# Patient Record
Sex: FEMALE | Race: BLACK OR AFRICAN AMERICAN | NOT HISPANIC OR LATINO | Employment: FULL TIME | URBAN - METROPOLITAN AREA
[De-identification: names, ages, dates, MRNs, and addresses within clinical notes are randomized per-mention and may not be internally consistent; named-entity substitution may affect disease eponyms.]

---

## 2020-03-04 ENCOUNTER — HOSPITAL ENCOUNTER (EMERGENCY)
Facility: HOSPITAL | Age: 25
Discharge: HOME | End: 2020-03-04
Attending: EMERGENCY MEDICINE
Payer: COMMERCIAL

## 2020-03-04 VITALS
TEMPERATURE: 99 F | DIASTOLIC BLOOD PRESSURE: 85 MMHG | RESPIRATION RATE: 18 BRPM | OXYGEN SATURATION: 98 % | WEIGHT: 183 LBS | SYSTOLIC BLOOD PRESSURE: 128 MMHG | HEIGHT: 65 IN | HEART RATE: 76 BPM | BODY MASS INDEX: 30.49 KG/M2

## 2020-03-04 DIAGNOSIS — M79.89 SWELLING OF ARM: Primary | ICD-10-CM

## 2020-03-04 DIAGNOSIS — M79.601 ARM PAIN, ANTERIOR, RIGHT: ICD-10-CM

## 2020-03-04 LAB
B-HCG UR QL: NEGATIVE
CTP QC/QA: YES

## 2020-03-04 PROCEDURE — 81025 URINE PREGNANCY TEST: CPT | Performed by: EMERGENCY MEDICINE

## 2020-03-04 PROCEDURE — 99284 EMERGENCY DEPT VISIT MOD MDM: CPT | Mod: 25

## 2020-03-04 RX ORDER — SULFAMETHOXAZOLE AND TRIMETHOPRIM 800; 160 MG/1; MG/1
1 TABLET ORAL 2 TIMES DAILY
Qty: 6 TABLET | Refills: 0 | Status: SHIPPED | OUTPATIENT
Start: 2020-03-04 | End: 2020-03-07

## 2020-03-04 RX ORDER — KETOROLAC TROMETHAMINE 10 MG/1
10 TABLET, FILM COATED ORAL EVERY 6 HOURS PRN
Qty: 10 TABLET | Refills: 0 | Status: SHIPPED | OUTPATIENT
Start: 2020-03-04 | End: 2020-03-07

## 2020-03-05 NOTE — ED NOTES
Patient identifiers for Trinity Peres checked and correct.  LOC: The patient is awake, alert and aware of environment with an appropriate affect, the patient is oriented x 3 and speaking appropriately.  APPEARANCE: Patient resting comfortably and in no acute distress, patient is clean and well groomed, patient's clothing are properly fastened.  SKIN: The skin is warm and dry, patient has normal skin turgor and moist mucus membranes. Slight swelling to right forearm.  MUSKULOSKELETAL: Patient moving all extremities well. Slight swelling to right forearm.  + radial pulse.  RESPIRATORY: Airway is open and patent, respirations are spontaneous, patient has a normal effort and rate.

## 2020-03-05 NOTE — ED PROVIDER NOTES
Encounter Date: 3/4/2020    SCRIBE #1 NOTE: I, Jorge Luis Jacobs, am scribing for, and in the presence of,  Dr. Hughes. I have scribed the entire note.       History     Chief Complaint   Patient presents with    Arm Pain     Complaining of right arm pain since last Monday. States she gave plasma and lower arm has been sore since then.       This is a 24 y.o. female who has no past medical history on file presents with chief complaint of right arm pain after giving plasma 9 days ago. She reports having some discomfort while giving plasma last week, and noted some swelling and pain to the proximal forearm that day. She reports the pain has spread distally down her forearm, and she now has pain with extension of her right elbow. Patient denies any fall, trauma,  worsening swelling, change in sensation. Furthermore, she denies any CP/SOB/N/V/fever/chills/abd pain/numbness/tingling/FB sensation.      The history is provided by the patient.     Review of patient's allergies indicates:  No Known Allergies  No past medical history on file.  No past surgical history on file.  No family history on file.  Social History     Tobacco Use    Smoking status: Not on file   Substance Use Topics    Alcohol use: Not on file    Drug use: Not on file     Review of Systems   Constitutional: Negative for chills and fever.   HENT: Negative for ear pain and sore throat.    Eyes: Negative for redness.   Respiratory: Negative for shortness of breath.    Cardiovascular: Negative for chest pain.   Gastrointestinal: Negative for abdominal pain, diarrhea and vomiting.   Genitourinary: Negative for dysuria.   Musculoskeletal: Negative for back pain.        Left arm pain and swelling   Skin: Negative for rash.   Neurological: Negative for headaches.     Physical Exam     Initial Vitals [03/04/20 2045]   BP Pulse Resp Temp SpO2   131/70 92 18 98.6 °F (37 °C) 97 %      MAP       --         Physical Exam    Nursing note and vitals  reviewed.  Constitutional: She appears well-developed and well-nourished. She is not diaphoretic. No distress.   Non-toxic appearing   No acute distress    HENT:   Head: Normocephalic and atraumatic.   Eyes: Conjunctivae and EOM are normal.   Neck: Normal range of motion. Neck supple.   Cardiovascular: Normal rate, regular rhythm, normal heart sounds and intact distal pulses.   Pulmonary/Chest: Breath sounds normal. No respiratory distress.   Abdominal: Soft. There is no tenderness.   Musculoskeletal: Normal range of motion. She exhibits tenderness. She exhibits no edema.        Right elbow: She exhibits normal range of motion, no swelling, no effusion and no deformity.   Soft tissue swelling to mid anterior forearm; no palpable cords, no neurovascular deficits  Pain with active extension of right elbow; no bony tenderness  Mild erythema; no red streaks; no abscess; no masses palpated  Radial pulses 2+  Sensation good, normal ROM   Neurological: She is alert and oriented to person, place, and time. She has normal strength.   Skin: Skin is warm and dry. Capillary refill takes less than 2 seconds.       ED Course   Procedures  Labs Reviewed   POCT URINE PREGNANCY          Imaging Results          US Upper Extremity Veins Right (Final result)  Result time 03/04/20 22:49:46    Final result by Adrienne Mayers MD (03/04/20 22:49:46)                 Impression:      No thrombus in central veins of the right upper extremity.      Electronically signed by: Adrienne Mayers  Date:    03/04/2020  Time:    22:49             Narrative:    EXAMINATION:  US UPPER EXTREMITY VEINS RIGHT    CLINICAL HISTORY:  arm swelling;    TECHNIQUE:  Duplex and color flow Doppler evaluation and dynamic compression was performed of the right upper extremity veins.    COMPARISON:  None    FINDINGS:  Central veins: The internal jugular, subclavian, and axillary veins are patent and free of thrombus.    Arm veins: The brachial, basilic, and  cephalic veins are patent and compressible.    Contralateral subclavian/internal jugular veins: The left subclavian and internal jugular veins are patent and free of thrombus.    Other findings: None.                                 Medical Decision Making:   Clinical Tests:   Lab Tests: Ordered and Reviewed  Radiological Study: Ordered and Reviewed  ED Management:  - UPT negative  - US RUE negative for thrombus in central veins of the right upper extremity per final radiology read  - Will treat as OP with warm compresses, NSAIDs, and short course of PO antibiotics for possible cellulitic change to RUE  - Pt denies any allergies on my questioning  - Pt VSS; NAD; afebrile  - Pt stable for discharge  - No further intervention is indicated at this time after having taken into account the patient's history, physical exam findings, and empirical and objective data obtained during the patient's emergency department workup.   - The patient is at low risk for an emergent medical condition at this time, and I am of the belief that that it is safe to discharge the patient from the emergency department.   - The patient is instructed to follow up as outpatient as indicated on the discharge paperwork.    - I have discussed the specifics of the workup with the patient and the patient has verbalized understanding of the details of the workup, the diagnosis, the treatment plan, and the need for outpatient follow-up.    - Although the patient has no emergent etiology today this does not preclude the development of an emergent condition so, in addition, I have advised the patient that they can return to the ED and/or activate EMS at any time with worsening of their symptoms, change of their symptoms, or with any other medical complaint.    - The patient remained comfortable and stable during their visit in the ED.    - Discharge and follow-up instructions discussed with the patient who expressed understanding and willingness to  comply with my recommendations.  - Results of all emergency department tests  discussed thoroughly with patient; all patient questions answered; pt in agreement with plan  - Pt instructed to follow up with PCP in 2-3 days for recheck of today's complaints  - Pt given strict emergency department return precautions for any new or worsening of symptoms  - Pt discharged from the emergency department in stable condition, in no acute distress                                   Clinical Impression:       ICD-10-CM ICD-9-CM   1. Swelling of arm M79.89 729.81   2. Arm pain, anterior, right M79.601 729.5             ED Disposition Condition    Discharge Stable        ED Prescriptions     Medication Sig Dispense Start Date End Date Auth. Provider    sulfamethoxazole-trimethoprim 800-160mg (BACTRIM DS) 800-160 mg Tab Take 1 tablet by mouth 2 (two) times daily. for 3 days 6 tablet 3/4/2020 3/7/2020 Ruben Hughes MD    ketorolac (TORADOL) 10 mg tablet Take 1 tablet (10 mg total) by mouth every 6 (six) hours as needed for Pain. 10 tablet 3/4/2020 3/7/2020 Ruben Hughes MD        Follow-up Information     Follow up With Specialties Details Why Contact Info    [facility] Emergency [facility]  If symptoms worsen [address] [facility] [address] [phone]    Primary Doctor No   Follow up with your primary care physician in the nexrt 2-3 days for recheck of today's complaints.                         I, Ruben Hughes,  personally performed the services described in this documentation. All medical record entries made by the scribe were at my direction and in my presence.  I have reviewed the chart and agree that the record reflects my personal performance and is accurate and complete. Ruben Hughes M.D. 11:02 PM03/04/2020     Ruben Hughes MD  03/04/20 8820       Ruben Hughes MD  03/04/20 5998

## 2021-09-30 ENCOUNTER — IMMUNIZATION (OUTPATIENT)
Dept: PRIMARY CARE CLINIC | Facility: CLINIC | Age: 26
End: 2021-09-30
Payer: MEDICAID

## 2021-09-30 DIAGNOSIS — Z23 NEED FOR VACCINATION: Primary | ICD-10-CM

## 2021-09-30 PROCEDURE — 0011A COVID-19, MRNA, LNP-S, PF, 100 MCG/0.5 ML DOSE VACCINE: CPT | Mod: CV19,PBBFAC | Performed by: INTERNAL MEDICINE

## 2021-10-28 ENCOUNTER — IMMUNIZATION (OUTPATIENT)
Dept: PRIMARY CARE CLINIC | Facility: CLINIC | Age: 26
End: 2021-10-28
Payer: MEDICAID

## 2021-10-28 DIAGNOSIS — Z23 NEED FOR VACCINATION: Primary | ICD-10-CM

## 2021-10-28 PROCEDURE — 0012A COVID-19, MRNA, LNP-S, PF, 100 MCG/0.5 ML DOSE VACCINE: CPT | Mod: CV19,PBBFAC | Performed by: INTERNAL MEDICINE

## 2021-12-21 ENCOUNTER — OFFICE VISIT (OUTPATIENT)
Dept: OBSTETRICS AND GYNECOLOGY | Facility: CLINIC | Age: 26
End: 2021-12-21
Payer: MEDICAID

## 2021-12-21 ENCOUNTER — LAB VISIT (OUTPATIENT)
Dept: LAB | Facility: HOSPITAL | Age: 26
End: 2021-12-21
Attending: OBSTETRICS & GYNECOLOGY
Payer: MEDICAID

## 2021-12-21 VITALS
BODY MASS INDEX: 28.76 KG/M2 | SYSTOLIC BLOOD PRESSURE: 114 MMHG | DIASTOLIC BLOOD PRESSURE: 84 MMHG | WEIGHT: 172.81 LBS

## 2021-12-21 DIAGNOSIS — N91.5 OLIGOMENORRHEA, UNSPECIFIED TYPE: ICD-10-CM

## 2021-12-21 DIAGNOSIS — Z01.419 WELL WOMAN EXAM WITH ROUTINE GYNECOLOGICAL EXAM: Primary | ICD-10-CM

## 2021-12-21 DIAGNOSIS — Z12.4 SCREENING FOR CERVICAL CANCER: ICD-10-CM

## 2021-12-21 LAB
PROLACTIN SERPL IA-MCNC: 12 NG/ML (ref 5.2–26.5)
TSH SERPL DL<=0.005 MIU/L-ACNC: 2.03 UIU/ML (ref 0.4–4)

## 2021-12-21 PROCEDURE — 84146 ASSAY OF PROLACTIN: CPT | Performed by: OBSTETRICS & GYNECOLOGY

## 2021-12-21 PROCEDURE — 99213 OFFICE O/P EST LOW 20 MIN: CPT | Mod: PBBFAC,PO | Performed by: OBSTETRICS & GYNECOLOGY

## 2021-12-21 PROCEDURE — 99385 PREV VISIT NEW AGE 18-39: CPT | Mod: S$PBB,,, | Performed by: OBSTETRICS & GYNECOLOGY

## 2021-12-21 PROCEDURE — 99999 PR PBB SHADOW E&M-EST. PATIENT-LVL III: CPT | Mod: PBBFAC,,, | Performed by: OBSTETRICS & GYNECOLOGY

## 2021-12-21 PROCEDURE — 36415 COLL VENOUS BLD VENIPUNCTURE: CPT | Performed by: OBSTETRICS & GYNECOLOGY

## 2021-12-21 PROCEDURE — 99999 PR PBB SHADOW E&M-EST. PATIENT-LVL III: ICD-10-PCS | Mod: PBBFAC,,, | Performed by: OBSTETRICS & GYNECOLOGY

## 2021-12-21 PROCEDURE — 84443 ASSAY THYROID STIM HORMONE: CPT | Performed by: OBSTETRICS & GYNECOLOGY

## 2021-12-21 PROCEDURE — 99385 PR PREVENTIVE VISIT,NEW,18-39: ICD-10-PCS | Mod: S$PBB,,, | Performed by: OBSTETRICS & GYNECOLOGY

## 2021-12-21 PROCEDURE — 88175 CYTOPATH C/V AUTO FLUID REDO: CPT | Performed by: OBSTETRICS & GYNECOLOGY

## 2021-12-29 ENCOUNTER — TELEPHONE (OUTPATIENT)
Dept: OBSTETRICS AND GYNECOLOGY | Facility: CLINIC | Age: 26
End: 2021-12-29
Payer: MEDICAID

## 2021-12-30 LAB
FINAL PATHOLOGIC DIAGNOSIS: NORMAL
Lab: NORMAL

## 2021-12-30 RX ORDER — METRONIDAZOLE 500 MG/1
500 TABLET ORAL EVERY 12 HOURS
Qty: 14 TABLET | Refills: 0 | Status: SHIPPED | OUTPATIENT
Start: 2021-12-30 | End: 2022-01-06

## 2022-05-21 ENCOUNTER — HOSPITAL ENCOUNTER (EMERGENCY)
Facility: HOSPITAL | Age: 27
Discharge: HOME | End: 2022-05-22
Attending: EMERGENCY MEDICINE
Payer: MEDICAID

## 2022-05-21 DIAGNOSIS — S89.90XA KNEE INJURY: Primary | ICD-10-CM

## 2022-05-21 PROCEDURE — 99284 EMERGENCY DEPT VISIT MOD MDM: CPT | Mod: ,,, | Performed by: EMERGENCY MEDICINE

## 2022-05-21 PROCEDURE — 99283 EMERGENCY DEPT VISIT LOW MDM: CPT

## 2022-05-21 PROCEDURE — 99284 PR EMERGENCY DEPT VISIT,LEVEL IV: ICD-10-PCS | Mod: ,,, | Performed by: EMERGENCY MEDICINE

## 2022-05-21 PROCEDURE — 25000003 PHARM REV CODE 250: Performed by: PHYSICIAN ASSISTANT

## 2022-05-21 RX ORDER — IBUPROFEN 600 MG/1
600 TABLET ORAL
Status: COMPLETED | OUTPATIENT
Start: 2022-05-21 | End: 2022-05-21

## 2022-05-21 RX ADMIN — IBUPROFEN 600 MG: 600 TABLET, FILM COATED ORAL at 11:05

## 2022-05-22 VITALS
HEART RATE: 85 BPM | SYSTOLIC BLOOD PRESSURE: 121 MMHG | RESPIRATION RATE: 16 BRPM | OXYGEN SATURATION: 98 % | DIASTOLIC BLOOD PRESSURE: 74 MMHG | TEMPERATURE: 99 F

## 2022-05-22 RX ORDER — IBUPROFEN 600 MG/1
600 TABLET ORAL EVERY 6 HOURS PRN
Qty: 20 TABLET | Refills: 0 | Status: ON HOLD | OUTPATIENT
Start: 2022-05-22 | End: 2023-06-12

## 2022-05-22 NOTE — ED PROVIDER NOTES
Encounter Date: 5/21/2022       History     Chief Complaint   Patient presents with    Knee Injury     Slipped & injured knee. Pt ambulatory. Denies hitting head/blood thinners     This is a 26 y.o. year old female with no known significant PMH who presents to the ED with a chief complaint of knee injury. Patient reports she was at wedding tonight when she slipped on something and caught herself from falling. She heard and felt a loud pop. She's been unable to bear weight to the leg due to knee pain. Patient rates the pain 10/10. Attempted treatment includes none yet. She denies fever, chills, nausea, vomiting, focal weakness or numbness.           Review of patient's allergies indicates:  No Known Allergies  History reviewed. No pertinent past medical history.  History reviewed. No pertinent surgical history.  History reviewed. No pertinent family history.  Social History     Tobacco Use    Smoking status: Never Smoker    Smokeless tobacco: Never Used   Substance Use Topics    Alcohol use: Never    Drug use: Never     Review of Systems   Musculoskeletal: Positive for arthralgias and joint swelling.   Skin: Negative for wound.   Allergic/Immunologic: Negative for immunocompromised state.   Neurological: Negative for weakness and numbness.   Hematological: Does not bruise/bleed easily.       Physical Exam     Initial Vitals [05/21/22 2220]   BP Pulse Resp Temp SpO2   134/84 (!) 116 16 98.6 °F (37 °C) 99 %      MAP       --         Physical Exam    Constitutional: She appears well-developed and well-nourished.   HENT:   Head: Atraumatic.   Eyes: Conjunctivae and EOM are normal. Pupils are equal, round, and reactive to light.   Musculoskeletal:      Left knee: No bony tenderness. Normal range of motion. No tenderness. Normal alignment.      Instability Tests: Anterior drawer test negative. Posterior drawer test negative. Medial Asael test negative and lateral Asael test negative.     Neurological: She is  alert and oriented to person, place, and time.   Skin: Skin is warm and dry. No rash noted.         ED Course   Procedures  Labs Reviewed - No data to display       Imaging Results          X-Ray Knee 3 View Left (Final result)  Result time 05/22/22 00:24:27    Final result by Bao Babin MD (05/22/22 00:24:27)                 Impression:      There is no evidence for acute fracture or dislocation.    Soft tissue findings for which correlation for ligamentous injury is needed.  There is no radiographically detectable radiopaque foreign body.    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      Electronically signed by: Bao Babin  Date:    05/22/2022  Time:    00:24             Narrative:    EXAMINATION:  XR KNEE 3 VIEW LEFT    CLINICAL HISTORY:  Unspecified injury of unspecified lower leg, initial encounter    TECHNIQUE:  AP, lateral, and Merchant views of the left knee were performed.    COMPARISON:  None    FINDINGS:  Along the distal shaft of the femur there are 2 separate areas of well-defined eccentric lesions, these lesions do not appear aggressive, there is no evidence for adjacent periosteal reaction, and the appearance is thought likely representative of osteophyte or partially ossified nonossifying fibromas.  There is no prior study available for comparison, follow-up to document stability is recommended.    The osseous structures otherwise appear intact, there is no evidence for acute fracture or dislocation, there is appearance of may relate to a small suprapatellar joint effusion.  In addition there is slight irregularity of the soft tissues suggested medially and laterally, correlation for ligamentous injury is needed.  There is no radiographically detectable radiopaque foreign body.       "                           Medications   ibuprofen tablet 600 mg (600 mg Oral Given 5/21/22 2451)     Medical Decision Making:   Clinical Tests:   Radiological Study: Ordered and Reviewed       APC / Resident Notes:   26 y.o. year old female presenting with left knee injury.    DDx includes but is not limited to knee sprain, fracture, dislocation.    Xray left knee with " no evidence for acute fracture or dislocation, there is appearance of may relate to a small suprapatellar joint effusion.  In addition there is slight irregularity of the soft tissues suggested medially and laterally, correlation for ligamentous injury is needed."    Plan  Knee immobilizer, crutches  Toe touch weight bearing  Ortho follow up, referral placed  NSAIDs    Discussed findings and plan with patient who verbalized understanding and agrees with the plan and course of treatment. Return to ED precautions discussed. Patient is stable for discharge.                  Clinical Impression:   Final diagnoses:  [S89.90XA] Knee injury (Primary)          ED Disposition Condition    Discharge Stable        ED Prescriptions     Medication Sig Dispense Start Date End Date Auth. Provider    ibuprofen (ADVIL,MOTRIN) 600 MG tablet Take 1 tablet (600 mg total) by mouth every 6 (six) hours as needed for Pain. 20 tablet 5/22/2022  Ashtyn Dye PA-C        Follow-up Information     Follow up With Specialties Details Why Contact [facility] Surgical Oncology, Orthopedic Surgery, Genetics, Physical Medicine and Rehabilitation, Occupational Therapy, Radiology   [address] [address]  [phone]             Ashtyn Dye PA-C  05/22/22 0047    "

## 2022-05-22 NOTE — ED TRIAGE NOTES
Trinity Peres, a 26 y.o. female presents to the ED w/ complaint of knee pain. Slip and fall     Triage note:  Chief Complaint   Patient presents with    Knee Injury     Slipped & injured knee. Pt ambulatory. Denies hitting head/blood thinners     Review of patient's allergies indicates:  No Known Allergies  No past medical history on file.

## 2023-03-05 ENCOUNTER — HOSPITAL ENCOUNTER (EMERGENCY)
Facility: HOSPITAL | Age: 28
Discharge: HOME | End: 2023-03-06
Attending: EMERGENCY MEDICINE
Payer: MEDICAID

## 2023-03-05 VITALS
BODY MASS INDEX: 29.5 KG/M2 | WEIGHT: 180 LBS | OXYGEN SATURATION: 98 % | RESPIRATION RATE: 15 BRPM | DIASTOLIC BLOOD PRESSURE: 76 MMHG | TEMPERATURE: 98 F | SYSTOLIC BLOOD PRESSURE: 114 MMHG | HEART RATE: 87 BPM

## 2023-03-05 DIAGNOSIS — M23.92 INTERNAL DERANGEMENT OF LEFT KNEE: Primary | ICD-10-CM

## 2023-03-05 DIAGNOSIS — S93.402A SPRAIN OF LEFT ANKLE, UNSPECIFIED LIGAMENT, INITIAL ENCOUNTER: ICD-10-CM

## 2023-03-05 DIAGNOSIS — M79.606 LEG PAIN: ICD-10-CM

## 2023-03-05 LAB
B-HCG UR QL: NEGATIVE
CTP QC/QA: YES

## 2023-03-05 PROCEDURE — 99284 EMERGENCY DEPT VISIT MOD MDM: CPT

## 2023-03-05 PROCEDURE — 96372 THER/PROPH/DIAG INJ SC/IM: CPT | Mod: 59 | Performed by: PHYSICIAN ASSISTANT

## 2023-03-05 PROCEDURE — 99284 EMERGENCY DEPT VISIT MOD MDM: CPT | Mod: ,,, | Performed by: PHYSICIAN ASSISTANT

## 2023-03-05 PROCEDURE — 29515 APPLICATION SHORT LEG SPLINT: CPT | Mod: LT

## 2023-03-05 PROCEDURE — 81025 URINE PREGNANCY TEST: CPT | Performed by: PHYSICIAN ASSISTANT

## 2023-03-05 PROCEDURE — 63600175 PHARM REV CODE 636 W HCPCS: Performed by: PHYSICIAN ASSISTANT

## 2023-03-05 PROCEDURE — 99284 PR EMERGENCY DEPT VISIT,LEVEL IV: ICD-10-PCS | Mod: ,,, | Performed by: PHYSICIAN ASSISTANT

## 2023-03-05 RX ORDER — IBUPROFEN 600 MG/1
600 TABLET ORAL EVERY 6 HOURS PRN
Qty: 20 TABLET | Refills: 0 | Status: SHIPPED | OUTPATIENT
Start: 2023-03-05 | End: 2023-03-15

## 2023-03-05 RX ORDER — KETOROLAC TROMETHAMINE 30 MG/ML
15 INJECTION, SOLUTION INTRAMUSCULAR; INTRAVENOUS
Status: COMPLETED | OUTPATIENT
Start: 2023-03-05 | End: 2023-03-05

## 2023-03-05 RX ADMIN — KETOROLAC TROMETHAMINE 15 MG: 30 INJECTION, SOLUTION INTRAMUSCULAR; INTRAVENOUS at 10:03

## 2023-03-06 NOTE — ED TRIAGE NOTES
Trinity Larisa, a 27 y.o. female presents to the ED w/ complaint of  L leg pain after falling off a truck around 0945. Pt denies hitting head or LOC.   Triage note:  Chief Complaint   Patient presents with    Leg Pain     Left leg pain s/p falling off truck while at drill earlier today. Denies head trauma. No obvious trauma or deformity present. Pt ambulatory in triage     Review of patient's allergies indicates:  No Known Allergies  No past medical history on file.

## 2023-03-06 NOTE — ED PROVIDER NOTES
Encounter Date: 3/5/2023       History     Chief Complaint   Patient presents with    Leg Pain     Left leg pain s/p falling off truck while at drill earlier today. Denies head trauma. No obvious trauma or deformity present. Pt ambulatory in triage     27-year-old female presents emergency department for left leg pain.  States she was getting out of a  truck and as she stepped down onto the gas tank heard a pop and fell to the ground.  Denies any head trauma, LOC, nausea vomiting blurred vision or dizziness.  States she is been ambulatory with a limp but has pain to her left knee, shin and ankle.      Review of patient's allergies indicates:  No Known Allergies  History reviewed. No pertinent past medical history.  History reviewed. No pertinent surgical history.  History reviewed. No pertinent family history.  Social History     Tobacco Use    Smoking status: Never    Smokeless tobacco: Never   Substance Use Topics    Alcohol use: Never    Drug use: Never     Review of Systems    Physical Exam     Initial Vitals [03/05/23 1928]   BP Pulse Resp Temp SpO2   139/86 79 16 98.1 °F (36.7 °C) 100 %      MAP       --         Physical Exam    Nursing note and vitals reviewed.  Constitutional: She appears well-developed and well-nourished.   HENT:   Head: Normocephalic and atraumatic.   Eyes: Conjunctivae are normal. Pupils are equal, round, and reactive to light.   Neck: Neck supple.   No midline cervical tenderness or step-offs   Normal range of motion.  Cardiovascular:  Normal rate, regular rhythm, normal heart sounds and intact distal pulses.           Pulmonary/Chest: Breath sounds normal.   Abdominal: Abdomen is soft. Bowel sounds are normal. There is no abdominal tenderness.   Musculoskeletal:         General: Tenderness present.      Cervical back: Normal range of motion and neck supple.      Comments: Generalized tenderness to the left knee as well as the proximal anterior shin.  No laxity on Lachman's exam.   Able to flex and extend however flexion slightly limited secondary to pain.  Lower extremities neurovascularly intact with no open wounds or abrasions.  Mild tenderness to the distal Achilles however negative Stockton test.     Neurological: She is alert and oriented to person, place, and time. She has normal strength. No cranial nerve deficit. GCS score is 15. GCS eye subscore is 4. GCS verbal subscore is 5. GCS motor subscore is 6.   Skin: Skin is warm and dry. Capillary refill takes less than 2 seconds.   Psychiatric: She has a normal mood and affect.       ED Course   Procedures  Labs Reviewed   POCT URINE PREGNANCY          Imaging Results              X-Ray Ankle Complete Left (Final result)  Result time 03/05/23 23:11:19      Final result by Ryan Estrada MD (03/05/23 23:11:19)                   Impression:      Ankle soft tissue edema.  No acute bony abnormality.      Electronically signed by: Ryan Estrada MD  Date:    03/05/2023  Time:    23:11               Narrative:    EXAMINATION:  XR ANKLE COMPLETE 3 VIEW LEFT    CLINICAL HISTORY:  Pain in leg, unspecified.    TECHNIQUE:  AP, lateral and oblique views of the left ankle were performed.    COMPARISON:  None.    FINDINGS:  No acute fracture or dislocation.  Mild soft tissue edema, slightly more pronounced over the medial aspect of the ankle.  No unexpected radiopaque foreign body.                                       X-Ray Tibia Fibula 2 View Left (Final result)  Result time 03/05/23 23:09:59      Final result by Ryan Estrada MD (03/05/23 23:09:59)                   Impression:      No acute displaced fracture.      Electronically signed by: Ryan Estrada MD  Date:    03/05/2023  Time:    23:09               Narrative:    EXAMINATION:  XR TIBIA FIBULA 2 VIEW LEFT    CLINICAL HISTORY:  Pain in left leg    TECHNIQUE:  AP and lateral views of the left tibia and fibula were performed.    COMPARISON:  None.    FINDINGS:  No acute fracture or  dislocation.  Soft tissues are unremarkable.                                       X-Ray Knee 3 View Left (Final result)  Result time 03/05/23 23:08:38      Final result by Ryan Estrada MD (03/05/23 23:08:38)                   Impression:      No acute bony abnormality.      Electronically signed by: Ryan Estrada MD  Date:    03/05/2023  Time:    23:08               Narrative:    EXAMINATION:  XR KNEE 3 VIEW LEFT    CLINICAL HISTORY:  Pain in unspecified knee    TECHNIQUE:  AP, lateral, and Merchant views of the left knee were performed.    COMPARISON:  05/21/2022.    FINDINGS:  No acute fracture or dislocation.  Stable benign-appearing sclerotic foci in the distal femoral shaft.  Soft tissues are unremarkable.  No unexpected radiopaque foreign body.                                       Medications   ketorolac injection 15 mg (15 mg Intramuscular Given 3/5/23 2216)     Medical Decision Making:   History:   Old Medical Records: I decided to obtain old medical records.  Initial Assessment:   27-year-old female presents ED left leg pain after jumping out of a truck.  States she heard a pop she was getting down.  Differential Diagnosis:   Internal derangement of the left knee.  Achilles tendon rupture, ankle sprain, knee sprain, tib-fib fracture  Clinical Tests:   Lab Tests: Ordered and Reviewed  Radiological Study: Ordered and Reviewed  ED Management:  X-rays of the knee tib-fib and ankle show no acute fractures or dislocation.  There is mild soft tissue swelling in the ankle.  Doubt tendon rupture given her ability to dorsiflex and plantar flex.  Lower extremities are neurovascularly intact.  Potential internal derangement will place in a knee immobilizer with crutches.  Will discharge with NSAIDs, instructions for rest, ice elevation and follow-up with orthopedics as needed.  Discussed return ED precautions for any new worsening symptoms.                        Clinical Impression:   Final  diagnoses:  [M79.606] Leg pain  [M23.92] Internal derangement of left knee (Primary)  [S93.402A] Sprain of left ankle, unspecified ligament, initial encounter        ED Disposition Condition    Discharge Stable          ED Prescriptions       Medication Sig Dispense Start Date End Date Auth. Provider    ibuprofen (ADVIL,MOTRIN) 600 MG tablet Take 1 tablet (600 mg total) by mouth every 6 (six) hours as needed for Pain. 20 tablet 3/5/2023 3/15/2023 Harriet Chance PA-C          Follow-up Information       Follow up With Specialties Details Why Contact Info Additional Information    [facility] Schedule an appointment as soon as possible for a visit   [address]  [facility] [address] [phone] [facility] - Main Building, 5th Floor Please park in [facility] and take Atrium elevator             Harriet Chance PA-C  03/06/23 0146

## 2023-03-07 ENCOUNTER — TELEPHONE (OUTPATIENT)
Dept: ORTHOPEDICS | Facility: CLINIC | Age: 28
End: 2023-03-07
Payer: MEDICAID

## 2023-03-07 NOTE — TELEPHONE ENCOUNTER
----- Message from Js Sheppard sent at 3/7/2023 12:39 PM CST -----  Regarding: ED REFERRAL - MEDICAID - [place] -   Contact: self  Pt was seen at [facility] ED on Sun 03/05/2023 for the following diagnosis due to a fall at drill:    M79.606 (ICD-10-CM) - Leg pain  M23.92 (ICD-10-CM) - Internal derangement of left knee    Referral is in the system Pt ask for a call to schedule      Contact info  [phone] (home)      We spoke     Appt booked for 3/29/ at [place] [facility]

## 2023-03-29 ENCOUNTER — OFFICE VISIT (OUTPATIENT)
Dept: ORTHOPEDICS | Facility: CLINIC | Age: 28
End: 2023-03-29
Payer: MEDICAID

## 2023-03-29 VITALS
WEIGHT: 193 LBS | HEIGHT: 66 IN | DIASTOLIC BLOOD PRESSURE: 71 MMHG | BODY MASS INDEX: 31.02 KG/M2 | HEART RATE: 72 BPM | SYSTOLIC BLOOD PRESSURE: 105 MMHG

## 2023-03-29 DIAGNOSIS — M25.562 ACUTE PAIN OF LEFT KNEE: Primary | ICD-10-CM

## 2023-03-29 PROCEDURE — 99213 OFFICE O/P EST LOW 20 MIN: CPT | Mod: PBBFAC,PN

## 2023-03-29 PROCEDURE — 3074F PR MOST RECENT SYSTOLIC BLOOD PRESSURE < 130 MM HG: ICD-10-PCS | Mod: CPTII,,,

## 2023-03-29 PROCEDURE — 99999 PR PBB SHADOW E&M-EST. PATIENT-LVL III: CPT | Mod: PBBFAC,,,

## 2023-03-29 PROCEDURE — 3008F PR BODY MASS INDEX (BMI) DOCUMENTED: ICD-10-PCS | Mod: CPTII,,,

## 2023-03-29 PROCEDURE — 99204 PR OFFICE/OUTPT VISIT, NEW, LEVL IV, 45-59 MIN: ICD-10-PCS | Mod: S$PBB,,,

## 2023-03-29 PROCEDURE — 3074F SYST BP LT 130 MM HG: CPT | Mod: CPTII,,,

## 2023-03-29 PROCEDURE — 3008F BODY MASS INDEX DOCD: CPT | Mod: CPTII,,,

## 2023-03-29 PROCEDURE — 1159F PR MEDICATION LIST DOCUMENTED IN MEDICAL RECORD: ICD-10-PCS | Mod: CPTII,,,

## 2023-03-29 PROCEDURE — 99204 OFFICE O/P NEW MOD 45 MIN: CPT | Mod: S$PBB,,,

## 2023-03-29 PROCEDURE — 99999 PR PBB SHADOW E&M-EST. PATIENT-LVL III: ICD-10-PCS | Mod: PBBFAC,,,

## 2023-03-29 PROCEDURE — 1159F MED LIST DOCD IN RCRD: CPT | Mod: CPTII,,,

## 2023-03-29 PROCEDURE — 3078F PR MOST RECENT DIASTOLIC BLOOD PRESSURE < 80 MM HG: ICD-10-PCS | Mod: CPTII,,,

## 2023-03-29 PROCEDURE — 3078F DIAST BP <80 MM HG: CPT | Mod: CPTII,,,

## 2023-03-29 RX ORDER — OLOPATADINE HYDROCHLORIDE 1 MG/ML
1 SOLUTION/ DROPS OPHTHALMIC 2 TIMES DAILY
COMMUNITY
Start: 2023-01-11

## 2023-03-29 RX ORDER — NEOMYCIN SULFATE, POLYMYXIN B SULFATE AND DEXAMETHASONE 3.5; 10000; 1 MG/ML; [USP'U]/ML; MG/ML
1 SUSPENSION/ DROPS OPHTHALMIC 4 TIMES DAILY
COMMUNITY
Start: 2023-01-11

## 2023-03-29 NOTE — PROGRESS NOTES
"Patient ID: Trinity Peres is a 27 y.o. female    Pain and Injury of the Left Knee, Pain and Injury of the Left Ankle, and Pain and Injury of the Left Lower Leg      History of Present Illness:    Trinity Peres presents to clinic for left knee and ankle pain. Patient reports injury, states on 3/5/2023 she was getting out of a truck, felt her knee buckle and ankle "pop". She went to ER, acute fracture or dislocation ruled out.  Patient reports that she initially was unable to flex her knee, she is now able to fully extend and flex her knee with mild pain.  She does report an overall improvement in her symptoms but her knee pain is still bothersome and she does note instability. The pain started 3 weeks ago.  Pain is located over (points to) posterior left knee. She reports that the pain is a 4 /10 sharp pain toda. The pain is affecting ADLs and limiting desired level of activity. Denies numbness, tingling, radiation and inability to bear weight.    Trial of ibuprofen with some improvement.  She also feels like her Achilles is pulling.  Notes pain on the lateral aspect of her left ankle as well.    Mechanical symptoms: +  Instability: +    Occupation: unemployed     Ambulating: unassisted  Diabetic: no  Smoking: no  Hx of DVT/PE: no     PAST MEDICAL HISTORY: No past medical history on file.  PAST SURGICAL HISTORY: No past surgical history on file.  FAMILY HISTORY: No family history on file.  SOCIAL HISTORY:   Social History     Occupational History    Not on file   Tobacco Use    Smoking status: Never    Smokeless tobacco: Never   Substance and Sexual Activity    Alcohol use: Never    Drug use: Never    Sexual activity: Yes     Partners: Female        MEDICATIONS:   Current Outpatient Medications:     ibuprofen (ADVIL,MOTRIN) 600 MG tablet, Take 1 tablet (600 mg total) by mouth every 6 (six) hours as needed for Pain., Disp: 20 tablet, Rfl: 0    neomycin-polymyxin-dexamethasone (MAXITROL) 3.5mg/mL-10,000 " unit/mL-0.1 % DrpS, Place 1 drop into the left eye 4 (four) times daily., Disp: , Rfl:     olopatadine (PATANOL) 0.1 % ophthalmic solution, Place 1 drop into both eyes 2 (two) times daily., Disp: , Rfl:     fluticasone propionate (FLOVENT HFA) 44 mcg/actuation inhaler, Inhale 1 puff into the lungs 2 (two) times daily. Controller, Disp: 10.6 g, Rfl: 0  ALLERGIES: Review of patient's allergies indicates:  No Known Allergies      Physical Exam     Vitals:    03/29/23 1332   BP: 105/71   Pulse: 72     Alert and oriented to person, place and time. No acute distress. Well-groomed, not ill appearing. Pupils round and reactive, normal respiratory effort, no audible wheezing.     OBSERVATION / INSPECTION   Gait:   Nonantalgic   Alignment:  Neutral   Scars:   None   Muscle atrophy: None  Effusion:  None   Warmth:  None   Discoloration:   None     TENDERNESS                 Patella     Negative    Peripatellar medial    Negative   Peripatellar lateral   +   Patellar tendon   Negative   Quad tendon     Negative    Prepatellar Bursa   Negative     Tibial tubercle    Negative    Pes anserine/HS   Negative      ITB     Negative      LCL     Negative  MFC     Negative  LFC     Negative  MCL      +  Medial Joint Line    +   Lateral Joint Line   Negative  Quadriceps    Negative  Hamstring     Negative            Range of  Motion:        Left knee:   0-120° *  Right knee:    0-140°      Patellofemoral examination:     Patella position    Centered  Crepitus (PF):    Absent   Lateral tilt:    Normal   J-sign:     None   Patellofemoral grind:   No pain       MENISCAL SIGNS:     Pain on terminal extension:  +  Pain on terminal flexion:  Negative  Asaels maneuver:  +    LIGAMENT EXAMINATION:  ACL / Lachman:  normal   PCL-Post.  drawer: normal 0 to 2mm  MCL- Valgus:  normal 0 to 2mm  LCL- Varus:    normal 0 to 2mm    Dial Test: difference c/w other side  At 30° flexion: normal (< 5°)   At 90° flexion: normal (< 5°)       STRENGTH: (*  = with pain)   Quadricep   5/5  Hamstrin/5    EXTREMITY NEURO-VASCULAR EXAMINATION:   Sensation:  Grossly intact to light touch all dermatomal regions.   Motor Function:  Fully intact motor function at hip, knee, foot and ankle    DTRs;  quadriceps and  achilles 2+.  No clonus and downgoing Babinski.    Vascular status:  DP and PT pulses 2+, brisk capillary refill, symmetric.     Imaging:     Left knee, tib fib, and ankle X-rays ordered/reviewed by me showing no evidence of fracture or dislocation. There is no obvious malalignment. No evidence of masses, lesions or foreign bodies.     Assessment & Plan    Acute pain of left knee  -     MRI Knee Without Contrast Left; Future; Expected date: 2023         I made the decision to obtain old records of the patient including previous notes and imaging. New imaging was ordered today of the extremity or extremities evaluated. I independently reviewed and interpreted the radiographs and/or MRIs/CT scan today as well as prior imaging.    We discussed at length different treatment options including conservative vs surgical management. These include anti-inflammatories, acetaminophen, rest, ice, heat, formal physical therapy including strengthening and stretching exercises, home exercise programs, injections, dry needling, and finally surgical intervention.      We will proceed today with MRI to evaluate meniscal pathology.  She is endorsing mechanical symptoms as well as positive Asael's on exam.  He was instructed to continue ibuprofen, rice treatment.  She will follow up virtually for MRI results and possible surgical consultation if indicated.    MRI left knee to evaluate meniscal pathology  Ice compress to the affected area 2-3x a day for 15-20 minutes as needed for pain management  Cont ROM and activity as tolerated    Follow up: virtually MRI results  X-rays next visit: none    All questions were answered and patient is agreeable to the above plan.

## 2023-04-08 ENCOUNTER — HOSPITAL ENCOUNTER (OUTPATIENT)
Dept: RADIOLOGY | Facility: OTHER | Age: 28
Discharge: HOME | End: 2023-04-08
Payer: MEDICAID

## 2023-04-08 DIAGNOSIS — M25.562 ACUTE PAIN OF LEFT KNEE: ICD-10-CM

## 2023-04-08 PROCEDURE — 73721 MRI JNT OF LWR EXTRE W/O DYE: CPT | Mod: TC,LT

## 2023-04-08 PROCEDURE — 73721 MRI KNEE WITHOUT CONTRAST LEFT: ICD-10-PCS | Mod: 26,LT,, | Performed by: RADIOLOGY

## 2023-04-08 PROCEDURE — 73721 MRI JNT OF LWR EXTRE W/O DYE: CPT | Mod: 26,LT,, | Performed by: RADIOLOGY

## 2023-04-11 ENCOUNTER — TELEPHONE (OUTPATIENT)
Dept: ORTHOPEDICS | Facility: CLINIC | Age: 28
End: 2023-04-11
Payer: MEDICAID

## 2023-04-11 NOTE — TELEPHONE ENCOUNTER
----- Message from Ernie Prado sent at 4/11/2023  1:15 PM CDT -----  Regarding: appt / virtual on 5/4/23; sooner appt  Contact: PT @ [phone]  Pt is calling to speak to someone in the office; asking for a sooner appt than what they are scheduled for. Pt is already on the wait list; no available appts in Epic that are coming up soon. Pt is asking to speak to someone in the office. Please call to advise. Thanks.    Reason for sooner appt.: 5/4/23 is too late pt feels to have results of MRI / MRI results Lt. Knee.    When is the first available appointment? 5/4/23    Communication Preference: PT @ [phone]    Additional Information: Please call. Thanks.

## 2023-04-11 NOTE — TELEPHONE ENCOUNTER
Spoke with pt. Pt states she would like to be seen sooner for MRI results. Appt r/s per DAYANARA Dillard due to having a complete ACL tear. All questions answered. Pt verbalized understanding.

## 2023-04-12 ENCOUNTER — OFFICE VISIT (OUTPATIENT)
Dept: PRIMARY CARE CLINIC | Facility: CLINIC | Age: 28
End: 2023-04-12
Payer: MEDICAID

## 2023-04-12 VITALS
HEART RATE: 83 BPM | WEIGHT: 193.81 LBS | DIASTOLIC BLOOD PRESSURE: 79 MMHG | HEIGHT: 66 IN | OXYGEN SATURATION: 98 % | SYSTOLIC BLOOD PRESSURE: 126 MMHG | BODY MASS INDEX: 31.15 KG/M2

## 2023-04-12 DIAGNOSIS — F41.9 ANXIETY AND DEPRESSION: Primary | ICD-10-CM

## 2023-04-12 DIAGNOSIS — F32.A ANXIETY AND DEPRESSION: Primary | ICD-10-CM

## 2023-04-12 PROCEDURE — 99203 PR OFFICE/OUTPT VISIT, NEW, LEVL III, 30-44 MIN: ICD-10-PCS | Mod: S$PBB,,, | Performed by: STUDENT IN AN ORGANIZED HEALTH CARE EDUCATION/TRAINING PROGRAM

## 2023-04-12 PROCEDURE — 3078F PR MOST RECENT DIASTOLIC BLOOD PRESSURE < 80 MM HG: ICD-10-PCS | Mod: CPTII,,, | Performed by: STUDENT IN AN ORGANIZED HEALTH CARE EDUCATION/TRAINING PROGRAM

## 2023-04-12 PROCEDURE — 99999 PR PBB SHADOW E&M-EST. PATIENT-LVL III: CPT | Mod: PBBFAC,,, | Performed by: STUDENT IN AN ORGANIZED HEALTH CARE EDUCATION/TRAINING PROGRAM

## 2023-04-12 PROCEDURE — 3074F SYST BP LT 130 MM HG: CPT | Mod: CPTII,,, | Performed by: STUDENT IN AN ORGANIZED HEALTH CARE EDUCATION/TRAINING PROGRAM

## 2023-04-12 PROCEDURE — 3078F DIAST BP <80 MM HG: CPT | Mod: CPTII,,, | Performed by: STUDENT IN AN ORGANIZED HEALTH CARE EDUCATION/TRAINING PROGRAM

## 2023-04-12 PROCEDURE — 99213 OFFICE O/P EST LOW 20 MIN: CPT | Mod: PBBFAC,PN | Performed by: STUDENT IN AN ORGANIZED HEALTH CARE EDUCATION/TRAINING PROGRAM

## 2023-04-12 PROCEDURE — 99999 PR PBB SHADOW E&M-EST. PATIENT-LVL III: ICD-10-PCS | Mod: PBBFAC,,, | Performed by: STUDENT IN AN ORGANIZED HEALTH CARE EDUCATION/TRAINING PROGRAM

## 2023-04-12 PROCEDURE — 3008F PR BODY MASS INDEX (BMI) DOCUMENTED: ICD-10-PCS | Mod: CPTII,,, | Performed by: STUDENT IN AN ORGANIZED HEALTH CARE EDUCATION/TRAINING PROGRAM

## 2023-04-12 PROCEDURE — 3008F BODY MASS INDEX DOCD: CPT | Mod: CPTII,,, | Performed by: STUDENT IN AN ORGANIZED HEALTH CARE EDUCATION/TRAINING PROGRAM

## 2023-04-12 PROCEDURE — 99203 OFFICE O/P NEW LOW 30 MIN: CPT | Mod: S$PBB,,, | Performed by: STUDENT IN AN ORGANIZED HEALTH CARE EDUCATION/TRAINING PROGRAM

## 2023-04-12 PROCEDURE — 3074F PR MOST RECENT SYSTOLIC BLOOD PRESSURE < 130 MM HG: ICD-10-PCS | Mod: CPTII,,, | Performed by: STUDENT IN AN ORGANIZED HEALTH CARE EDUCATION/TRAINING PROGRAM

## 2023-04-12 RX ORDER — FLUOXETINE 10 MG/1
10 CAPSULE ORAL DAILY
Qty: 30 CAPSULE | Refills: 1 | Status: SHIPPED | OUTPATIENT
Start: 2023-04-12 | End: 2023-05-10 | Stop reason: DRUGHIGH

## 2023-04-12 NOTE — PROGRESS NOTES
04/12/2023    Trinity Peres  [id number]    Chief Complaint   Patient presents with    Anxiety       HPI    This pt is new to me and presents to [email or web address] care. She is in the  and was advised to start anxiety medications.     Can go days without eating. Endorses sleep disturbance nightly; sometimes waking up every hour. Endorses crying spells. Endorses agitation. Would prefer to be alone and not be bothered with people. Does still enjoy reading and going shopping, spending time with family. Denies hx of therapy or previous medication. Exercise: was going to the gym but right now knee is hurting. Diet: is more of a grazer. Denies SI or HI. Has just started therapy.       Negative 10 point ROS outside of HPI    Social History     Socioeconomic History    Marital status: Single   Tobacco Use    Smoking status: Never    Smokeless tobacco: Never   Substance and Sexual Activity    Alcohol use: Never    Drug use: Never    Sexual activity: Yes     Partners: Female       Current Outpatient Medications:     fluticasone propionate (FLOVENT HFA) 44 mcg/actuation inhaler, Inhale 1 puff into the lungs 2 (two) times daily. Controller, Disp: 10.6 g, Rfl: 0    ibuprofen (ADVIL,MOTRIN) 600 MG tablet, Take 1 tablet (600 mg total) by mouth every 6 (six) hours as needed for Pain., Disp: 20 tablet, Rfl: 0    neomycin-polymyxin-dexamethasone (MAXITROL) 3.5mg/mL-10,000 unit/mL-0.1 % DrpS, Place 1 drop into the left eye 4 (four) times daily., Disp: , Rfl:     olopatadine (PATANOL) 0.1 % ophthalmic solution, Place 1 drop into both eyes 2 (two) times daily., Disp: , Rfl:       Physical Exam  Vitals:    04/12/23 1412   BP: 126/79   Pulse: 83       Gen: well appearing, NAD  Resp: non labored breathing, no crackles, no wheezes, CTAB  CV: RRR no murmur, gallops, rubs, no LE edema  Abd: soft nontender BS present no organomegaly    1. Anxiety and depression  Uncontrolled  start  - FLUoxetine 10 MG capsule; Take 1 capsule (10 mg total) by mouth  once daily.  Dispense: 30 capsule; Refill: 1    RTC in 4 weeks virtual visit     Ana Maria Peter MD  Family Medicine

## 2023-04-18 ENCOUNTER — TELEPHONE (OUTPATIENT)
Dept: ORTHOPEDICS | Facility: CLINIC | Age: 28
End: 2023-04-18

## 2023-04-18 ENCOUNTER — OFFICE VISIT (OUTPATIENT)
Dept: ORTHOPEDICS | Facility: CLINIC | Age: 28
End: 2023-04-18
Payer: MEDICAID

## 2023-04-18 VITALS
WEIGHT: 193.81 LBS | DIASTOLIC BLOOD PRESSURE: 78 MMHG | HEIGHT: 66 IN | SYSTOLIC BLOOD PRESSURE: 118 MMHG | HEART RATE: 78 BPM | BODY MASS INDEX: 31.15 KG/M2

## 2023-04-18 DIAGNOSIS — S83.242D ACUTE MEDIAL MENISCUS TEAR, LEFT, SUBSEQUENT ENCOUNTER: ICD-10-CM

## 2023-04-18 DIAGNOSIS — S83.512D RUPTURE OF ANTERIOR CRUCIATE LIGAMENT OF LEFT KNEE, SUBSEQUENT ENCOUNTER: Primary | ICD-10-CM

## 2023-04-18 PROCEDURE — 99214 OFFICE O/P EST MOD 30 MIN: CPT | Mod: PBBFAC,PN

## 2023-04-18 PROCEDURE — 3078F PR MOST RECENT DIASTOLIC BLOOD PRESSURE < 80 MM HG: ICD-10-PCS | Mod: CPTII,,,

## 2023-04-18 PROCEDURE — 1159F MED LIST DOCD IN RCRD: CPT | Mod: CPTII,,,

## 2023-04-18 PROCEDURE — 99999 PR PBB SHADOW E&M-EST. PATIENT-LVL IV: ICD-10-PCS | Mod: PBBFAC,,,

## 2023-04-18 PROCEDURE — 99214 OFFICE O/P EST MOD 30 MIN: CPT | Mod: S$PBB,,,

## 2023-04-18 PROCEDURE — 3078F DIAST BP <80 MM HG: CPT | Mod: CPTII,,,

## 2023-04-18 PROCEDURE — 3008F BODY MASS INDEX DOCD: CPT | Mod: CPTII,,,

## 2023-04-18 PROCEDURE — 3008F PR BODY MASS INDEX (BMI) DOCUMENTED: ICD-10-PCS | Mod: CPTII,,,

## 2023-04-18 PROCEDURE — 3074F SYST BP LT 130 MM HG: CPT | Mod: CPTII,,,

## 2023-04-18 PROCEDURE — 1159F PR MEDICATION LIST DOCUMENTED IN MEDICAL RECORD: ICD-10-PCS | Mod: CPTII,,,

## 2023-04-18 PROCEDURE — 3074F PR MOST RECENT SYSTOLIC BLOOD PRESSURE < 130 MM HG: ICD-10-PCS | Mod: CPTII,,,

## 2023-04-18 PROCEDURE — 99999 PR PBB SHADOW E&M-EST. PATIENT-LVL IV: CPT | Mod: PBBFAC,,,

## 2023-04-18 PROCEDURE — 99214 PR OFFICE/OUTPT VISIT, EST, LEVL IV, 30-39 MIN: ICD-10-PCS | Mod: S$PBB,,,

## 2023-04-18 RX ORDER — CEFAZOLIN SODIUM 2 G/50ML
2 SOLUTION INTRAVENOUS
Status: CANCELLED | OUTPATIENT
Start: 2023-04-18

## 2023-04-18 NOTE — TELEPHONE ENCOUNTER
Patient advised she will be out of town from May 18th to May 27th will need to reschedule surgery date. Can you please advise on surgery date after May 27th.

## 2023-04-18 NOTE — LETTER
April 18, 2023      [facility]  Orthopedics  [address], [facility] [address]  [place] [address]  Phone: [phone]  Fax: [phone]       Patient: Trinity Peres  YOB: 1995  Date of Visit: 04/18/2023    To Whom It May Concern:    Trinity Peres was at  on 04/18/2023. She will be able to do desk duty for the time being. She is requiring surgery, scheduled for 5/22/2023 and will need rehab after. She is to refrain from activity. If you have any questions or concerns, or if I can be of further assistance, please do not hesitate to contact my office.    Sincerely,    Fabiola Pool PA-C

## 2023-04-18 NOTE — LETTER
April 19, 2023      Trinity Peres  [address]  [place] [address]             [facility]  Orthopedics  [address], [facility] [[email or web address] [address]  [facility] [address]  Phone: [phone]  Fax: [phone] Patient: Trinity Peres  YOB: 1995  Date of Visit: 04/18/2023     To Whom It May Concern:     Trinity Peres was at [place] on 04/18/2023. She will be able to do desk duty for the time being. She is requiring surgery, scheduled for 6/12/2023 and will need rehab after. She is to refrain from activity. If you have any questions or concerns, or if I can be of further assistance, please do not hesitate to contact my office.     Sincerely,         Fabiola Pool PA-C

## 2023-04-18 NOTE — TELEPHONE ENCOUNTER
----- Message from Erum [address] sent at 4/18/2023  2:46 PM CDT -----  Regarding: Verify Surgery  Contact: Pt @ [phone]  Pt is calling to verify surgery date, she was told it was on May 15th, portal stated 5/22. Asking for a call back

## 2023-04-18 NOTE — PROGRESS NOTES
"Patient ID: Trinity Peres is a 27 y.o. female    Results of the Left Knee      History of Present Illness:    Trinity Peres presents to clinic for left knee and ankle pain. Patient reports injury, states on 3/5/2023 she was getting out of a truck, felt her knee buckle and ankle "pop". She went to ER, acute fracture or dislocation ruled out.  Patient reports that she initially was unable to flex her knee, she is now able to fully extend and flex her knee with mild pain.  She does report an overall improvement in her symptoms but her knee pain is still bothersome and she does note instability. The pain started 3 weeks ago.  Pain is located over (points to) posterior left knee. She reports that the pain is a 4 /10 sharp pain toda. The pain is affecting ADLs and limiting desired level of activity. Denies numbness, tingling, radiation and inability to bear weight.    Trial of ibuprofen with some improvement.  She also feels like her Achilles is pulling.  Notes pain on the lateral aspect of her left ankle as well.    Mechanical symptoms: +  Instability: +    Occupation: unemployed     Ambulating: unassisted  Diabetic: no  Smoking: no  Hx of DVT/PE: no     ____________________________________________________________________    Interval history 4/18/2023 : Patient returns today for follow up of left knee MRI results.  She continues to endorse pain on the lateral aspect of her left knee. Endorses instability and mechanical symptoms as well.       PAST MEDICAL HISTORY: No past medical history on file.  PAST SURGICAL HISTORY: No past surgical history on file.  FAMILY HISTORY: No family history on file.  SOCIAL HISTORY:   Social History     Occupational History    Not on file   Tobacco Use    Smoking status: Never    Smokeless tobacco: Never   Substance and Sexual Activity    Alcohol use: Never    Drug use: Never    Sexual activity: Yes     Partners: Female        MEDICATIONS:   Current Outpatient Medications:     " FLUoxetine 10 MG capsule, Take 1 capsule (10 mg total) by mouth once daily., Disp: 30 capsule, Rfl: 1    ibuprofen (ADVIL,MOTRIN) 600 MG tablet, Take 1 tablet (600 mg total) by mouth every 6 (six) hours as needed for Pain., Disp: 20 tablet, Rfl: 0    neomycin-polymyxin-dexamethasone (MAXITROL) 3.5mg/mL-10,000 unit/mL-0.1 % DrpS, Place 1 drop into the left eye 4 (four) times daily., Disp: , Rfl:     olopatadine (PATANOL) 0.1 % ophthalmic solution, Place 1 drop into both eyes 2 (two) times daily., Disp: , Rfl:     fluticasone propionate (FLOVENT HFA) 44 mcg/actuation inhaler, Inhale 1 puff into the lungs 2 (two) times daily. Controller, Disp: 10.6 g, Rfl: 0  ALLERGIES: Review of patient's allergies indicates:  No Known Allergies      Physical Exam     Vitals:    04/18/23 1036   BP: 118/78   Pulse: 78       Alert and oriented to person, place and time. No acute distress. Well-groomed, not ill appearing. Pupils round and reactive, normal respiratory effort, no audible wheezing.     OBSERVATION / INSPECTION   Gait:   Nonantalgic   Alignment:  Neutral   Scars:   None   Muscle atrophy: None  Effusion:  None   Warmth:  None   Discoloration:   None     TENDERNESS                 Patella     Negative    Peripatellar medial    Negative   Peripatellar lateral   +   Patellar tendon   Negative   Quad tendon     Negative    Prepatellar Bursa   Negative     Tibial tubercle    Negative    Pes anserine/HS   Negative      ITB     Negative      LCL     Negative  MFC     Negative  LFC     Negative  MCL      +  Medial Joint Line    +   Lateral Joint Line   Negative  Quadriceps    Negative  Hamstring     Negative            Range of  Motion:        Left knee:   0-120° *  Right knee:    0-140°      Patellofemoral examination:     Patella position    Centered  Crepitus (PF):    Absent   Lateral tilt:    Normal   J-sign:     None   Patellofemoral grind:   No pain       MENISCAL SIGNS:     Pain on terminal extension:  +  Pain on terminal  flexion:  Negative  Asaels maneuver:  +    LIGAMENT EXAMINATION:  ACL / Lachman:  normal   PCL-Post.  drawer: normal 0 to 2mm  MCL- Valgus:  normal 0 to 2mm  LCL- Varus:    normal 0 to 2mm    Dial Test: difference c/w other side  At 30° flexion: normal (< 5°)   At 90° flexion: normal (< 5°)       STRENGTH: (* = with pain)   Quadricep   5/5  Hamstrin/5    EXTREMITY NEURO-VASCULAR EXAMINATION:   Sensation:  Grossly intact to light touch all dermatomal regions.   Motor Function:  Fully intact motor function at hip, knee, foot and ankle    DTRs;  quadriceps and  achilles 2+.  No clonus and downgoing Babinski.    Vascular status:  DP and PT pulses 2+, brisk capillary refill, symmetric.     Imaging:     Left knee, tib fib, and ankle X-rays ordered/reviewed by me showing no evidence of fracture or dislocation. There is no obvious malalignment. No evidence of masses, lesions or foreign bodies.     MRI left knee: complete ACL tear, posterior medial meniscus tear, and contusion of lateral femoral condyle and posterior lateral tibial plateau.      Assessment & Plan    Rupture of anterior cruciate ligament of left knee, subsequent encounter    Acute medial meniscus tear, left, subsequent encounter         I made the decision to obtain old records of the patient including previous notes and imaging. New imaging was ordered today of the extremity or extremities evaluated. I independently reviewed and interpreted the radiographs and/or MRIs/CT scan today as well as prior imaging.    We discussed at length different treatment options including conservative vs surgical management. These include anti-inflammatories, acetaminophen, rest, ice, heat, formal physical therapy including strengthening and stretching exercises, home exercise programs, injections, dry needling, and finally surgical intervention.      1. Imaging results reviewed today and discussed with Yrn Rodriguez MD. We reviewed with Trinity Peres  today, the pathology and natural history of her diagnosis. We have discussed a variety of treatment options including medications, physical therapy and other alternative treatments. I also explained the indications, risks and benefits of surgery. After discussion, she decided to proceed with surgery.      Left knee arthroscopy with ACL reconstruction using allograft  Left knee medial meniscus repair versus debridement  All other indicated procedures    Case request placed, plan for surgery 5/22/2023  RTC for pre-op and meet [facility]      DME/Bracing:  T scope  Post-op Physical Therapy to begin: immediately post-op     Medical Clearance: No  Hx of DVT,PE, anesthetic complications: No     Additional notes/concerns:  patella tendon Allograft     The details of the surgical procedure were explained, including the location of probable incisions and a description of likely hardware and/or grafts to be used. The patient understands the likely convalescence after surgery. Also, we have thoroughly discussed the risks, benefits and alternatives to surgery, including, but not limited to, the risk of infection, joint stiffness, blood clot (including DVT and/or pulmonary embolus), neurologic and vascular injury.  It was explained that, if tissue has been repaired or reconstructed, there is a chance of failure, which may require further management.    2. Ice compress to the affected area 2-3x a day for 15-20 minutes as needed for pain management.  3. Case request placed, plan for surgery on 5/22/2023  4. Patient does not need pre-op clearance from PCP  5. Patient will return for pre-op appointment     Follow up: pre op  X-rays next visit: none    All of the patient's questions were answered and the patient will contact us if they have any questions or concerns in the interim.

## 2023-04-18 NOTE — TELEPHONE ENCOUNTER
----- Message from Mayra Raymundo sent at 4/18/2023 10:58 AM CDT -----  Regarding: appt  Contact: [phone]  Pt requesting a appointment for the following  for her upcoming surgery the date that was talked about is no longer going to work she will be out of town ..Please call and adv @ [phone]

## 2023-04-19 NOTE — TELEPHONE ENCOUNTER
Patient will have surgery on 6/12/2023. Would like Renee to write another letter for her [employer] for new surgery date.

## 2023-05-10 ENCOUNTER — OFFICE VISIT (OUTPATIENT)
Dept: PRIMARY CARE CLINIC | Facility: CLINIC | Age: 28
End: 2023-05-10
Payer: MEDICAID

## 2023-05-10 VITALS
WEIGHT: 197.06 LBS | SYSTOLIC BLOOD PRESSURE: 121 MMHG | DIASTOLIC BLOOD PRESSURE: 70 MMHG | HEART RATE: 79 BPM | BODY MASS INDEX: 32.3 KG/M2

## 2023-05-10 DIAGNOSIS — F41.9 ANXIETY AND DEPRESSION: Primary | ICD-10-CM

## 2023-05-10 DIAGNOSIS — F32.A ANXIETY AND DEPRESSION: Primary | ICD-10-CM

## 2023-05-10 PROCEDURE — 3008F BODY MASS INDEX DOCD: CPT | Mod: CPTII,,, | Performed by: STUDENT IN AN ORGANIZED HEALTH CARE EDUCATION/TRAINING PROGRAM

## 2023-05-10 PROCEDURE — 1159F MED LIST DOCD IN RCRD: CPT | Mod: CPTII,,, | Performed by: STUDENT IN AN ORGANIZED HEALTH CARE EDUCATION/TRAINING PROGRAM

## 2023-05-10 PROCEDURE — 3074F SYST BP LT 130 MM HG: CPT | Mod: CPTII,,, | Performed by: STUDENT IN AN ORGANIZED HEALTH CARE EDUCATION/TRAINING PROGRAM

## 2023-05-10 PROCEDURE — 3078F DIAST BP <80 MM HG: CPT | Mod: CPTII,,, | Performed by: STUDENT IN AN ORGANIZED HEALTH CARE EDUCATION/TRAINING PROGRAM

## 2023-05-10 PROCEDURE — 3078F PR MOST RECENT DIASTOLIC BLOOD PRESSURE < 80 MM HG: ICD-10-PCS | Mod: CPTII,,, | Performed by: STUDENT IN AN ORGANIZED HEALTH CARE EDUCATION/TRAINING PROGRAM

## 2023-05-10 PROCEDURE — 99999 PR PBB SHADOW E&M-EST. PATIENT-LVL III: ICD-10-PCS | Mod: PBBFAC,,, | Performed by: STUDENT IN AN ORGANIZED HEALTH CARE EDUCATION/TRAINING PROGRAM

## 2023-05-10 PROCEDURE — 1159F PR MEDICATION LIST DOCUMENTED IN MEDICAL RECORD: ICD-10-PCS | Mod: CPTII,,, | Performed by: STUDENT IN AN ORGANIZED HEALTH CARE EDUCATION/TRAINING PROGRAM

## 2023-05-10 PROCEDURE — 99213 OFFICE O/P EST LOW 20 MIN: CPT | Mod: S$PBB,,, | Performed by: STUDENT IN AN ORGANIZED HEALTH CARE EDUCATION/TRAINING PROGRAM

## 2023-05-10 PROCEDURE — 99999 PR PBB SHADOW E&M-EST. PATIENT-LVL III: CPT | Mod: PBBFAC,,, | Performed by: STUDENT IN AN ORGANIZED HEALTH CARE EDUCATION/TRAINING PROGRAM

## 2023-05-10 PROCEDURE — 3008F PR BODY MASS INDEX (BMI) DOCUMENTED: ICD-10-PCS | Mod: CPTII,,, | Performed by: STUDENT IN AN ORGANIZED HEALTH CARE EDUCATION/TRAINING PROGRAM

## 2023-05-10 PROCEDURE — 99213 OFFICE O/P EST LOW 20 MIN: CPT | Mod: PBBFAC,PN | Performed by: STUDENT IN AN ORGANIZED HEALTH CARE EDUCATION/TRAINING PROGRAM

## 2023-05-10 PROCEDURE — 3074F PR MOST RECENT SYSTOLIC BLOOD PRESSURE < 130 MM HG: ICD-10-PCS | Mod: CPTII,,, | Performed by: STUDENT IN AN ORGANIZED HEALTH CARE EDUCATION/TRAINING PROGRAM

## 2023-05-10 PROCEDURE — 99213 PR OFFICE/OUTPT VISIT, EST, LEVL III, 20-29 MIN: ICD-10-PCS | Mod: S$PBB,,, | Performed by: STUDENT IN AN ORGANIZED HEALTH CARE EDUCATION/TRAINING PROGRAM

## 2023-05-10 RX ORDER — FLUOXETINE HYDROCHLORIDE 20 MG/1
20 CAPSULE ORAL DAILY
Qty: 30 CAPSULE | Refills: 1 | Status: SHIPPED | OUTPATIENT
Start: 2023-05-10 | End: 2023-06-07 | Stop reason: ALTCHOICE

## 2023-05-10 NOTE — PROGRESS NOTES
05/10/2023    Trinity Peres  [id number]    Chief Complaint   Patient presents with    Med Change Follow Up       HPI    This pt is known to me and presents for medication follow up. 4 weeks ago started fluoxetine 10 mg. Has not noticed any change at all. Still with crying spells and sleep disturbance. Has been taking it every morning. Of note, patient will be having surgery on June 12 to ACL and meniscus repair. No hx of surgery. Will be helped post op by family. 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 No questions today.       Negative 10 point ROS outside of HPI    Social History     Socioeconomic History    Marital status: Single   Tobacco Use    Smoking status: Never    Smokeless tobacco: Never   Substance and Sexual Activity    Alcohol use: Never    Drug use: Never    Sexual activity: Yes     Partners: Female           Current Outpatient Medications:     FLUoxetine 10 MG capsule, Take 1 capsule (10 mg total) by mouth once daily., Disp: 30 capsule, Rfl: 1    ibuprofen (ADVIL,MOTRIN) 600 MG tablet, Take 1 tablet (600 mg total) by mouth every 6 (six) hours as needed for Pain., Disp: 20 tablet, Rfl: 0    neomycin-polymyxin-dexamethasone (MAXITROL) 3.5mg/mL-10,000 unit/mL-0.1 % DrpS, Place 1 drop into the left eye 4 (four) times daily., Disp: , Rfl:     olopatadine (PATANOL) 0.1 % ophthalmic solution, Place 1 drop into both eyes 2 (two) times daily., Disp: , Rfl:     fluticasone propionate (FLOVENT HFA) 44 mcg/actuation inhaler, Inhale 1 puff into the lungs 2 (two) times daily. Controller, Disp: 10.6 g, Rfl: 0      Physical Exam  Vitals:    05/10/23 0947   BP: 121/70   Pulse: 79     Gen: well appearing, NAD  Resp: non labored breathing, no crackles, no wheezes, CTAB  CV: RRR no murmur, gallops, rubs, no LE edema    1. Anxiety and depression  - increase FLUoxetine 20 MG capsule; Take 1 capsule (20 mg total) by mouth once daily.  Dispense: 30 capsule; Refill:  1  -follow up in 3-4 week  Will try alt agent if no improvement    RTC in 3-4 weeks    Ana Maria Peter MD  Family Medicine

## 2023-05-15 ENCOUNTER — OFFICE VISIT (OUTPATIENT)
Dept: ORTHOPEDICS | Facility: CLINIC | Age: 28
End: 2023-05-15
Payer: MEDICAID

## 2023-05-15 VITALS
HEART RATE: 67 BPM | BODY MASS INDEX: 31.79 KG/M2 | WEIGHT: 194 LBS | SYSTOLIC BLOOD PRESSURE: 124 MMHG | DIASTOLIC BLOOD PRESSURE: 84 MMHG

## 2023-05-15 DIAGNOSIS — S83.512D RUPTURE OF ANTERIOR CRUCIATE LIGAMENT OF LEFT KNEE, SUBSEQUENT ENCOUNTER: Primary | ICD-10-CM

## 2023-05-15 DIAGNOSIS — S83.242D ACUTE MEDIAL MENISCUS TEAR, LEFT, SUBSEQUENT ENCOUNTER: ICD-10-CM

## 2023-05-15 DIAGNOSIS — Z01.818 PRE-OP EXAM: ICD-10-CM

## 2023-05-15 PROCEDURE — 3008F BODY MASS INDEX DOCD: CPT | Mod: CPTII,,,

## 2023-05-15 PROCEDURE — 1159F PR MEDICATION LIST DOCUMENTED IN MEDICAL RECORD: ICD-10-PCS | Mod: CPTII,,,

## 2023-05-15 PROCEDURE — 3079F PR MOST RECENT DIASTOLIC BLOOD PRESSURE 80-89 MM HG: ICD-10-PCS | Mod: CPTII,,,

## 2023-05-15 PROCEDURE — 99214 OFFICE O/P EST MOD 30 MIN: CPT | Mod: S$PBB,,,

## 2023-05-15 PROCEDURE — 3079F DIAST BP 80-89 MM HG: CPT | Mod: CPTII,,,

## 2023-05-15 PROCEDURE — 99214 PR OFFICE/OUTPT VISIT, EST, LEVL IV, 30-39 MIN: ICD-10-PCS | Mod: S$PBB,,,

## 2023-05-15 PROCEDURE — 99213 OFFICE O/P EST LOW 20 MIN: CPT | Mod: PBBFAC,PN

## 2023-05-15 PROCEDURE — 3074F SYST BP LT 130 MM HG: CPT | Mod: CPTII,,,

## 2023-05-15 PROCEDURE — 99999 PR PBB SHADOW E&M-EST. PATIENT-LVL III: CPT | Mod: PBBFAC,,,

## 2023-05-15 PROCEDURE — 3074F PR MOST RECENT SYSTOLIC BLOOD PRESSURE < 130 MM HG: ICD-10-PCS | Mod: CPTII,,,

## 2023-05-15 PROCEDURE — 99999 PR PBB SHADOW E&M-EST. PATIENT-LVL III: ICD-10-PCS | Mod: PBBFAC,,,

## 2023-05-15 PROCEDURE — 1159F MED LIST DOCD IN RCRD: CPT | Mod: CPTII,,,

## 2023-05-15 PROCEDURE — 3008F PR BODY MASS INDEX (BMI) DOCUMENTED: ICD-10-PCS | Mod: CPTII,,,

## 2023-05-15 NOTE — PROGRESS NOTES
"Patient ID: Trinity Peres is a 27 y.o. female    CC: pre op left knee ACL reconstruction      History of Present Illness:    Trinity Peres presents to clinic for left knee and ankle pain. Patient reports injury, states on 3/5/2023 she was getting out of a truck, felt her knee buckle and ankle "pop". She went to ER, acute fracture or dislocation ruled out.  Patient reports that she initially was unable to flex her knee, she is now able to fully extend and flex her knee with mild pain.  She does report an overall improvement in her symptoms but her knee pain is still bothersome and she does note instability. The pain started 3 weeks ago.  Pain is located over (points to) posterior left knee. She reports that the pain is a 4 /10 sharp pain toda. The pain is affecting ADLs and limiting desired level of activity. Denies numbness, tingling, radiation and inability to bear weight.    Trial of ibuprofen with some improvement.  She also feels like her Achilles is pulling.  Notes pain on the lateral aspect of her left ankle as well.    Mechanical symptoms: +  Instability: +    Occupation: unemployed     Ambulating: unassisted  Diabetic: no  Smoking: no  Hx of DVT/PE: no     ____________________________________________________________________    Interval history 4/18/2023 : Patient returns today for follow up of left knee MRI results.  She continues to endorse pain on the lateral aspect of her left knee. Endorses instability and mechanical symptoms as well.     ____________________________________________________________________    Interval history 5/15/2023: Patient returns today for pre op. She is scheduled for left knee ACL reconstruction on 6/12/2023.        PAST MEDICAL HISTORY: No past medical history on file.  PAST SURGICAL HISTORY: No past surgical history on file.  FAMILY HISTORY: No family history on file.  SOCIAL HISTORY:   Social History     Occupational History    Not on file   Tobacco Use    Smoking status: " Never    Smokeless tobacco: Never   Substance and Sexual Activity    Alcohol use: Never    Drug use: Never    Sexual activity: Yes     Partners: Female        MEDICATIONS:   Current Outpatient Medications:     FLUoxetine 20 MG capsule, Take 1 capsule (20 mg total) by mouth once daily., Disp: 30 capsule, Rfl: 1    fluticasone propionate (FLOVENT HFA) 44 mcg/actuation inhaler, Inhale 1 puff into the lungs 2 (two) times daily. Controller, Disp: 10.6 g, Rfl: 0    ibuprofen (ADVIL,MOTRIN) 600 MG tablet, Take 1 tablet (600 mg total) by mouth every 6 (six) hours as needed for Pain., Disp: 20 tablet, Rfl: 0    neomycin-polymyxin-dexamethasone (MAXITROL) 3.5mg/mL-10,000 unit/mL-0.1 % DrpS, Place 1 drop into the left eye 4 (four) times daily., Disp: , Rfl:     olopatadine (PATANOL) 0.1 % ophthalmic solution, Place 1 drop into both eyes 2 (two) times daily., Disp: , Rfl:   ALLERGIES: Review of patient's allergies indicates:  No Known Allergies      Physical Exam     There were no vitals filed for this visit.      Alert and oriented to person, place and time. No acute distress. Well-groomed, not ill appearing. Pupils round and reactive, normal respiratory effort, no audible wheezing.     OBSERVATION / INSPECTION   Gait:   Nonantalgic   Alignment:  Neutral   Scars:   None   Muscle atrophy: None  Effusion:  None   Warmth:  None   Discoloration:   None     TENDERNESS                 Patella     Negative    Peripatellar medial    Negative   Peripatellar lateral   +   Patellar tendon   Negative   Quad tendon     Negative    Prepatellar Bursa   Negative     Tibial tubercle    Negative    Pes anserine/HS   Negative      ITB     Negative      LCL     Negative  MFC     Negative  LFC     Negative  MCL      +  Medial Joint Line    +   Lateral Joint Line   Negative  Quadriceps    Negative  Hamstring     Negative            Range of  Motion:        Left knee:   0-120° *  Right knee:    0-140°      Patellofemoral examination:     Patella  position    Centered  Crepitus (PF):    Absent   Lateral tilt:    Normal   J-sign:     None   Patellofemoral grind:   No pain       MENISCAL SIGNS:     Pain on terminal extension:  +  Pain on terminal flexion:  Negative  Asaels maneuver:  +    LIGAMENT EXAMINATION:  ACL / Lachman:  normal   PCL-Post.  drawer: normal 0 to 2mm  MCL- Valgus:  normal 0 to 2mm  LCL- Varus:    normal 0 to 2mm    Dial Test: difference c/w other side  At 30° flexion: normal (< 5°)   At 90° flexion: normal (< 5°)       STRENGTH: (* = with pain)   Quadricep   5/5  Hamstrin/5    EXTREMITY NEURO-VASCULAR EXAMINATION:   Sensation:  Grossly intact to light touch all dermatomal regions.   Motor Function:  Fully intact motor function at hip, knee, foot and ankle    DTRs;  quadriceps and  achilles 2+.  No clonus and downgoing Babinski.    Vascular status:  DP and PT pulses 2+, brisk capillary refill, symmetric.     Imaging:     Left knee, tib fib, and ankle X-rays ordered/reviewed by me showing no evidence of fracture or dislocation. There is no obvious malalignment. No evidence of masses, lesions or foreign bodies.     MRI left knee: complete ACL tear, posterior medial meniscus tear, and contusion of lateral femoral condyle and posterior lateral tibial plateau.      Assessment & Plan    Rupture of anterior cruciate ligament of left knee, subsequent encounter    Acute medial meniscus tear, left, subsequent encounter           I made the decision to obtain old records of the patient including previous notes and imaging. New imaging was ordered today of the extremity or extremities evaluated. I independently reviewed and interpreted the radiographs and/or MRIs/CT scan today as well as prior imaging.    We discussed at length different treatment options including conservative vs surgical management. These include anti-inflammatories, acetaminophen, rest, ice, heat, formal physical therapy including strengthening and stretching exercises,  home exercise programs, injections, dry needling, and finally surgical intervention.      1. Imaging results reviewed today and discussed with Yrn Rodriguez MD. We reviewed with Trinity Peres today, the pathology and natural history of her diagnosis. We have discussed a variety of treatment options including medications, physical therapy and other alternative treatments. I also explained the indications, risks and benefits of surgery. After discussion, she decided to proceed with surgery.      Left knee arthroscopy with ACL reconstruction using allograft  Left knee medial meniscus repair versus debridement  All other indicated procedures      DME/Bracing:  T scope  Post-op Physical Therapy to begin: immediately post-op     Medical Clearance: No  Hx of DVT,PE, anesthetic complications: No     Additional notes/concerns:  patella tendon Allograft     The details of the surgical procedure were explained, including the location of probable incisions and a description of likely hardware and/or grafts to be used. The patient understands the likely convalescence after surgery. Also, we have thoroughly discussed the risks, benefits and alternatives to surgery, including, but not limited to, the risk of infection, joint stiffness, blood clot (including DVT and/or pulmonary embolus), neurologic and vascular injury.  It was explained that, if tissue has been repaired or reconstructed, there is a chance of failure, which may require further management.    Dr. Rodriguez was present in clinic during this pre-op evaluation. The patient was offered the opportunity to ask Dr. Rodriguez any further questions regarding the procedure which may not have been addressed during their previous informed consent discussion. As there were no other questions to be asked, she was given my business card along with Dr. Pavon's business card if she has any questions or concerns prior to surgery or in the postop period.     2. Ice compress to the  affected area 2-3x a day for 15-20 minutes as needed for pain management.  3. Pre op labs ordered    Follow up: 2 weeks post op  X-rays next visit: left knee 1 view    All of the patient's questions were answered and the patient will contact us if they have any questions or concerns in the interim.

## 2023-06-02 ENCOUNTER — TELEPHONE (OUTPATIENT)
Dept: PRIMARY CARE CLINIC | Facility: CLINIC | Age: 28
End: 2023-06-02
Payer: MEDICAID

## 2023-06-02 NOTE — TELEPHONE ENCOUNTER
Spoke w/Dr Peter re: changing appt to virtual, Dr. Peter states it is ok. Appt changed to virtual pt notified.

## 2023-06-02 NOTE — TELEPHONE ENCOUNTER
----- Message from Remedios Molina sent at 6/2/2023 12:15 PM CDT -----  Contact: [phone]  Patient is calling for Medical Advice regarding: Patient would like to know if her appointment tjhat is scheduled for 9:40 on June 7, 2023 can be changed to a virtual visit? Please call to advise.

## 2023-06-07 ENCOUNTER — OFFICE VISIT (OUTPATIENT)
Dept: PRIMARY CARE CLINIC | Facility: CLINIC | Age: 28
End: 2023-06-07
Payer: MEDICAID

## 2023-06-07 DIAGNOSIS — F41.9 ANXIETY AND DEPRESSION: Primary | ICD-10-CM

## 2023-06-07 DIAGNOSIS — F32.A ANXIETY AND DEPRESSION: Primary | ICD-10-CM

## 2023-06-07 PROCEDURE — 99213 OFFICE O/P EST LOW 20 MIN: CPT | Mod: 95,,, | Performed by: STUDENT IN AN ORGANIZED HEALTH CARE EDUCATION/TRAINING PROGRAM

## 2023-06-07 PROCEDURE — 99213 PR OFFICE/OUTPT VISIT, EST, LEVL III, 20-29 MIN: ICD-10-PCS | Mod: 95,,, | Performed by: STUDENT IN AN ORGANIZED HEALTH CARE EDUCATION/TRAINING PROGRAM

## 2023-06-07 RX ORDER — SERTRALINE HYDROCHLORIDE 50 MG/1
50 TABLET, FILM COATED ORAL DAILY
Qty: 30 TABLET | Refills: 1 | Status: SHIPPED | OUTPATIENT
Start: 2023-06-07 | End: 2023-09-08 | Stop reason: SDUPTHER

## 2023-06-07 NOTE — PROGRESS NOTES
06/07/2023    Trinity Peres  [id number]    CC: med check    HPI  This pt is well to me and presents virtually for medication check. Active dx: anxiety/depression.     Did not notice a difference since the increase with fluoxetine 20 mg. Still with crying spells and trouble sleeping. Would like to try alt agent.   Of note will be having knee surgery in a few days.      Negative 10 point ROS outside of HPI    Social History     Socioeconomic History    Marital status: Single   Tobacco Use    Smoking status: Never    Smokeless tobacco: Never   Substance and Sexual Activity    Alcohol use: Never    Drug use: Never    Sexual activity: Yes     Partners: Female           Current Outpatient Medications:     FLUoxetine 20 MG capsule, Take 1 capsule (20 mg total) by mouth once daily., Disp: 30 capsule, Rfl: 1    fluticasone propionate (FLOVENT HFA) 44 mcg/actuation inhaler, Inhale 1 puff into the lungs 2 (two) times daily. Controller (Patient not taking: Reported on 6/1/2023), Disp: 10.6 g, Rfl: 0    ibuprofen (ADVIL,MOTRIN) 600 MG tablet, Take 1 tablet (600 mg total) by mouth every 6 (six) hours as needed for Pain., Disp: 20 tablet, Rfl: 0    neomycin-polymyxin-dexamethasone (MAXITROL) 3.5mg/mL-10,000 unit/mL-0.1 % DrpS, Place 1 drop into the left eye 4 (four) times daily., Disp: , Rfl:     olopatadine (PATANOL) 0.1 % ophthalmic solution, Place 1 drop into both eyes 2 (two) times daily., Disp: , Rfl:       Physical Exam  Vitals unable to obtain    Gen: well appearing  Resp: speaks in full sentences. Non labored breathing  Cv: non-diaphoretic    1. Anxiety and depression  No change; will do a direct switch  - START sertraline (ZOLOFT) 50 MG tablet; Take 1 tablet (50 mg total) by mouth once daily.  Dispense: 30 tablet; Refill: 1  -STOP fluoxetine 20 mg     RTC in 6 weeks for medication check    Ana Maria Peter MD  Family Medicine

## 2023-06-12 DIAGNOSIS — G89.18 POST-OP PAIN: Primary | ICD-10-CM

## 2023-06-12 RX ORDER — ASPIRIN 81 MG/1
81 TABLET ORAL 2 TIMES DAILY
Qty: 28 TABLET | Refills: 0 | Status: SHIPPED | OUTPATIENT
Start: 2023-06-12 | End: 2023-07-09 | Stop reason: CLARIF

## 2023-06-12 RX ORDER — OXYCODONE AND ACETAMINOPHEN 5; 325 MG/1; MG/1
1 TABLET ORAL EVERY 4 HOURS PRN
Qty: 28 EACH | Refills: 0 | Status: SHIPPED | OUTPATIENT
Start: 2023-06-12 | End: 2023-06-23 | Stop reason: SDUPTHER

## 2023-06-12 RX ORDER — ONDANSETRON 4 MG/1
4 TABLET, ORALLY DISINTEGRATING ORAL 2 TIMES DAILY
Qty: 20 TABLET | Refills: 0 | Status: SHIPPED | OUTPATIENT
Start: 2023-06-12

## 2023-06-12 RX ORDER — IBUPROFEN 600 MG/1
600 TABLET ORAL 3 TIMES DAILY
Qty: 60 TABLET | Refills: 0 | Status: SHIPPED | OUTPATIENT
Start: 2023-06-12 | End: 2024-01-10

## 2023-06-13 ENCOUNTER — CLINICAL SUPPORT (OUTPATIENT)
Dept: REHABILITATION | Facility: HOSPITAL | Age: 28
End: 2023-06-13
Payer: MEDICAID

## 2023-06-13 DIAGNOSIS — S83.512D RUPTURE OF ANTERIOR CRUCIATE LIGAMENT OF LEFT KNEE, SUBSEQUENT ENCOUNTER: ICD-10-CM

## 2023-06-13 DIAGNOSIS — R29.898 DECREASED STRENGTH OF LOWER EXTREMITY: ICD-10-CM

## 2023-06-13 DIAGNOSIS — M25.662 DECREASED RANGE OF MOTION OF LEFT KNEE: ICD-10-CM

## 2023-06-13 DIAGNOSIS — S83.242D ACUTE MEDIAL MENISCUS TEAR, LEFT, SUBSEQUENT ENCOUNTER: ICD-10-CM

## 2023-06-13 DIAGNOSIS — R26.9 GAIT ABNORMALITY: ICD-10-CM

## 2023-06-13 PROCEDURE — 97161 PT EVAL LOW COMPLEX 20 MIN: CPT

## 2023-06-13 PROCEDURE — 97110 THERAPEUTIC EXERCISES: CPT

## 2023-06-13 NOTE — PLAN OF CARE
[facility]  Physical Therapy Initial Evaluation    Name: Trinity Peres  [facility] Number: [id number]    Therapy Diagnosis:   Encounter Diagnoses   Name Primary?    Rupture of anterior cruciate ligament of left knee, subsequent encounter     Acute medial meniscus tear, left, subsequent encounter     Gait abnormality     Decreased range of motion of left knee     Decreased strength of lower extremity      Physician: Fabiola Pool, *    Physician Orders: PT Eval and Treat  Medical Diagnosis from Referral:   Diagnosis   S83.512D (ICD-10-CM) - Rupture of anterior cruciate ligament of left knee, subsequent encounter   S83.242D (ICD-10-CM) - Acute medial meniscus tear, left, subsequent encounter     Evaluation Date: 6/13/2023  Authorization Period Expiration: 12/31/2023  Plan of Care Expiration: 12/31/2023  Progress Note Due: 7/11/2023  Visit # / Visits authorized: 1/ 1   FOTO #1 given at [facility]  FOTO Follow UP:     Time In: 1000 am  Time Out: 1045 am  Total Appointment Time (timed & untimed codes): 45 minutes    DOS: 6/12/23  S/p: L ACLr  PROCEDURE:    Arthroscopic assisted ACL reconstruction using bone patellar tendon bone autograft  Bone grafting left tibia and patella  Post-Op Precautions: NWB 2 weeks, Restrict ROM 0-90 deg; WBAT at the 2 week francisca    Precautions: Standard, Weightbearing, and post op ACLr    Subjective     Date of onset:   History of current condition - Trinity reports: she fell off the  truck 3/5/2023 and heard a pop. She has c/o instability and pain since. She has been on light duty at work. MRI revealed ACL rupture, and she underwent L ACLr BPB graft on 6/12/23 performed by Dr. Rodriguez. She reports that her brother is living with her post op to help her. He is able to stay as long as she needs him.      Imaging   MRI 4/8/23  Impression:  1. Constellation of findings suggesting recent pivot-shift mechanism of injury including:  *Complete ACL tear with suspected  superimposed scar tissue.  *Vertical peripheral tear body segment/posterior horn medial meniscus with surrounding synovitis.  *Reciprocating nondepressed subchondral fractures of the lateral femoral condyle and posterior lateral tibial plateau.  Osseous contusion of the posterior medial tibial plateau.  2.  Healed NOF of the distal femoral shaft.    X-ray 5/15/23  Impression:  No acute osseous abnormalities.    Prior Therapy: none  Exercise Routine/Sport Participation: H?REL PT test  Social History: single, lives alone but has help   Occupation: H?[employer]   Prior Level of Function: independent ADLs  Current Level of Function: NWB, dependent ADLs    Pain:  Current 5/10  Location: left knee  Description: Aching, Dull, and Tight  Aggravating Factors: Standing and Walking  Easing Factors: pain medication, ice, lying down, rest, and elevation    Pts goals: return to full duty work as an [employer]       Medical History:   No past medical history on file.    Surgical History:   Trinity Peres  has a past surgical history that includes Reconstruction of anterior cruciate ligament using graft (Left, 6/12/2023).    Medications:   Trinity has a current medication list which includes the following prescription(s): aspirin, flovent hfa, ibuprofen, neomycin-polymyxin-dexamethasone, olopatadine, ondansetron, oxycodone-acetaminophen, and sertraline.    Allergies:   Review of patient's allergies indicates:  No Known Allergies     Objective     1 day s/p    Gait: Not assessed secondary to post op NWB restriction  Bilateral crutches, swing through pattern    Knee Passive Range of Motion:   Right  Left    Flexion 141 Approx 30   Extension Hyper 8 Approx hyper 8       Quad Set: limited assessment secondary to post-op bandaging; slight quad activity visible      Joint Mobility: Not assessed secondary to post-op bandaging      Palpation: Not assessed secondary to post-op bandaging      Sensation: Not assessed secondary to  post-op bandaging      Edema:  Not assessed secondary to post-op bandaging    Special Tests: Not performed today due to post-op status   Strength: Not performed today due to post-op status.        CMS Impairment/Limitation/Restriction for FOTO Knee Survey    Therapist reviewed FOTO scores for Trinity Peres on 6/13/2023.   FOTO documents entered into [employer] - see Media section.    Limitation Score: 52%  Category: Mobility       Treatment     Treatment Time In: 1020 am  Treatment Time Out: 1045 am  Total Treatment time separate from Evaluation: 25 minutes     Trinity received the treatments listed below:      Therapeutic exercises to develop strength, endurance, ROM, flexibility, posture, and core stabilization for 25 minutes including:  HEP education  Ankle Pumps  Quat Sets  Heel Slides    Manual therapy techniques: Joint mobilizations and Soft tissue Mobilization were applied to the: left knee for 00 minutes, including:      Home Exercises and Patient Education Provided     Education provided:   - Surgical precautions: DVT, Infection, WB status, ROM limitations  - Prognosis, activity modification, goals for therapy, role of therapy for care, exercises/HEP    Written Home Exercises Provided: yes.  Exercises were reviewed and Trinity was able to demonstrate them prior to the end of the session.   Pt received a written copy of exercises to perform at home. Trinity demonstrated good  understanding of the education provided.     See EMR under patient instructions for exercises given.     Assessment     Trinity is a 27 y.o. female referred to outpatient Physical Therapy POD #1 s/p L ACLr BPB graft 6/12/23. She was educated and demonstrated good understanding of post-op precautions, timeframe for recovery, prognosis, objective findings and progress/goals, Tx rationale/options, HEP review/discussion on modification and or progression/regression, expectations for rehab, normal and expected soreness, activity  modification/avoidance/pacing, use of ICE/elevation of extremity, anatomy/physiology of pathology, benefits of exercise and physical therapy, use/fit of T scope, wound care, s/s infection and DVT. She will benefit continued skilled PT following this surgery to restore her functional independence.      Pt will benefit from skilled outpatient Physical Therapy to address the deficits stated above and in the chart below, provide pt/family education, and to maximize pt's level of independence. Pt prognosis is Good.     Plan of care discussed with patient: Yes  Pt's spiritual, cultural and educational needs considered and patient is agreeable to the plan of care and goals as stated below:       Anticipated Barriers for therapy: Standard      Medical Necessity is demonstrated by the following  History  Co-morbidities and personal factors that may impact the plan of care Co-morbidities:   none    Personal Factors:   no deficits     low   Examination  Body Structures and Functions, activity limitations and participation restrictions that may impact the plan of care Body Regions:   lower extremities    Body Systems:    gross symmetry  ROM  strength  gross coordinated movement  balance  gait  transfers  transitions  motor control  motor learning    Participation Restrictions:   Light duty at work, decreased community ambulation    Activity limitations:   Learning and applying knowledge  no deficits    General Tasks and Commands  no deficits    Communication  no deficits    Mobility  lifting and carrying objects  walking  driving (bike, car, motorcycle)    Self care  washing oneself (bathing, drying, washing hands)  caring for body parts (brushing teeth, shaving, grooming)  toileting  dressing    Domestic Life  shopping  cooking  doing house work (cleaning house, washing dishes, laundry)    Interactions/Relationships  no deficits    Life Areas  employment    Community and Social Life  community life  recreation and leisure        high   Clinical Presentation stable and uncomplicated low   Decision Making/ Complexity Score: low     Goals:  Short Term Goals: 8-10 weeks  1. Pt will be compliant with HEP 50% of prescribed amount.  2. The pt to demo improvement in L knee ROM to equal R knee ROM pain free   3.  The pt to demo good quad set with proper hyperextension moment of the L knee   4. The pt to demo ambualting with elast restricitve AD witout major compensatory pattern L knee for at least 20 feet      Long Term Goals: 6 months    Pt will be compliant with % of prescribed amount.   Patient will improve their FOTO limitation score to at least 17% as evidence of clinically significant improvements in their function.  The pt to demo pain free and uncompensated walking mechanics x10 min on an indoor treadmill  The pt to demo tolerance to a squat at or bellow parallel with uncompensated mechanics x10   The pt to demo strength of L LE within 10% of R LE as demo'd on the biodex machine   The pt will report full participation in ADLs and IADLs without restrictions related to L knee.       Plan   Plan of care Certification: 6/13/2023 to 12/31/2023.    Outpatient Physical Therapy 2 times weekly for 29 weeks to include the following interventions: Electrical Stimulation NMES, Gait Training, Manual Therapy, Neuromuscular Re-ed, Patient Education, Self Care, Therapeutic Activities, and Therapeutic Exercise.     Co-treated with Sepideh Yang, SPT    I certify that I was present in the room directing the student in service delivery and guiding them using my skilled judgment. As the co-signing therapist I have reviewed the students documentation and am responsible for the treatment, assessment, and plan.     Jessica Kevin, PT, DPT

## 2023-06-15 ENCOUNTER — CLINICAL SUPPORT (OUTPATIENT)
Dept: REHABILITATION | Facility: HOSPITAL | Age: 28
End: 2023-06-15
Payer: MEDICAID

## 2023-06-15 DIAGNOSIS — R26.9 GAIT ABNORMALITY: Primary | ICD-10-CM

## 2023-06-15 DIAGNOSIS — R29.898 DECREASED STRENGTH OF LOWER EXTREMITY: ICD-10-CM

## 2023-06-15 DIAGNOSIS — M25.662 DECREASED RANGE OF MOTION OF LEFT KNEE: ICD-10-CM

## 2023-06-15 PROCEDURE — 97110 THERAPEUTIC EXERCISES: CPT

## 2023-06-15 NOTE — PROGRESS NOTES
[facility]   Physical Therapy Treatment Note     Name: Trinity Peres  [facility] Number: [id number]    Therapy Diagnosis:   Encounter Diagnoses   Name Primary?    Gait abnormality Yes    Decreased range of motion of left knee     Decreased strength of lower extremity      Physician: Fabiola Pool, *    Visit Date: 6/15/2023    Physician: Fabiola Pool, *     Physician Orders: PT Eval and Treat  Medical Diagnosis from Referral:   Diagnosis   S83.512D (ICD-10-CM) - Rupture of anterior cruciate ligament of left knee, subsequent encounter   S83.242D (ICD-10-CM) - Acute medial meniscus tear, left, subsequent encounter      Evaluation Date: 6/13/2023  Authorization Period Expiration: 09/12/2023  Plan of Care Expiration: 12/31/2023  Progress Note Due: 7/11/2023  Visit # / Visits authorized: 1/8 (+ eval)  FOTO #1 given at eval     Time In: 0900 am  Time Out: 1000 am  Total Appointment Time (timed & untimed codes): 60 minutes - medicaid     DOS: 6/12/23  S/p: L ACLr  PROCEDURE:    Arthroscopic assisted ACL reconstruction using bone patellar tendon bone autograft  Bone grafting left tibia and patella  Post-Op Precautions: NWB 2 weeks, Restrict ROM 0-90 deg; WBAT at the 2 week francisca     Precautions: Standard, Weightbearing, and post op ACLr      SUBJECTIVE     Pt reports: she is staying on top of her pain with medications. Notes she was able to complete HEP.  She was compliant with home exercise program.  Response to previous treatment: improved quad activation  Functional change: no functional change    Pain: 3/10  Location: left knee      OBJECTIVE     3 days s/p     Objective Measures updated at progress report unless specified.     Knee Passive Range of Motion:    Right  Left    Flexion 141 69   Extension Hyper 8 Hyper 8       Treatment       Trinity received the treatments listed below:      THERAPEUTIC EXERCISES to develop strength, endurance, ROM, flexibility, posture, and core  stabilization for 00 minutes including:    MANUAL THERAPY TECHNIQUES including Joint mobilizations and Soft tissue Mobilization were applied to left knee for 15 minutes.  Bandage change  Fat pad mobilization to improve TKE  Patellar mobilization to improve flex/ext      NEUROMUSCULAR RE-EDUCATION ACTIVITIES to improve Balance, Coordination, Kinesthetic, Sense, Proprioception, and Posture for 45 minutes.  The following were included:   NMES:  - Quad Set  - Quad Set AA into hyperextension  - Heel Slides  - Heel Prop      THERAPEUTIC ACTIVITIES to improve dynamic and functional performance for 00 minutes including.    GAIT TRAINING to improve functional mobility and safety for 00 minutes.       Patient Education and Home Exercises     Home Exercises Provided and Patient Education Provided     Education provided:   - prognosis  - diagnosis  - continued HEP  - s/p precautions: DVT, infection, bandaging    Written Home Exercises Provided: yes. Exercises were reviewed and Trinity was able to demonstrate them prior to the end of the session.  Trinity demonstrated good  understanding of the education provided. See EMR under Patient Instructions for exercises provided during therapy sessions    ASSESSMENT     Trinity presented today with bilateral crutches with swing through gait pattern. She replaced her bandages yesterday with multiple small waterproof bandages, because her aquacell was full of blood. SMA Vic came to clinic to help change her bandaging with a new aquacell. He educated her on bandage management over the course of the next week. She tolerated NMES well and improved ability to activate quad compared to previous session. She continues to demonstrate deficits in quad activation in varying angles, and remains NWB per MD protocol. She tolerated PROM knee flexion to 69 degrees.    Trinity Is progressing well towards her goals.   Pt prognosis is Good.     Pt will continue to benefit from skilled  outpatient physical therapy to address the deficits listed in the problem list box on initial evaluation, provide pt/family education and to maximize pt's level of independence in the home and community environment.     Pt's spiritual, cultural and educational needs considered and pt agreeable to plan of care and goals.     Anticipated barriers to physical therapy: none    Goals:  Short Term Goals: 8-10 weeks  1. Pt will be compliant with HEP 50% of prescribed amount.  2. The pt to demo improvement in L knee ROM to equal R knee ROM pain free   3.  The pt to demo good quad set with proper hyperextension moment of the L knee   4. The pt to demo ambualting with elast restricitve AD witout major compensatory pattern L knee for at least 20 feet      Long Term Goals: 6 months    Pt will be compliant with % of prescribed amount.   Patient will improve their FOTO limitation score to at least 17% as evidence of clinically significant improvements in their function.  The pt to demo pain free and uncompensated walking mechanics x10 min on an indoor treadmill  The pt to demo tolerance to a squat at or bellow parallel with uncompensated mechanics x10   The pt to demo strength of L LE within 10% of R LE as demo'd on the biodex machine   The pt will report full participation in ADLs and IADLs without restrictions related to L knee.    PLAN     Progress per POC    Jessica Kevin, PT

## 2023-06-20 ENCOUNTER — CLINICAL SUPPORT (OUTPATIENT)
Dept: REHABILITATION | Facility: HOSPITAL | Age: 28
End: 2023-06-20
Payer: MEDICAID

## 2023-06-20 DIAGNOSIS — R29.898 DECREASED STRENGTH OF LOWER EXTREMITY: ICD-10-CM

## 2023-06-20 DIAGNOSIS — R26.9 GAIT ABNORMALITY: Primary | ICD-10-CM

## 2023-06-20 DIAGNOSIS — M25.662 DECREASED RANGE OF MOTION OF LEFT KNEE: ICD-10-CM

## 2023-06-20 PROCEDURE — 97110 THERAPEUTIC EXERCISES: CPT

## 2023-06-20 NOTE — PROGRESS NOTES
[facility]   Physical Therapy Treatment Note     Name: Trinity Peres  [facility] Number: [id number]    Therapy Diagnosis:   No diagnosis found.    Physician: Fabiola Pool, *    Visit Date: 6/20/2023    Physician: Fabiola oPol, *     Physician Orders: PT Eval and Treat  Medical Diagnosis from Referral:   Diagnosis   S83.512D (ICD-10-CM) - Rupture of anterior cruciate ligament of left knee, subsequent encounter   S83.242D (ICD-10-CM) - Acute medial meniscus tear, left, subsequent encounter      Evaluation Date: 6/13/2023  Authorization Period Expiration: 09/12/2023  Plan of Care Expiration: 12/31/2023  Progress Note Due: 7/11/2023  Visit # / Visits authorized: 2/8 (+ eval)  FOTO #1 given at eval     Time In: 1007 am - pt late  Time Out: 1100 am  Total Appointment Time (timed & untimed codes): 53 minutes - medicaid     DOS: 6/12/23  S/p: L ACLr  PROCEDURE:    Arthroscopic assisted ACL reconstruction using bone patellar tendon bone autograft  Bone grafting left tibia and patella  Post-Op Precautions: NWB 2 weeks, Restrict ROM 0-90 deg; WBAT at the 2 week francisca     Precautions: Standard, Weightbearing, and post op ACLr      SUBJECTIVE     Pt reports: her knee has been feeling good and she was able to complete all exercises since last visit. She reports she stopped taking her pain medication because she does have pain and the medication made her constipated. Notes her incisions have stopped bleeding.  Response to previous treatment: improved quad activation  Functional change: no functional change    Pain: 3/10  Location: left knee      OBJECTIVE     1 week 2 days s/p     Objective Measures updated at progress report unless specified.     Knee Passive Range of Motion:    Right  Left    Flexion 141 69   Extension Hyper 8 Hyper 8       Treatment       Trinity received the treatments listed below:      THERAPEUTIC EXERCISES to develop strength, endurance, ROM, flexibility, posture,  and core stabilization for 00 minutes including:    MANUAL THERAPY TECHNIQUES including Joint mobilizations and Soft tissue Mobilization were applied to left knee for 13 minutes.  Fat pad mobilization to improve TKE  Patellar mobilization to improve flex/ext  Seat EOT passive flexion       NEUROMUSCULAR RE-EDUCATION ACTIVITIES to improve Balance, Coordination, Kinesthetic, Sense, Proprioception, and Posture for 40 minutes.  The following were included:   NMES:  - Quad Set  - Quad Set AA into hyperextension  - SAQ with 4 fold towel + AA strap 5 min  - Weight shift 25% BW w/ scale 10x      THERAPEUTIC ACTIVITIES to improve dynamic and functional performance for 00 minutes including.    GAIT TRAINING to improve functional mobility and safety for 00 minutes.       Patient Education and Home Exercises     Home Exercises Provided and Patient Education Provided     Education provided:   - prognosis  - diagnosis  - continued HEP  - s/p precautions: DVT, infection, bandaging    Written Home Exercises Provided: yes. Exercises were reviewed and Trinity was able to demonstrate them prior to the end of the session.  Trinity demonstrated good  understanding of the education provided. See EMR under Patient Instructions for exercises provided during therapy sessions    ASSESSMENT     Trinity continues to diligently complete her HEP. She presented with a stiff fat pad but was able to improve within session with manual therapy. She continues to co-contract her hamstring when trying activate her quad. Demonstrated flexion to 90 degrees. Educated to continue with HEP and adhere to restrictions until FU with MD.    Trinity Is progressing well towards her goals.   Pt prognosis is Good.     Pt will continue to benefit from skilled outpatient physical therapy to address the deficits listed in the problem list box on initial evaluation, provide pt/family education and to maximize pt's level of independence in the home and community  environment.     Pt's spiritual, cultural and educational needs considered and pt agreeable to plan of care and goals.     Anticipated barriers to physical therapy: none    Goals:  Short Term Goals: 8-10 weeks  1. Pt will be compliant with HEP 50% of prescribed amount.  2. The pt to demo improvement in L knee ROM to equal R knee ROM pain free   3.  The pt to demo good quad set with proper hyperextension moment of the L knee   4. The pt to demo ambualting with elast restricitve AD witout major compensatory pattern L knee for at least 20 feet      Long Term Goals: 6 months    Pt will be compliant with % of prescribed amount.   Patient will improve their FOTO limitation score to at least 17% as evidence of clinically significant improvements in their function.  The pt to demo pain free and uncompensated walking mechanics x10 min on an indoor treadmill  The pt to demo tolerance to a squat at or bellow parallel with uncompensated mechanics x10   The pt to demo strength of L LE within 10% of R LE as demo'd on the biodex machine   The pt will report full participation in ADLs and IADLs without restrictions related to L knee.    PLAN     Progress per POC    Co-treated with Sepideh Yang, SPT    I certify that I was present in the room directing the student in service delivery and guiding them using my skilled judgment. As the co-signing therapist I have reviewed the students documentation and am responsible for the treatment, assessment, and plan.     Jessica Kevin, PT,DPT

## 2023-06-22 ENCOUNTER — CLINICAL SUPPORT (OUTPATIENT)
Dept: REHABILITATION | Facility: HOSPITAL | Age: 28
End: 2023-06-22
Payer: MEDICAID

## 2023-06-22 DIAGNOSIS — M25.662 DECREASED RANGE OF MOTION OF LEFT KNEE: ICD-10-CM

## 2023-06-22 DIAGNOSIS — R29.898 DECREASED STRENGTH OF LOWER EXTREMITY: ICD-10-CM

## 2023-06-22 DIAGNOSIS — R26.9 GAIT ABNORMALITY: Primary | ICD-10-CM

## 2023-06-22 PROCEDURE — 97110 THERAPEUTIC EXERCISES: CPT

## 2023-06-22 NOTE — PROGRESS NOTES
[facility]   Physical Therapy Treatment Note     Name: Trinity Peres  [facility] Number: [id number]    Therapy Diagnosis:   Encounter Diagnoses   Name Primary?    Gait abnormality Yes    Decreased range of motion of left knee     Decreased strength of lower extremity        Physician: Fabiola Pool, *    Visit Date: 6/22/2023    Physician: Fabiola Pool, *     Physician Orders: PT Eval and Treat  Medical Diagnosis from Referral:   Diagnosis   S83.512D (ICD-10-CM) - Rupture of anterior cruciate ligament of left knee, subsequent encounter   S83.242D (ICD-10-CM) - Acute medial meniscus tear, left, subsequent encounter      Evaluation Date: 6/13/2023  Authorization Period Expiration: 09/12/2023  Plan of Care Expiration: 12/31/2023  Progress Note Due: 7/11/2023  Visit # / Visits authorized: 3/8 (+ eval)  FOTO #1 given at eval     Time In: 0318 pm - pt late  Time Out: 0400 pm  Total Billable Time: 42 minutes - medicaid     DOS: 6/12/23  S/p: L ACLr  PROCEDURE:    Arthroscopic assisted ACL reconstruction using bone patellar tendon bone autograft  Bone grafting left tibia and patella  Post-Op Precautions: NWB 2 weeks, Restrict ROM 0-90 deg; WBAT at the 2 week francisca     Precautions: Standard, Weightbearing, and post op ACLr      SUBJECTIVE     Pt reports: she feels good and has been working on her exercises  Response to previous treatment: improved quad activation  Functional change: no functional change    Pain: 3/10  Location: left knee      OBJECTIVE     1 week 4 days s/p     Objective Measures updated at progress report unless specified.     Knee Passive Range of Motion:    Right  Left    Flexion 141 90   Extension Hyper 8 Hyper 8       Treatment       Trinity received the treatments listed below:      THERAPEUTIC EXERCISES to develop strength, endurance, ROM, flexibility, posture, and core stabilization for 12 minutes including:  Heel prop; 3# above and below; 6 min   Knee flexion  EOB w/ contra LE assist; 5 min     MANUAL THERAPY TECHNIQUES including Joint mobilizations and Soft tissue Mobilization were applied to left knee for 05 minutes.  Fat pad mobilization to improve TKE  Patellar mobilization to improve flex/ext  Seat EOT passive flexion       NEUROMUSCULAR RE-EDUCATION ACTIVITIES to improve Balance, Coordination, Kinesthetic, Sense, Proprioception, and Posture for 25 minutes.  The following were included:   NMES:  - Quad Set  - Quad Set AA into hyperextension  - SAQ 1/2 foam roll  - SLR at EOB      THERAPEUTIC ACTIVITIES to improve dynamic and functional performance for 00 minutes including.    GAIT TRAINING to improve functional mobility and safety for 00 minutes.       Patient Education and Home Exercises     Home Exercises Provided and Patient Education Provided     Education provided:   - prognosis  - diagnosis  - continued HEP  - s/p precautions: DVT, infection, bandaging    Written Home Exercises Provided: yes. Exercises were reviewed and Trinity was able to demonstrate them prior to the end of the session.  Trinity demonstrated good  understanding of the education provided. See EMR under Patient Instructions for exercises provided during therapy sessions    ASSESSMENT     Trinity presented lacking terminal knee extension which was improved following heel prop. She improved in her quad activation and was able to perform SLR at EOB with NMES with good form. She will benefit continued progression as able.     Trinity Is progressing well towards her goals.   Pt prognosis is Good.     Pt will continue to benefit from skilled outpatient physical therapy to address the deficits listed in the problem list box on initial evaluation, provide pt/family education and to maximize pt's level of independence in the home and community environment.     Pt's spiritual, cultural and educational needs considered and pt agreeable to plan of care and goals.     Anticipated barriers to physical  therapy: none    Goals:  Short Term Goals: 8-10 weeks (Progressing, not met)  1. Pt will be compliant with HEP 50% of prescribed amount.  2. The pt to demo improvement in L knee ROM to equal R knee ROM pain free   3.  The pt to demo good quad set with proper hyperextension moment of the L knee   4. The pt to demo ambualting with elast restricitve AD witout major compensatory pattern L knee for at least 20 feet      Long Term Goals: 6 months  (Progressing, not met)   Pt will be compliant with % of prescribed amount.   Patient will improve their FOTO limitation score to at least 17% as evidence of clinically significant improvements in their function.  The pt to demo pain free and uncompensated walking mechanics x10 min on an indoor treadmill  The pt to demo tolerance to a squat at or bellow parallel with uncompensated mechanics x10   The pt to demo strength of L LE within 10% of R LE as demo'd on the biodex machine   The pt will report full participation in ADLs and IADLs without restrictions related to L knee.    PLAN     Progress per POC    Co-treated with Sepideh Yang, SPT    I certify that I was present in the room directing the student in service delivery and guiding them using my skilled judgment. As the co-signing therapist I have reviewed the students documentation and am responsible for the treatment, assessment, and plan.     Jessica Kevin, PT,DPT

## 2023-06-23 ENCOUNTER — OFFICE VISIT (OUTPATIENT)
Dept: ORTHOPEDICS | Facility: CLINIC | Age: 28
End: 2023-06-23
Payer: MEDICAID

## 2023-06-23 VITALS
OXYGEN SATURATION: 96 % | DIASTOLIC BLOOD PRESSURE: 89 MMHG | HEIGHT: 65 IN | HEART RATE: 85 BPM | SYSTOLIC BLOOD PRESSURE: 125 MMHG | WEIGHT: 194.88 LBS | BODY MASS INDEX: 32.47 KG/M2

## 2023-06-23 DIAGNOSIS — Z98.890 S/P ACL RECONSTRUCTION: Primary | ICD-10-CM

## 2023-06-23 DIAGNOSIS — M25.562 ACUTE PAIN OF LEFT KNEE: Primary | ICD-10-CM

## 2023-06-23 PROCEDURE — 99999 PR PBB SHADOW E&M-EST. PATIENT-LVL III: ICD-10-PCS | Mod: PBBFAC,,,

## 2023-06-23 PROCEDURE — 99024 PR POST-OP FOLLOW-UP VISIT: ICD-10-PCS | Mod: ,,,

## 2023-06-23 PROCEDURE — 3074F PR MOST RECENT SYSTOLIC BLOOD PRESSURE < 130 MM HG: ICD-10-PCS | Mod: CPTII,,,

## 2023-06-23 PROCEDURE — 1159F MED LIST DOCD IN RCRD: CPT | Mod: CPTII,,,

## 2023-06-23 PROCEDURE — 3079F PR MOST RECENT DIASTOLIC BLOOD PRESSURE 80-89 MM HG: ICD-10-PCS | Mod: CPTII,,,

## 2023-06-23 PROCEDURE — 99024 POSTOP FOLLOW-UP VISIT: CPT | Mod: ,,,

## 2023-06-23 PROCEDURE — 3008F BODY MASS INDEX DOCD: CPT | Mod: CPTII,,,

## 2023-06-23 PROCEDURE — 99999 PR PBB SHADOW E&M-EST. PATIENT-LVL III: CPT | Mod: PBBFAC,,,

## 2023-06-23 PROCEDURE — 3074F SYST BP LT 130 MM HG: CPT | Mod: CPTII,,,

## 2023-06-23 PROCEDURE — 3079F DIAST BP 80-89 MM HG: CPT | Mod: CPTII,,,

## 2023-06-23 PROCEDURE — 3008F PR BODY MASS INDEX (BMI) DOCUMENTED: ICD-10-PCS | Mod: CPTII,,,

## 2023-06-23 PROCEDURE — 1159F PR MEDICATION LIST DOCUMENTED IN MEDICAL RECORD: ICD-10-PCS | Mod: CPTII,,,

## 2023-06-23 PROCEDURE — 99213 OFFICE O/P EST LOW 20 MIN: CPT | Mod: PBBFAC,PN

## 2023-06-23 NOTE — PROGRESS NOTES
Post-op Note    HPI    Trinity Peres is here 2 weeks s/p the following procedure:     6/12/2023     Arthroscopic assisted ACL reconstruction using bone patellar tendon bone autograft  Bone grafting left tibia and patella    Overall doing well. Pain controlled on current regimen. She is currently enrolled in Physical Therapy. Denies any chest pain or shortness of breathe. Denies any drainage from the incision. Denies any fevers, chills or paresthesias.     DVT Prophylaxis: ASA bid x 2 weeks      Physical Exam:     Patient is alert and oriented no acute distress.   Assistive Device: tscope brace, crutches    Left knee: sutures removed Incision(s) are well healed.  There is no evidence of dehiscence.  There is no induration erythema or signs of infection.  Appropriate soft tissue swelling.  Compartments are soft and compressible.  Warm well-perfused extremity. ROM 0-85    Imaging:  Left knee xrays show button and screws in good alignment.     Assessment    Trinity Peres is 2 weeks Post-op     Plan:    Overall doing as expected.  We discussed expectations of surgery and postoperative course.     Pain: Continued postoperative pain regimen -- Percocet prn/Ibuprofen  DVT prophylaxis: cont ASA until completed  PT/OT: Continue/Initiate physical therapy (weight bearing status: TTWB for another week, then may increase to 50/50 at week 4, then 100 from week 5-6), ROM 0-90 until 4 weeks post op, then advance to 120     Follow-up: 4 weeks   X-rays next visit: none

## 2023-06-27 ENCOUNTER — CLINICAL SUPPORT (OUTPATIENT)
Dept: REHABILITATION | Facility: HOSPITAL | Age: 28
End: 2023-06-27
Payer: MEDICAID

## 2023-06-27 DIAGNOSIS — R29.898 DECREASED STRENGTH OF LOWER EXTREMITY: ICD-10-CM

## 2023-06-27 DIAGNOSIS — M25.662 DECREASED RANGE OF MOTION OF LEFT KNEE: ICD-10-CM

## 2023-06-27 DIAGNOSIS — R26.9 GAIT ABNORMALITY: Primary | ICD-10-CM

## 2023-06-27 PROCEDURE — 97110 THERAPEUTIC EXERCISES: CPT

## 2023-06-27 NOTE — PROGRESS NOTES
[facility]   Physical Therapy Treatment Note     Name: Trinity Peres  [facility] Number: [id number]    Therapy Diagnosis:   Encounter Diagnoses   Name Primary?    Gait abnormality Yes    Decreased range of motion of left knee     Decreased strength of lower extremity      Physician: Fabiola Pool, *    Visit Date: 6/27/2023    Physician: Fabiola Pool, *     Physician Orders: PT Eval and Treat  Medical Diagnosis from Referral:   Diagnosis   S83.512D (ICD-10-CM) - Rupture of anterior cruciate ligament of left knee, subsequent encounter   S83.242D (ICD-10-CM) - Acute medial meniscus tear, left, subsequent encounter      Evaluation Date: 6/13/2023  Authorization Period Expiration: 09/12/2023  Plan of Care Expiration: 12/31/2023  Progress Note Due: 7/11/2023  Visit # / Visits authorized: 4/8 (+ eval)  FOTO #1 given at eval     Time In: 1006 am - pt late  Time Out: 1100 am  Total Billable Time: 54 minutes - medicaid     DOS: 6/12/23  S/p: L ACLr  PROCEDURE:    Arthroscopic assisted ACL reconstruction using bone patellar tendon bone autograft  Bone grafting left tibia and patella  Post-Op Precautions: TTWB for another week, then may increase to 50/50 at week 4, then 100 from week 5-6), ROM 0-90 until 4 weeks post op, then advance to 120     Precautions: Standard, Weightbearing, and post op ACLr      SUBJECTIVE     Pt reports: she feels good, continues working on her exercises at home.   Response to previous treatment: improved quad activation  Functional change: no functional change    Pain: 3/10  Location: left knee      OBJECTIVE     2 week 2 days s/p     Objective Measures updated at progress report unless specified.     Knee Passive Range of Motion:    Right  Left    Flexion 141 90   Extension Hyper 8 Hyper 8       Treatment       Trinity received the treatments listed below:      THERAPEUTIC EXERCISES to develop strength, endurance, ROM, flexibility, posture, and core  stabilization for 00 minutes including:     MANUAL THERAPY TECHNIQUES including Joint mobilizations and Soft tissue Mobilization were applied to left knee for 14 minutes.  Fat pad mobilization to improve TKE  Patellar mobilization to improve flex/ext  Seat EOT passive flexion 5 min      NEUROMUSCULAR RE-EDUCATION ACTIVITIES to improve Balance, Coordination, Kinesthetic, Sense, Proprioception, and Posture for 40 minutes.  The following were included:   NMES:  - Quad Set  - Quad Set AA into hyperextension  - SAQ 1/2 foam roll  - Weight shift on scale with crutches 50% BW 2 x 10      THERAPEUTIC ACTIVITIES to improve dynamic and functional performance for 00 minutes including.    GAIT TRAINING to improve functional mobility and safety for 00 minutes.       Patient Education and Home Exercises     Home Exercises Provided and Patient Education Provided     Education provided:   - prognosis  - diagnosis  - continued HEP  - s/p precautions: DVT, infection, bandaging    Written Home Exercises Provided: yes. Exercises were reviewed and Trinity was able to demonstrate them prior to the end of the session.  Trinity demonstrated good  understanding of the education provided. See EMR under Patient Instructions for exercises provided during therapy sessions    ASSESSMENT     Trinity presented with a stiff fat pad and lacking passive TKE. She responded well to manual therapy and active passive TKE within session. She tolerated NMES well with no c/o. She understands all post-op precautions and restrictions. Treatment will continue to target TKE, quad activation, and flexion to normalize gait.     Trinity Is progressing well towards her goals.   Pt prognosis is Good.     Pt will continue to benefit from skilled outpatient physical therapy to address the deficits listed in the problem list box on initial evaluation, provide pt/family education and to maximize pt's level of independence in the home and community environment.     Pt's  spiritual, cultural and educational needs considered and pt agreeable to plan of care and goals.     Anticipated barriers to physical therapy: none    Goals:  Short Term Goals: 8-10 weeks (Progressing, not met)  1. Pt will be compliant with HEP 50% of prescribed amount.  2. The pt to demo improvement in L knee ROM to equal R knee ROM pain free   3.  The pt to demo good quad set with proper hyperextension moment of the L knee   4. The pt to demo ambualting with elast restricitve AD witout major compensatory pattern L knee for at least 20 feet      Long Term Goals: 6 months  (Progressing, not met)   Pt will be compliant with % of prescribed amount.   Patient will improve their FOTO limitation score to at least 17% as evidence of clinically significant improvements in their function.  The pt to demo pain free and uncompensated walking mechanics x10 min on an indoor treadmill  The pt to demo tolerance to a squat at or bellow parallel with uncompensated mechanics x10   The pt to demo strength of L LE within 10% of R LE as demo'd on the biodex machine   The pt will report full participation in ADLs and IADLs without restrictions related to L knee.    PLAN     Progress per POC    Co-treated with Sepideh Yang, SPT    I certify that I was present in the room directing the student in service delivery and guiding them using my skilled judgment. As the co-signing therapist I have reviewed the students documentation and am responsible for the treatment, assessment, and plan.     Jessica Kevin, PT, DPT

## 2023-06-29 ENCOUNTER — CLINICAL SUPPORT (OUTPATIENT)
Dept: REHABILITATION | Facility: HOSPITAL | Age: 28
End: 2023-06-29
Payer: MEDICAID

## 2023-06-29 DIAGNOSIS — R26.9 GAIT ABNORMALITY: Primary | ICD-10-CM

## 2023-06-29 DIAGNOSIS — M25.662 DECREASED RANGE OF MOTION OF LEFT KNEE: ICD-10-CM

## 2023-06-29 DIAGNOSIS — R29.898 DECREASED STRENGTH OF LOWER EXTREMITY: ICD-10-CM

## 2023-06-29 PROCEDURE — 97110 THERAPEUTIC EXERCISES: CPT

## 2023-07-03 ENCOUNTER — CLINICAL SUPPORT (OUTPATIENT)
Dept: REHABILITATION | Facility: HOSPITAL | Age: 28
End: 2023-07-03
Payer: MEDICAID

## 2023-07-03 DIAGNOSIS — R26.9 GAIT ABNORMALITY: Primary | ICD-10-CM

## 2023-07-03 DIAGNOSIS — M25.662 DECREASED RANGE OF MOTION OF LEFT KNEE: ICD-10-CM

## 2023-07-03 DIAGNOSIS — R29.898 DECREASED STRENGTH OF LOWER EXTREMITY: ICD-10-CM

## 2023-07-03 PROCEDURE — 97110 THERAPEUTIC EXERCISES: CPT

## 2023-07-03 NOTE — PROGRESS NOTES
[facility]   Physical Therapy Treatment Note     Name: Trinity Peres  [facility] Number: [id number]    Therapy Diagnosis:   Encounter Diagnoses   Name Primary?    Gait abnormality Yes    Decreased range of motion of left knee     Decreased strength of lower extremity      Physician: Fabiola Pool, *    Visit Date: 7/3/2023    Physician: Fabiola Pool, *     Physician Orders: PT Eval and Treat  Medical Diagnosis from Referral:   Diagnosis   S83.512D (ICD-10-CM) - Rupture of anterior cruciate ligament of left knee, subsequent encounter   S83.242D (ICD-10-CM) - Acute medial meniscus tear, left, subsequent encounter      Evaluation Date: 6/13/2023  Authorization Period Expiration: 09/12/2023  Plan of Care Expiration: 12/31/2023  Progress Note Due: 7/11/2023   Visit # / Visits authorized: 6/8 (+ eval)  FOTO #1 given at eval     Time In: 1000 am   Time Out: 1100 am  Total Billable Time: 60 minutes - medicaid     DOS: 6/12/23  S/p: L ACLr  PROCEDURE:    Arthroscopic assisted ACL reconstruction using bone patellar tendon bone autograft  Bone grafting left tibia and patella  Post-Op Precautions: TTWB for another week, then may increase to 50/50 at week 4, then 100 from week 5-6), ROM 0-90 until 4 weeks post op, then advance to 120     Precautions: Standard, Weightbearing, and post op ACLr      SUBJECTIVE     Pt reports: the front of her knee feels a little tight today   Response to previous treatment: improved quad activation  Functional change: no functional change    Pain: 3/10  Location: left knee      OBJECTIVE     3 week 1 days s/p     Objective Measures updated at progress report unless specified.     Gait: Bilateral crutches, TTWB; step through pattern    Knee Passive Range of Motion:    Right  Left    Flexion 141 90   Extension Hyper 8 Hyper 8        Quad Set: good quality following NMES, partial hyperextension achieved        Joint Mobility: hypomobile patella in all  "directions and hypomobile infrapatellar fat pad       Treatment       Trinity received the treatments listed below:      THERAPEUTIC EXERCISES to develop strength, endurance, ROM, flexibility, posture, and core stabilization for 15 minutes including:   Heel prop; 4# above and below; 7 min   Heel slides; 4 min     MANUAL THERAPY TECHNIQUES including Joint mobilizations and Soft tissue Mobilization were applied to left knee for 15 minutes.  Fat pad mobilization to improve TKE  Patellar mobilization to improve flex/ext  Seat EOT passive flexion 5 min      NEUROMUSCULAR RE-EDUCATION ACTIVITIES to improve Balance, Coordination, Kinesthetic, Sense, Proprioception, and Posture for 30 minutes.  The following were included:   NMES 10" on/10" off NMES- 15 min plus set up  - Quad Set  - Quad Set AA into hyperextension  - SAQ 1/2 foam roll  - Standing EOT SLR 4 x 10    THERAPEUTIC ACTIVITIES to improve dynamic and functional performance for 00 minutes including.    GAIT TRAINING to improve functional mobility and safety for 00 minutes.       Patient Education and Home Exercises     Home Exercises Provided and Patient Education Provided     Education provided:   - prognosis  - diagnosis  - continued HEP  - s/p precautions: DVT, infection, bandaging    Written Home Exercises Provided: yes. Exercises were reviewed and Trinity was able to demonstrate them prior to the end of the session.  Trinity demonstrated good  understanding of the education provided. See EMR under Patient Instructions for exercises provided during therapy sessions    ASSESSMENT   See updated POC    Trinity Is progressing well towards her goals.   Pt prognosis is Good.     Pt will continue to benefit from skilled outpatient physical therapy to address the deficits listed in the problem list box on initial evaluation, provide pt/family education and to maximize pt's level of independence in the home and community environment.     Pt's spiritual, cultural and " educational needs considered and pt agreeable to plan of care and goals.     Anticipated barriers to physical therapy: none    Goals:  Short Term Goals: 8-10 weeks (Progressing, not met)  1. Pt will be compliant with HEP 50% of prescribed amount.  2. The pt to demo improvement in L knee ROM to equal R knee ROM pain free   3.  The pt to demo good quad set with proper hyperextension moment of the L knee   4. The pt to demo ambualting with elast restricitve AD witout major compensatory pattern L knee for at least 20 feet      Long Term Goals: 6 months  (Progressing, not met)   Pt will be compliant with % of prescribed amount.   Patient will improve their FOTO limitation score to at least 17% as evidence of clinically significant improvements in their function.  The pt to demo pain free and uncompensated walking mechanics x10 min on an indoor treadmill  The pt to demo tolerance to a squat at or bellow parallel with uncompensated mechanics x10   The pt to demo strength of L LE within 10% of R LE as demo'd on the biodex machine   The pt will report full participation in ADLs and IADLs without restrictions related to L knee.    PLAN     Progress per POC    Jessica Kevin, PT, DPT

## 2023-07-03 NOTE — PLAN OF CARE
Outpatient Therapy Updated Plan of Care     Visit Date: 7/3/2023  Name: Trinity Peres  Clinic Number: [id number]    Therapy Diagnosis:   Encounter Diagnoses   Name Primary?    Gait abnormality Yes    Decreased range of motion of left knee     Decreased strength of lower extremity      Physician: Fabiola Pool, *  Physician Orders: PT Eval and Treat  Medical Diagnosis from Referral:   Diagnosis   S83.512D (ICD-10-CM) - Rupture of anterior cruciate ligament of left knee, subsequent encounter   S83.242D (ICD-10-CM) - Acute medial meniscus tear, left, subsequent encounter      Evaluation Date: 6/13/2023  Authorization Period Expiration: 09/12/2023  Plan of Care Expiration: 12/31/2023  Progress Note Due: 7/11/2023   Visit # / Visits authorized: 6/8 (+ eval)  FOTO #1 given at eval     Time In: 1000 am   Time Out: 1100 am  Total Billable Time: 60 minutes - medicaid     DOS: 6/12/23  S/p: L ACLr  PROCEDURE:    Arthroscopic assisted ACL reconstruction using bone patellar tendon bone autograft  Bone grafting left tibia and patella  Post-Op Precautions: TTWB for another week, then may increase to 50/50 at week 4, then 100 from week 5-6), ROM 0-90 until 4 weeks post op, then advance to 120     Precautions: Standard, Weightbearing, and post op ACLr    Subjective     Update: see daily treatment note    Objective     Update:   3 week 1 days s/p     Objective Measures updated at progress report unless specified.     Gait: Bilateral crutches, TTWB; step through pattern    Knee Passive Range of Motion:    Right  Left    Flexion 141 90   Extension Hyper 8 Hyper 8        Quad Set: good quality following NMES, partial hyperextension achieved        Joint Mobility: hypomobile patella in all directions and hypomobile infrapatellar fat pad       Assessment     Update: Trinity presented lacking terminal knee extension. This improved following manual and LLLD stretching. She is improving in her active hyperextension but is  currently unable to perform this without NMES. She will benefit continued progressive range of motion and quad strengthening to improve functional mobility.     Short Term Goals: 8-10 weeks (Progressing, not met)  1. Pt will be compliant with HEP 50% of prescribed amount.  2. The pt to demo improvement in L knee ROM to equal R knee ROM pain free   3.  The pt to demo good quad set with proper hyperextension moment of the L knee   4. The pt to demo ambualting with elast restricitve AD witout major compensatory pattern L knee for at least 20 feet      Long Term Goals: 6 months  (Progressing, not met)   Pt will be compliant with % of prescribed amount.   Patient will improve their FOTO limitation score to at least 17% as evidence of clinically significant improvements in their function.  The pt to demo pain free and uncompensated walking mechanics x10 min on an indoor treadmill  The pt to demo tolerance to a squat at or bellow parallel with uncompensated mechanics x10   The pt to demo strength of L LE within 10% of R LE as demo'd on the biodex machine   The pt will report full participation in ADLs and IADLs without restrictions related to L knee.  Reasons for Recertification of Therapy:  reauthorization of visits/updated POC    Plan     Updated Certification Period: 7/3/2023 to 12/31/2023  Recommended Treatment Plan: 2 times per week for 26 weeks: Electrical Stimulation NMES, Gait Training, Manual Therapy, Moist Heat/ Ice, Neuromuscular Re-ed, Patient Education, Therapeutic Activities, and Therapeutic Exercise  Other Recommendations: none    Jessica Kevin, PT, DPT  7/3/2023      I CERTIFY THE NEED FOR THESE SERVICES FURNISHED UNDER THIS PLAN OF TREATMENT AND WHILE UNDER MY CARE    Physician's comments:        Physician's Signature: ___________________________________________________

## 2023-07-06 ENCOUNTER — CLINICAL SUPPORT (OUTPATIENT)
Dept: REHABILITATION | Facility: HOSPITAL | Age: 28
End: 2023-07-06
Payer: MEDICAID

## 2023-07-06 DIAGNOSIS — R29.898 DECREASED STRENGTH OF LOWER EXTREMITY: ICD-10-CM

## 2023-07-06 DIAGNOSIS — R26.9 GAIT ABNORMALITY: Primary | ICD-10-CM

## 2023-07-06 DIAGNOSIS — M25.662 DECREASED RANGE OF MOTION OF LEFT KNEE: ICD-10-CM

## 2023-07-06 PROCEDURE — 97112 NEUROMUSCULAR REEDUCATION: CPT

## 2023-07-06 PROCEDURE — 97140 MANUAL THERAPY 1/> REGIONS: CPT

## 2023-07-06 NOTE — PROGRESS NOTES
[facility]   Physical Therapy Treatment Note     Name: Trinity Peres  [facility] Number: [id number]    Therapy Diagnosis:   Encounter Diagnoses   Name Primary?    Gait abnormality Yes    Decreased range of motion of left knee     Decreased strength of lower extremity      Physician: Fabiola Pool, *    Visit Date: 7/6/2023    Physician: Fabiola Pool, *     Physician Orders: PT Eval and Treat  Medical Diagnosis from Referral:   Diagnosis   S83.512D (ICD-10-CM) - Rupture of anterior cruciate ligament of left knee, subsequent encounter   S83.242D (ICD-10-CM) - Acute medial meniscus tear, left, subsequent encounter      Evaluation Date: 6/13/2023  Authorization Period Expiration: 09/12/2023  Plan of Care Expiration: 12/31/2023  Progress Note Due: 7/11/2023   Visit # / Visits authorized: 7/8 (+ eval)  FOTO #1 given at eval     Time In: 1000 am   Time Out: 1100 am  Total Billable Time: 60 minutes - medicaid     DOS: 6/12/23  S/p: L ACLr  PROCEDURE:    Arthroscopic assisted ACL reconstruction using bone patellar tendon bone autograft  Bone grafting left tibia and patella  Post-Op Precautions: TTWB for another week, then may increase to 50/50 at week 4, then 100 from week 5-6), ROM 0-90 until 4 weeks post op, then advance to 120     Precautions: Standard, Weightbearing, and post op ACLr      SUBJECTIVE     Pt reports: her knee feels good and she has been completing her HEP with no issues  Response to previous treatment: improved quad activation  Functional change: no functional change    Pain: 3/10  Location: left knee      OBJECTIVE     3 week 4 days s/p     Objective Measures updated at progress report unless specified.     Gait: Bilateral crutches, TTWB; step through pattern    Knee Passive Range of Motion:    Right  Left    Flexion 141 90   Extension Hyper 8 Hyper 8        Quad Set: good quality following NMES, partial hyperextension achieved        Joint Mobility: hypomobile  "patella in all directions and hypomobile infrapatellar fat pad       Treatment       Trinity received the treatments listed below:      THERAPEUTIC EXERCISES to develop strength, endurance, ROM, flexibility, posture, and core stabilization for 07 minutes including:   Heel prop; 4# above and below; 7 min     MANUAL THERAPY TECHNIQUES including Joint mobilizations and Soft tissue Mobilization were applied to left knee for 13 minutes.  Fat pad mobilization to improve TKE  Patellar mobilization to improve flex/ext  Seat EOT passive flexion 5 min      NEUROMUSCULAR RE-EDUCATION ACTIVITIES to improve Balance, Coordination, Kinesthetic, Sense, Proprioception, and Posture for 40 minutes.  The following were included:   NMES 10" on/10" off NMES- 15 min plus set up  - Quad Set  - Quad Set AA into hyperextension  - SAQ with four fold towel  - Standing EOT SLR 4 x 10  - Standing TKE 10 x 10s  - SLR 1 x 10    THERAPEUTIC ACTIVITIES to improve dynamic and functional performance for 00 minutes including.    GAIT TRAINING to improve functional mobility and safety for 00 minutes.       Patient Education and Home Exercises     Home Exercises Provided and Patient Education Provided     Education provided:   - prognosis  - diagnosis  - continued HEP  - s/p precautions: DVT, infection, bandaging    Written Home Exercises Provided: yes. Exercises were reviewed and Trinity was able to demonstrate them prior to the end of the session.  Trinity demonstrated good  understanding of the education provided. See EMR under Patient Instructions for exercises provided during therapy sessions    ASSESSMENT   Trinity presented lacking passive and active hyperextension. She responded well to manual therapy and LLD to achieve TKE within session. She continues to struggles with quad activation without co-contraction of hamstrings. She responded well to standing TKE for active ROM. She was able to achieve a long sitting SLR with maximum verbal and " tactile cueing for the first time with no quad lag. She continues to demonstrate free and easy flexion to 90 degrees.     Trinity Is progressing well towards her goals.   Pt prognosis is Good.     Pt will continue to benefit from skilled outpatient physical therapy to address the deficits listed in the problem list box on initial evaluation, provide pt/family education and to maximize pt's level of independence in the home and community environment.     Pt's spiritual, cultural and educational needs considered and pt agreeable to plan of care and goals.     Anticipated barriers to physical therapy: none    Goals:  Short Term Goals: 8-10 weeks (Progressing, not met)  1. Pt will be compliant with HEP 50% of prescribed amount.  2. The pt to demo improvement in L knee ROM to equal R knee ROM pain free   3.  The pt to demo good quad set with proper hyperextension moment of the L knee   4. The pt to demo ambualting with elast restricitve AD witout major compensatory pattern L knee for at least 20 feet      Long Term Goals: 6 months  (Progressing, not met)   Pt will be compliant with % of prescribed amount.   Patient will improve their FOTO limitation score to at least 17% as evidence of clinically significant improvements in their function.  The pt to demo pain free and uncompensated walking mechanics x10 min on an indoor treadmill  The pt to demo tolerance to a squat at or bellow parallel with uncompensated mechanics x10   The pt to demo strength of L LE within 10% of R LE as demo'd on the biodex machine   The pt will report full participation in ADLs and IADLs without restrictions related to L knee.    PLAN     Progress per POC    Co-treated with JOSE ROBERTO Granados    I certify that I was present in the room directing the student in service delivery and guiding them using my skilled judgment. As the co-signing therapist I have reviewed the students documentation and am responsible for the treatment, assessment,  and plan.       Jessica Kevin, PT, DPT

## 2023-07-09 ENCOUNTER — HOSPITAL ENCOUNTER (EMERGENCY)
Facility: HOSPITAL | Age: 28
Discharge: HOME | End: 2023-07-09
Attending: EMERGENCY MEDICINE
Payer: MEDICAID

## 2023-07-09 VITALS
HEART RATE: 83 BPM | OXYGEN SATURATION: 100 % | WEIGHT: 190 LBS | RESPIRATION RATE: 16 BRPM | TEMPERATURE: 98 F | DIASTOLIC BLOOD PRESSURE: 81 MMHG | BODY MASS INDEX: 31.65 KG/M2 | HEIGHT: 65 IN | SYSTOLIC BLOOD PRESSURE: 118 MMHG

## 2023-07-09 DIAGNOSIS — R79.89 ELEVATED D-DIMER: ICD-10-CM

## 2023-07-09 DIAGNOSIS — Z09 POSTOP CHECK: ICD-10-CM

## 2023-07-09 DIAGNOSIS — R06.02 SOB (SHORTNESS OF BREATH): ICD-10-CM

## 2023-07-09 DIAGNOSIS — R07.9 CHEST PAIN: Primary | ICD-10-CM

## 2023-07-09 LAB
ALBUMIN SERPL BCP-MCNC: 4 G/DL (ref 3.5–5.2)
ALP SERPL-CCNC: 77 U/L (ref 55–135)
ALT SERPL W/O P-5'-P-CCNC: 11 U/L (ref 10–44)
ANION GAP SERPL CALC-SCNC: 7 MMOL/L (ref 8–16)
AST SERPL-CCNC: 13 U/L (ref 10–40)
B-HCG UR QL: NEGATIVE
BASOPHILS # BLD AUTO: 0.04 K/UL (ref 0–0.2)
BASOPHILS NFR BLD: 0.2 % (ref 0–1.9)
BILIRUB SERPL-MCNC: 0.8 MG/DL (ref 0.1–1)
BNP SERPL-MCNC: <10 PG/ML (ref 0–99)
BUN SERPL-MCNC: 8 MG/DL (ref 6–20)
CALCIUM SERPL-MCNC: 9.6 MG/DL (ref 8.7–10.5)
CHLORIDE SERPL-SCNC: 108 MMOL/L (ref 95–110)
CO2 SERPL-SCNC: 22 MMOL/L (ref 23–29)
CREAT SERPL-MCNC: 0.7 MG/DL (ref 0.5–1.4)
CTP QC/QA: YES
D DIMER PPP IA.FEU-MCNC: 2.05 MG/L FEU
DIFFERENTIAL METHOD: ABNORMAL
EOSINOPHIL # BLD AUTO: 0 K/UL (ref 0–0.5)
EOSINOPHIL NFR BLD: 0.2 % (ref 0–8)
ERYTHROCYTE [DISTWIDTH] IN BLOOD BY AUTOMATED COUNT: 13.7 % (ref 11.5–14.5)
EST. GFR  (NO RACE VARIABLE): >60 ML/MIN/1.73 M^2
GLUCOSE SERPL-MCNC: 88 MG/DL (ref 70–110)
HCT VFR BLD AUTO: 38.9 % (ref 37–48.5)
HCV AB SERPL QL IA: NORMAL
HGB BLD-MCNC: 13.9 G/DL (ref 12–16)
HIV 1+2 AB+HIV1 P24 AG SERPL QL IA: NORMAL
IMM GRANULOCYTES # BLD AUTO: 0.16 K/UL (ref 0–0.04)
IMM GRANULOCYTES NFR BLD AUTO: 0.8 % (ref 0–0.5)
LIPASE SERPL-CCNC: 13 U/L (ref 4–60)
LYMPHOCYTES # BLD AUTO: 1.8 K/UL (ref 1–4.8)
LYMPHOCYTES NFR BLD: 9.4 % (ref 18–48)
MCH RBC QN AUTO: 32 PG (ref 27–31)
MCHC RBC AUTO-ENTMCNC: 35.7 G/DL (ref 32–36)
MCV RBC AUTO: 90 FL (ref 82–98)
MONOCYTES # BLD AUTO: 1.2 K/UL (ref 0.3–1)
MONOCYTES NFR BLD: 6.3 % (ref 4–15)
NEUTROPHILS # BLD AUTO: 15.7 K/UL (ref 1.8–7.7)
NEUTROPHILS NFR BLD: 83.1 % (ref 38–73)
NRBC BLD-RTO: 0 /100 WBC
PLATELET # BLD AUTO: 357 K/UL (ref 150–450)
PLATELET BLD QL SMEAR: ABNORMAL
PMV BLD AUTO: 10.6 FL (ref 9.2–12.9)
POTASSIUM SERPL-SCNC: 3.6 MMOL/L (ref 3.5–5.1)
PROT SERPL-MCNC: 7.8 G/DL (ref 6–8.4)
RBC # BLD AUTO: 4.34 M/UL (ref 4–5.4)
SARS-COV-2 RDRP RESP QL NAA+PROBE: NEGATIVE
SODIUM SERPL-SCNC: 137 MMOL/L (ref 136–145)
TROPONIN I SERPL DL<=0.01 NG/ML-MCNC: <0.006 NG/ML (ref 0–0.03)
TROPONIN I SERPL DL<=0.01 NG/ML-MCNC: <0.006 NG/ML (ref 0–0.03)
WBC # BLD AUTO: 18.86 K/UL (ref 3.9–12.7)

## 2023-07-09 PROCEDURE — 85025 COMPLETE CBC W/AUTO DIFF WBC: CPT | Performed by: PHYSICIAN ASSISTANT

## 2023-07-09 PROCEDURE — 83880 ASSAY OF NATRIURETIC PEPTIDE: CPT | Performed by: PHYSICIAN ASSISTANT

## 2023-07-09 PROCEDURE — 93010 EKG 12-LEAD: ICD-10-PCS | Mod: ,,, | Performed by: INTERNAL MEDICINE

## 2023-07-09 PROCEDURE — 87389 HIV-1 AG W/HIV-1&-2 AB AG IA: CPT | Performed by: PHYSICIAN ASSISTANT

## 2023-07-09 PROCEDURE — 99285 EMERGENCY DEPT VISIT HI MDM: CPT | Mod: 25

## 2023-07-09 PROCEDURE — U0002 COVID-19 LAB TEST NON-CDC: HCPCS | Performed by: PHYSICIAN ASSISTANT

## 2023-07-09 PROCEDURE — 25500020 PHARM REV CODE 255: Performed by: EMERGENCY MEDICINE

## 2023-07-09 PROCEDURE — 85379 FIBRIN DEGRADATION QUANT: CPT | Performed by: PHYSICIAN ASSISTANT

## 2023-07-09 PROCEDURE — 86803 HEPATITIS C AB TEST: CPT | Performed by: PHYSICIAN ASSISTANT

## 2023-07-09 PROCEDURE — 94761 N-INVAS EAR/PLS OXIMETRY MLT: CPT

## 2023-07-09 PROCEDURE — 80053 COMPREHEN METABOLIC PANEL: CPT | Performed by: PHYSICIAN ASSISTANT

## 2023-07-09 PROCEDURE — 84484 ASSAY OF TROPONIN QUANT: CPT | Mod: 91 | Performed by: PHYSICIAN ASSISTANT

## 2023-07-09 PROCEDURE — 81025 URINE PREGNANCY TEST: CPT | Performed by: PHYSICIAN ASSISTANT

## 2023-07-09 PROCEDURE — 93010 ELECTROCARDIOGRAM REPORT: CPT | Mod: ,,, | Performed by: INTERNAL MEDICINE

## 2023-07-09 PROCEDURE — 83690 ASSAY OF LIPASE: CPT | Performed by: PHYSICIAN ASSISTANT

## 2023-07-09 PROCEDURE — 93005 ELECTROCARDIOGRAM TRACING: CPT

## 2023-07-09 RX ADMIN — IOHEXOL 75 ML: 350 INJECTION, SOLUTION INTRAVENOUS at 01:07

## 2023-07-09 NOTE — ED NOTES
Patient states midsternal CP and SOB onset this am 0200, pain worse with mvmt, surgery June 12, has been in PT after surgery June 12, arrives with brace to leg, using crutches,

## 2023-07-09 NOTE — ED NOTES
Patient identifiers verified and correct for  Ms Peres   C/C:  CP SOB SEE NN  APPEARANCE: awake and alert in NAD. PAIN  8/10  SKIN: warm, dry and intact. No breakdown or bruising.  MUSCULOSKELETAL: Patient moving all extremities spontaneously, no obvious swelling or deformities noted. Ambulates independently.  RESPIRATORY:Positive  shortness of breath.Respirations unlabored. Reports cough, deneis feveras.   CARDIAC: Positive  CP, 2+ distal pulses; no peripheral edema  ABDOMEN: S/ND/NT, Denies nausea  : voids spontaneously, denies difficulty  Neurologic: AAO x 4; follows commands equal strength in all extremities; denies numbness/tingling. Denies dizziness  denies new weakness

## 2023-07-09 NOTE — PROVIDER PROGRESS NOTES - EMERGENCY DEPT.
Encounter Date: 7/9/2023    ED Physician Progress Notes         EKG - STEMI Decision  Initial Reading: No STEMI present.

## 2023-07-09 NOTE — ED PROVIDER NOTES
Encounter Date: 7/9/2023       History     Chief Complaint   Patient presents with    Post-op Problem     Had knee surg June 12 ,now feeling sob and chest hurts     Patient is a 28-year-old female who is s/p recent left knee surgery on June 12.  She denies any previous post-operative complications.  She has had a post-op follow-up appointment.  She presents to the ER today for an emergent evaluation complaining of acute chest pain and shortness of breath.  She states that symptoms began around 2 a.m. this morning while at rest.  She states that the pain is in the middle of her chest and does not radiate to her back, neck, jaw, or extremities.  She states that the symptoms come and go.  She states that she has had a mild cough, that is nonproductive.  She denies any mitigating or exacerbating factors.  She denies any fever or chills.  She denies any increased pain, redness, or swelling to her leg.  She denies any history of previous PE/DVT.  She denies any birth control/hormone use.  She denies smoking.  She is not tried anything for symptoms.  No additional complaints or concerns reported.    Review of patient's allergies indicates:  No Known Allergies  History reviewed. No pertinent past medical history.  Past Surgical History:   Procedure Laterality Date    RECONSTRUCTION OF ANTERIOR CRUCIATE LIGAMENT USING GRAFT Left 6/12/2023    Procedure: RECONSTRUCTION, KNEE, ACL, USING GRAFT;  Surgeon: Yrn Rodriguez MD;  Location: [facility];  Service: Orthopedics;  Laterality: Left;  with autograft      History reviewed. No pertinent family history.  Social History     Tobacco Use    Smoking status: Never    Smokeless tobacco: Never   Substance Use Topics    Alcohol use: Never    Drug use: Never     Review of Systems   Constitutional:  Negative for activity change, chills, diaphoresis, fatigue and fever.   HENT:  Negative for congestion, rhinorrhea and sore throat.    Eyes:  Negative for visual disturbance.   Respiratory:   Positive for cough and shortness of breath. Negative for chest tightness and wheezing.    Cardiovascular:  Positive for chest pain. Negative for palpitations and leg swelling.   Gastrointestinal:  Negative for abdominal pain, diarrhea, nausea and vomiting.   Genitourinary:  Negative for decreased urine volume and dysuria.   Musculoskeletal:  Negative for back pain.   Skin:  Positive for wound. Negative for rash.   Allergic/Immunologic: Negative for immunocompromised state.   Neurological:  Negative for dizziness, syncope, weakness, light-headedness, numbness and headaches.   Hematological:  Negative for adenopathy.   Psychiatric/Behavioral:  Negative for confusion.      Physical Exam     Initial Vitals [07/09/23 1108]   BP Pulse Resp Temp SpO2   138/82 104 18 98.6 °F (37 °C) 99 %      MAP       --         Physical Exam    Nursing note and vitals reviewed.  Constitutional: She appears well-developed and well-nourished. She is not diaphoretic.   Alert and interactive.  No obvious distress.   HENT:   Head: Normocephalic.   Eyes: Conjunctivae are normal.   Neck: Neck supple. No JVD present.   Cardiovascular:            Mild tachycardia noted,  bpm   Pulmonary/Chest: No respiratory distress.   Not coughing or wheezing during interview.  No tachypnea or hypoxia.  Breathing through nose with mouth closed, no respiratory distress.  No conversational dyspnea noted.  No increased work of breathing.   Abdominal: She exhibits no distension. There is no abdominal tenderness.   Musculoskeletal:      Cervical back: Neck supple.      Comments: Left knee status post recent ACL repair; incision intact without evidence of infection; no calf pain or swelling.     Neurological: She is alert and oriented to person, place, and time. She has normal strength. No sensory deficit.   Skin: Skin is warm and dry.   Psychiatric: She has a normal mood and affect. Her behavior is normal.       ED Course   Procedures  Labs Reviewed   CBC W/  AUTO DIFFERENTIAL - Abnormal; Notable for the following components:       Result Value    WBC 18.86 (*)     MCH 32.0 (*)     Immature Granulocytes 0.8 (*)     Gran # (ANC) 15.7 (*)     Immature Grans (Abs) 0.16 (*)     Mono # 1.2 (*)     Gran % 83.1 (*)     Lymph % 9.4 (*)     All other components within normal limits   COMPREHENSIVE METABOLIC PANEL - Abnormal; Notable for the following components:    CO2 22 (*)     Anion Gap 7 (*)     All other components within normal limits   D DIMER, QUANTITATIVE - Abnormal; Notable for the following components:    D-Dimer 2.05 (*)     All other components within normal limits   HIV 1 / 2 ANTIBODY    Narrative:     Release to patient->Immediate   HEPATITIS C ANTIBODY    Narrative:     Release to patient->Immediate   LIPASE   TROPONIN I   B-TYPE NATRIURETIC PEPTIDE   SARS-COV-2 RNA AMPLIFICATION, QUAL   TROPONIN I   POCT URINE PREGNANCY     Results for orders placed or performed during the hospital encounter of 07/09/23   HIV 1/2 Ag/Ab (4th Gen)   Result Value Ref Range    HIV 1/2 Ag/Ab Non-reactive Non-reactive   Hepatitis C Antibody   Result Value Ref Range    Hepatitis C Ab Non-reactive Non-reactive   CBC auto differential   Result Value Ref Range    WBC 18.86 (H) 3.90 - 12.70 K/uL    RBC 4.34 4.00 - 5.40 M/uL    Hemoglobin 13.9 12.0 - 16.0 g/dL    Hematocrit 38.9 37.0 - 48.5 %    MCV 90 82 - 98 fL    MCH 32.0 (H) 27.0 - 31.0 pg    MCHC 35.7 32.0 - 36.0 g/dL    RDW 13.7 11.5 - 14.5 %    Platelets 357 150 - 450 K/uL    MPV 10.6 9.2 - 12.9 fL    Immature Granulocytes 0.8 (H) 0.0 - 0.5 %    Gran # (ANC) 15.7 (H) 1.8 - 7.7 K/uL    Immature Grans (Abs) 0.16 (H) 0.00 - 0.04 K/uL    Lymph # 1.8 1.0 - 4.8 K/uL    Mono # 1.2 (H) 0.3 - 1.0 K/uL    Eos # 0.0 0.0 - 0.5 K/uL    Baso # 0.04 0.00 - 0.20 K/uL    nRBC 0 0 /100 WBC    Gran % 83.1 (H) 38.0 - 73.0 %    Lymph % 9.4 (L) 18.0 - 48.0 %    Mono % 6.3 4.0 - 15.0 %    Eosinophil % 0.2 0.0 - 8.0 %    Basophil % 0.2 0.0 - 1.9 %     Platelet Estimate Appears normal     Differential Method Automated    Comprehensive metabolic panel   Result Value Ref Range    Sodium 137 136 - 145 mmol/L    Potassium 3.6 3.5 - 5.1 mmol/L    Chloride 108 95 - 110 mmol/L    CO2 22 (L) 23 - 29 mmol/L    Glucose 88 70 - 110 mg/dL    BUN 8 6 - 20 mg/dL    Creatinine 0.7 0.5 - 1.4 mg/dL    Calcium 9.6 8.7 - 10.5 mg/dL    Total Protein 7.8 6.0 - 8.4 g/dL    Albumin 4.0 3.5 - 5.2 g/dL    Total Bilirubin 0.8 0.1 - 1.0 mg/dL    Alkaline Phosphatase 77 55 - 135 U/L    AST 13 10 - 40 U/L    ALT 11 10 - 44 U/L    eGFR >60.0 >60 mL/min/1.73 m^2    Anion Gap 7 (L) 8 - 16 mmol/L   Lipase   Result Value Ref Range    Lipase 13 4 - 60 U/L   Troponin I   Result Value Ref Range    Troponin I <0.006 0.000 - 0.026 ng/mL   Brain natriuretic peptide   Result Value Ref Range    BNP <10 0 - 99 pg/mL   D dimer, quantitative   Result Value Ref Range    D-Dimer 2.05 (H) <0.50 mg/L FEU   COVID-19 Rapid Screening   Result Value Ref Range    SARS-CoV-2 RNA, Amplification, Qual Negative Negative   Troponin I   Result Value Ref Range    Troponin I <0.006 0.000 - 0.026 ng/mL   POCT urine pregnancy   Result Value Ref Range    POC Preg Test, Ur Negative Negative     Acceptable Yes        EKG Readings: (Independently Interpreted)   Initial Reading: No STEMI. Rhythm: Normal Sinus Rhythm. Heart Rate: 98. ST Segments: Normal ST Segments. T Waves: Normal.   Here attending physician interpreted and signed     Imaging Results              X-Ray Chest AP Portable (Final result)  Result time 07/09/23 15:20:30      Final result by Severo Parra MD (07/09/23 15:20:30)                   Impression:      1. No acute cardiopulmonary process.      Electronically signed by: Severo Parra MD  Date:    07/09/2023  Time:    15:20               Narrative:    EXAMINATION:  XR CHEST AP PORTABLE    CLINICAL HISTORY:  Chest pain, unspecified    TECHNIQUE:  Single frontal view of the chest was  performed.    COMPARISON:  05/15/2023    FINDINGS:  The cardiomediastinal silhouette is not enlarged.  There is no pleural effusion.  The trachea is midline.  The lungs are symmetrically expanded bilaterally with coarse interstitial attenuation bilaterally, accentuated by overlying habitus..  No large focal consolidation seen.  There is no pneumothorax.  The osseous structures are unremarkable.                                       CTA Chest Non-Coronary (PE Studies) (Final result)  Result time 07/09/23 14:24:20      Final result by Ryan Estrada MD (07/09/23 14:24:20)                   Impression:      Motion limited examination.  No convincing evidence of acute pulmonary embolus.  Smaller pulmonary emboli not entirely excluded, particularly in the bilateral lower lobes due to significant motion.    Questionable area of increased enhancement involving the anterior aspect of the right kidney, only partially visualized in the field of view.  This finding is most likely artifactual though follow-up renal ultrasound could be considered if clinically warranted to exclude solid lesion.    Additional incidental findings discussed in the body of the report.      Electronically signed by: Ryan Estrada MD  Date:    07/09/2023  Time:    14:24               Narrative:    EXAMINATION:  CTA CHEST NON CORONARY (PE STUDIES)    CLINICAL HISTORY:  Pulmonary embolism (PE) suspected, positive D-dimer;    TECHNIQUE:  Low dose axial images, sagittal and coronal reformations were obtained from the thoracic inlet to the lung bases following the IV administration of 75 mL of Omnipaque 350.  Contrast timing was optimized to evaluate the pulmonary arteries.  MIP images were performed.    COMPARISON:  Chest radiograph, 05/15/2023.    FINDINGS:  Examination of the soft tissue and vascular structures at the base of the neck is unremarkable.    The thoracic aorta maintains normal caliber, contour, and course without significant  atherosclerotic calcification.  There is no evidence of aneurysmal dilation or dissection.    The pulmonary arteries distribute normally.  Exam quality is limited by motion and beam hardening artifact related to contrast bolus in the SVC.  Linear hypoattenuation in the lower lobe pulmonary arteries likely related to motion.  No convincing evidence of acute pulmonary embolus to the proximal segmental level.    The trachea and proximal airways are patent.    The lungs are symmetrically expanded and detailed evaluation of the pulmonary parenchyma is limited by motion.  No consolidation or pleural effusion.    The heart is not enlarged.  No pericardial effusion.    There is no axillary, mediastinal, or hilar lymph node enlargement.    The esophagus demonstrates minimal wall prominence and possible small hiatal hernia.    Limited images of the upper abdomen obtained during the course of this dedicated thoracic CT is negative for acute findings.  Subtle region of increased enhancement in the anterior aspect of the right kidney, incompletely included in the field of view (axial image 495).  This finding is likely artifactual as enhancing mass would be rare in a patient this age.  Ultrasound follow-up could be considered if there is a specific clinical concern.  There is a small enhancing lesion in the inferior right hepatic lobe which may represent a hemangioma.  Liver appears mildly enlarged.    The osseous structures are within expected limits.                                       Medications   iohexoL (OMNIPAQUE 350) injection 75 mL (75 mLs Intravenous Given 7/9/23 1335)     Medical Decision Making:   History:   Old Medical Records: I decided to obtain old medical records.  Initial Assessment:   28-year-old female status post left knee ACL repair surgery on 6/12, presenting to the ER for an emergent evaluation due to acute intermittent substernal chest pain and shortness of breath since 2 a.m. this  morning  Differential Diagnosis:   Pulmonary embolism, DVT, ACS, dysrhythmia, pneumonia, pneumothorax, pericarditis, pleurisy, pleural effusion, costochondritis, CHF, GI, anxiety, musculoskeletal/chest wall pain, etc.  Clinical Tests:   Lab Tests: Ordered and Reviewed  Radiological Study: Ordered and Reviewed  Medical Tests: Ordered and Reviewed  ED Management:  Vital signs reviewed, slight tachycardia noted, otherwise unremarkable  Records reviewed   UPT negative   EKG completed reviewed by ER attending physician, unremarkable  Labs notable for elevated white blood cell count and elevated D-dimer  Chest x-ray completed, unremarkable  CTA chest, PE protocol completed, unremarkable  Case discussed with ER attending physician   Patient remains asymptomatic throughout ED evaluation and is eager for discharge   Patient advised to follow up with her primary care physician within the next 1-2 days for re-evaluation and further management   Patient instructed to follow up with her surgeon this week  Patient advised to return to the ER promptly if unimproved or if worse in any way  Medical complexity: High risk  Additional MDM:     EKG: I have independently interpreted EKG(s) - see notes., EKG - Independent Interpretation - Normal sinus rhythm, normal rate, no acute changes. Your attending physician reviewed and signed EKG     X-Rays: I have independently interpreted X-Ray(s) - see notes., Chest X-Ray - Independent Interpretation - Heart size normal, Lungs clear, No acute abnormality.   PERC Rule:   Age is greater than or equal to 50 = 0.0  Heart Rate is greater than or equal to 100 = 1.0  SaO2 on room air < 95% = 0.0  Unilateral leg swelling = 0.0  Hemoptysis = 0.0  Recent surgery or trauma = 1.0  Prior PE or DVT =  0.0  Hormone use = 0.00  PERC Score = 2                     Clinical Impression:   Final diagnoses:  [R06.02] SOB (shortness of breath)  [R07.9] Chest pain (Primary)  [Z09] Postop check  [R79.89] Elevated  d-dimer        ED Disposition Condition    Discharge Stable          ED Prescriptions    None       Follow-up Information       Follow up With Specialties Details Why Contact Info    Ana Maria Peter MD Family Medicine Schedule an appointment as soon as possible for a visit in 2 days Follow up with your primary care physician within the next 1-2 days for re-evaluation further management. [id number]  [facility] [address]  [phone]      [facility] - Emergency Dept Emergency Medicine  Return to the ER promptly if unimproved or if worse in any way. [address]  [facility] [id number] [phone]             Shane Loomis PA-C  07/09/23 8064

## 2023-07-11 ENCOUNTER — PATIENT MESSAGE (OUTPATIENT)
Dept: RESEARCH | Facility: HOSPITAL | Age: 28
End: 2023-07-11
Payer: MEDICAID

## 2023-07-11 ENCOUNTER — CLINICAL SUPPORT (OUTPATIENT)
Dept: REHABILITATION | Facility: HOSPITAL | Age: 28
End: 2023-07-11
Payer: MEDICAID

## 2023-07-11 DIAGNOSIS — R29.898 DECREASED STRENGTH OF LOWER EXTREMITY: ICD-10-CM

## 2023-07-11 DIAGNOSIS — M25.662 DECREASED RANGE OF MOTION OF LEFT KNEE: ICD-10-CM

## 2023-07-11 DIAGNOSIS — R26.9 GAIT ABNORMALITY: Primary | ICD-10-CM

## 2023-07-11 PROCEDURE — 97110 THERAPEUTIC EXERCISES: CPT

## 2023-07-11 NOTE — PROGRESS NOTES
[facility]   Physical Therapy Treatment Note     Name: Trinity Peres  [facility] Number: [id number]    Therapy Diagnosis:   Encounter Diagnoses   Name Primary?    Gait abnormality Yes    Decreased range of motion of left knee     Decreased strength of lower extremity      Physician: Fabiola Pool, *    Visit Date: 7/11/2023    Physician: Fabiola Pool, *     Physician Orders: PT Eval and Treat  Medical Diagnosis from Referral:   Diagnosis   S83.512D (ICD-10-CM) - Rupture of anterior cruciate ligament of left knee, subsequent encounter   S83.242D (ICD-10-CM) - Acute medial meniscus tear, left, subsequent encounter      Evaluation Date: 6/13/2023  Authorization Period Expiration: 09/12/2023  Plan of Care Expiration: 12/31/2023  Progress Note Due: 7/11/2023   Visit # / Visits authorized: 8/8 (+ eval)  FOTO #1 given at eval     Time In: 0915 am   Time Out: 1010 am  Total Billable Time: 55 minutes - medicaid     DOS: 6/12/23  S/p: L ACLr  PROCEDURE:    Arthroscopic assisted ACL reconstruction using bone patellar tendon bone autograft  Bone grafting left tibia and patella  Post-Op Precautions: TTWB for another week, then may increase to 50/50 at week 4, then 100 from week 5-6), ROM 0-90 until 4 weeks post op, then advance to 120     Precautions: Standard, Weightbearing, and post op ACLr      SUBJECTIVE     Pt reports: She has been working on her extension   Response to previous treatment: improved quad activation  Functional change: no functional change    Pain: 3/10  Location: left knee      OBJECTIVE     4 week 2 days s/p     Objective Measures updated at progress report unless specified.     Gait: Bilateral crutches, TTWB; step through pattern    Knee Passive Range of Motion:    Right  Left    Flexion 141 118   Extension Hyper 8 Hyper 8        Quad Set: good quality following NMES, partial hyperextension achieved        Joint Mobility: hypomobile patella in all directions and  "hypomobile infrapatellar fat pad       Treatment       Trinity received the treatments listed below:      THERAPEUTIC EXERCISES to develop strength, endurance, ROM, flexibility, posture, and core stabilization for 10 minutes including:   Heel prop; 4# above and below; 7 min   Hinge stretch; 20x w/ 5" hold    MANUAL THERAPY TECHNIQUES including Joint mobilizations and Soft tissue Mobilization were applied to left knee for 05 minutes.  Fat pad mobilization to improve TKE  Patellar mobilization to improve flex/ext  Seat EOT passive flexion 5 min      NEUROMUSCULAR RE-EDUCATION ACTIVITIES to improve Balance, Coordination, Kinesthetic, Sense, Proprioception, and Posture for 40 minutes.  The following were included:   Quad set w/ AA to hyper; 10x w/ 5" hold  Quad set into tomeka; towel 4 fold under knee; 20x w/ 5" hold  SLR w/ towel 4 fold under knee; 3 x 10    THERAPEUTIC ACTIVITIES to improve dynamic and functional performance for 00 minutes including.    GAIT TRAINING to improve functional mobility and safety for 00 minutes.       Patient Education and Home Exercises     Home Exercises Provided and Patient Education Provided     Education provided:   - prognosis  - diagnosis  - continued HEP  - s/p precautions: DVT, infection, bandaging    Written Home Exercises Provided: yes. Exercises were reviewed and Trinity was able to demonstrate them prior to the end of the session.  Trinity demonstrated good  understanding of the education provided. See EMR under Patient Instructions for exercises provided during therapy sessions    ASSESSMENT   Trinity presented with slight loss of TKE but improved compared to last session. She was able to complete quad set into hyperextension and SLR without lag without NMES today. She also improved in flexion range and was able to complete full revolutions on upright bike without issue. Will continue to progress quad strength and gait function as able.   Trinity Is progressing well towards her " goals.   Pt prognosis is Good.     Pt will continue to benefit from skilled outpatient physical therapy to address the deficits listed in the problem list box on initial evaluation, provide pt/family education and to maximize pt's level of independence in the home and community environment.     Pt's spiritual, cultural and educational needs considered and pt agreeable to plan of care and goals.     Anticipated barriers to physical therapy: none    Goals:  Short Term Goals: 8-10 weeks (Progressing, not met)  1. Pt will be compliant with HEP 50% of prescribed amount.  2. The pt to demo improvement in L knee ROM to equal R knee ROM pain free   3.  The pt to demo good quad set with proper hyperextension moment of the L knee   4. The pt to demo ambualting with elast restricitve AD witout major compensatory pattern L knee for at least 20 feet      Long Term Goals: 6 months  (Progressing, not met)   Pt will be compliant with % of prescribed amount.   Patient will improve their FOTO limitation score to at least 17% as evidence of clinically significant improvements in their function.  The pt to demo pain free and uncompensated walking mechanics x10 min on an indoor treadmill  The pt to demo tolerance to a squat at or bellow parallel with uncompensated mechanics x10   The pt to demo strength of L LE within 10% of R LE as demo'd on the biodex machine   The pt will report full participation in ADLs and IADLs without restrictions related to L knee.    PLAN     Progress per POC    Jessica Kevin, PT, DPT

## 2023-07-13 ENCOUNTER — CLINICAL SUPPORT (OUTPATIENT)
Dept: REHABILITATION | Facility: HOSPITAL | Age: 28
End: 2023-07-13
Payer: MEDICAID

## 2023-07-13 DIAGNOSIS — M25.662 DECREASED RANGE OF MOTION OF LEFT KNEE: ICD-10-CM

## 2023-07-13 DIAGNOSIS — R26.9 GAIT ABNORMALITY: Primary | ICD-10-CM

## 2023-07-13 DIAGNOSIS — R29.898 DECREASED STRENGTH OF LOWER EXTREMITY: ICD-10-CM

## 2023-07-13 PROCEDURE — 97110 THERAPEUTIC EXERCISES: CPT

## 2023-07-13 NOTE — PROGRESS NOTES
[facility]   Physical Therapy Treatment Note     Name: Trinity Peres  [facility] Number: [id number]    Therapy Diagnosis:   Encounter Diagnoses   Name Primary?    Gait abnormality Yes    Decreased range of motion of left knee     Decreased strength of lower extremity      Physician: Fabiola Pool, *    Visit Date: 7/13/2023    Physician: Fabiola Pool, *     Physician Orders: PT Eval and Treat  Medical Diagnosis from Referral:   Diagnosis   S83.512D (ICD-10-CM) - Rupture of anterior cruciate ligament of left knee, subsequent encounter   S83.242D (ICD-10-CM) - Acute medial meniscus tear, left, subsequent encounter      Evaluation Date: 6/13/2023  Authorization Period Expiration: 09/12/2023  Plan of Care Expiration: 12/31/2023  Progress Note Due: 7/11/2023   Visit # / Visits authorized: 9/8 (+ eval)  FOTO #1 given at eval     Time In: 0120 pm   Time Out: 0215  Total Billable Time: 55 minutes - medicaid     DOS: 6/12/23  S/p: L ACLr  PROCEDURE:    Arthroscopic assisted ACL reconstruction using bone patellar tendon bone autograft  Bone grafting left tibia and patella  Post-Op Precautions: TTWB for another week, then may increase to 50/50 at week 4, then 100 from week 5-6), ROM 0-90 until 4 weeks post op, then advance to 120     Precautions: Standard, Weightbearing, and post op ACLr      SUBJECTIVE     Pt reports: She feels like her knee is a little stiff after being in the car all morning  Response to previous treatment: improved quad activation  Functional change: no functional change    Pain: 3/10  Location: left knee      OBJECTIVE     4 week 4 days s/p     Objective Measures updated at progress report unless specified.     Gait: Bilateral crutches, TTWB; step through pattern    Knee Passive Range of Motion:    Right  Left    Flexion 141 118   Extension Hyper 8 Hyper 8        Quad Set: good quality following NMES, partial hyperextension achieved        Joint Mobility:  "hypomobile patella in all directions and hypomobile infrapatellar fat pad       Treatment       Trinity received the treatments listed below:      THERAPEUTIC EXERCISES to develop strength, endurance, ROM, flexibility, posture, and core stabilization for 20 minutes including:   Heel prop; 4# above and below; 5 min   Hinge stretch; 20x w/ 5" hold  Upright bike; 8 min Lv 4.0 for aerobic activity    MANUAL THERAPY TECHNIQUES including Joint mobilizations and Soft tissue Mobilization were applied to left knee for 05 minutes.  Fat pad mob at 0 and 2 deg   Patellar mob Gr 3-4 in all directions  Seat EOT passive flexion with inf patellar glide 2 min      NEUROMUSCULAR RE-EDUCATION ACTIVITIES to improve Balance, Coordination, Kinesthetic, Sense, Proprioception, and Posture for  35 minutes.  The following were included:   NMES 10" on /20" off- 15 min plus set up  Quad set w/ AA to hyper; 10x w/ 5" hold  Quad set into tomeka; towel 4 fold under knee; 20x w/ 5" hold  SLR w/ towel 4 fold under knee; 3 x 10  LAQ; 30x 10 w/ 5" hold    THERAPEUTIC ACTIVITIES to improve dynamic and functional performance for 00 minutes including.    GAIT TRAINING to improve functional mobility and safety for 00 minutes.       Patient Education and Home Exercises     Home Exercises Provided and Patient Education Provided     Education provided:   - prognosis  - diagnosis  - continued HEP  - s/p precautions: DVT, infection, bandaging    Written Home Exercises Provided: yes. Exercises were reviewed and Trinity was able to demonstrate them prior to the end of the session.  Trinity demonstrated good  understanding of the education provided. See EMR under Patient Instructions for exercises provided during therapy sessions    ASSESSMENT   Trinity is progressing in quad control but continued to struggle for active hyperextension. She will benefit addition of CKC TKE next session to improve proprioception. She tolerated OKC knee extension without resistance " well with range limited to 90-40 degrees. Will continue to progress as able.     Trinity Is progressing well towards her goals.   Pt prognosis is Good.     Pt will continue to benefit from skilled outpatient physical therapy to address the deficits listed in the problem list box on initial evaluation, provide pt/family education and to maximize pt's level of independence in the home and community environment.     Pt's spiritual, cultural and educational needs considered and pt agreeable to plan of care and goals.     Anticipated barriers to physical therapy: none    Goals:  Short Term Goals: 8-10 weeks (Progressing, not met)  1. Pt will be compliant with HEP 50% of prescribed amount.  2. The pt to demo improvement in L knee ROM to equal R knee ROM pain free   3.  The pt to demo good quad set with proper hyperextension moment of the L knee   4. The pt to demo ambualting with elast restricitve AD witout major compensatory pattern L knee for at least 20 feet      Long Term Goals: 6 months  (Progressing, not met)   Pt will be compliant with % of prescribed amount.   Patient will improve their FOTO limitation score to at least 17% as evidence of clinically significant improvements in their function.  The pt to demo pain free and uncompensated walking mechanics x10 min on an indoor treadmill  The pt to demo tolerance to a squat at or bellow parallel with uncompensated mechanics x10   The pt to demo strength of L LE within 10% of R LE as demo'd on the biodex machine   The pt will report full participation in ADLs and IADLs without restrictions related to L knee.    PLAN     Progress per POC    Jessica Kevin, PT, DPT

## 2023-07-19 ENCOUNTER — OFFICE VISIT (OUTPATIENT)
Dept: PRIMARY CARE CLINIC | Facility: CLINIC | Age: 28
End: 2023-07-19
Payer: MEDICAID

## 2023-07-19 ENCOUNTER — PATIENT MESSAGE (OUTPATIENT)
Dept: RESEARCH | Facility: HOSPITAL | Age: 28
End: 2023-07-19
Payer: MEDICAID

## 2023-07-19 VITALS
BODY MASS INDEX: 34.1 KG/M2 | SYSTOLIC BLOOD PRESSURE: 113 MMHG | DIASTOLIC BLOOD PRESSURE: 76 MMHG | WEIGHT: 204.94 LBS | HEART RATE: 77 BPM

## 2023-07-19 DIAGNOSIS — K21.9 GASTROESOPHAGEAL REFLUX DISEASE WITHOUT ESOPHAGITIS: Primary | ICD-10-CM

## 2023-07-19 DIAGNOSIS — F41.1 GAD (GENERALIZED ANXIETY DISORDER): ICD-10-CM

## 2023-07-19 PROCEDURE — 1159F MED LIST DOCD IN RCRD: CPT | Mod: CPTII,,, | Performed by: STUDENT IN AN ORGANIZED HEALTH CARE EDUCATION/TRAINING PROGRAM

## 2023-07-19 PROCEDURE — 3078F DIAST BP <80 MM HG: CPT | Mod: CPTII,,, | Performed by: STUDENT IN AN ORGANIZED HEALTH CARE EDUCATION/TRAINING PROGRAM

## 2023-07-19 PROCEDURE — 99214 OFFICE O/P EST MOD 30 MIN: CPT | Mod: S$PBB,,, | Performed by: STUDENT IN AN ORGANIZED HEALTH CARE EDUCATION/TRAINING PROGRAM

## 2023-07-19 PROCEDURE — 99999 PR PBB SHADOW E&M-EST. PATIENT-LVL II: ICD-10-PCS | Mod: PBBFAC,,, | Performed by: STUDENT IN AN ORGANIZED HEALTH CARE EDUCATION/TRAINING PROGRAM

## 2023-07-19 PROCEDURE — 1159F PR MEDICATION LIST DOCUMENTED IN MEDICAL RECORD: ICD-10-PCS | Mod: CPTII,,, | Performed by: STUDENT IN AN ORGANIZED HEALTH CARE EDUCATION/TRAINING PROGRAM

## 2023-07-19 PROCEDURE — 3074F PR MOST RECENT SYSTOLIC BLOOD PRESSURE < 130 MM HG: ICD-10-PCS | Mod: CPTII,,, | Performed by: STUDENT IN AN ORGANIZED HEALTH CARE EDUCATION/TRAINING PROGRAM

## 2023-07-19 PROCEDURE — 3078F PR MOST RECENT DIASTOLIC BLOOD PRESSURE < 80 MM HG: ICD-10-PCS | Mod: CPTII,,, | Performed by: STUDENT IN AN ORGANIZED HEALTH CARE EDUCATION/TRAINING PROGRAM

## 2023-07-19 PROCEDURE — 3008F PR BODY MASS INDEX (BMI) DOCUMENTED: ICD-10-PCS | Mod: CPTII,,, | Performed by: STUDENT IN AN ORGANIZED HEALTH CARE EDUCATION/TRAINING PROGRAM

## 2023-07-19 PROCEDURE — 99999 PR PBB SHADOW E&M-EST. PATIENT-LVL II: CPT | Mod: PBBFAC,,, | Performed by: STUDENT IN AN ORGANIZED HEALTH CARE EDUCATION/TRAINING PROGRAM

## 2023-07-19 PROCEDURE — 99214 PR OFFICE/OUTPT VISIT, EST, LEVL IV, 30-39 MIN: ICD-10-PCS | Mod: S$PBB,,, | Performed by: STUDENT IN AN ORGANIZED HEALTH CARE EDUCATION/TRAINING PROGRAM

## 2023-07-19 PROCEDURE — 99212 OFFICE O/P EST SF 10 MIN: CPT | Mod: PBBFAC,PN | Performed by: STUDENT IN AN ORGANIZED HEALTH CARE EDUCATION/TRAINING PROGRAM

## 2023-07-19 PROCEDURE — 3008F BODY MASS INDEX DOCD: CPT | Mod: CPTII,,, | Performed by: STUDENT IN AN ORGANIZED HEALTH CARE EDUCATION/TRAINING PROGRAM

## 2023-07-19 PROCEDURE — 3074F SYST BP LT 130 MM HG: CPT | Mod: CPTII,,, | Performed by: STUDENT IN AN ORGANIZED HEALTH CARE EDUCATION/TRAINING PROGRAM

## 2023-07-19 NOTE — PROGRESS NOTES
07/19/2023    Trinity Peres  [id number]    Chief Complaint   Patient presents with    Follow-up    anxiety meds       HPI    This pt is known to me and presents for ED follow up and anxiety.    ED visit  10 days ago presented to the Ed for chest pain. Work up for PE negative.dx with GERD 2/2 to increased citrus intake. Takes pepcid or Tums Prn and has been eating dinner early.    Post op left knee surgery June 12  Has been recovering well. Currently doing PT 2x weekly    RACHID  Last visit switched from fluoxetine to sertraline  Did feel like she had improvements but for the last 1.5 weeks has been waking in the middle of the night and staying up for a few hours  Will watch tv until sleepy again  Still with crying spells, but less than before     Negative 10 point ROS outside of HPI    Social History     Socioeconomic History    Marital status: Single   Tobacco Use    Smoking status: Never    Smokeless tobacco: Never   Substance and Sexual Activity    Alcohol use: Never    Drug use: Never    Sexual activity: Yes     Partners: Female           Current Outpatient Medications:     ibuprofen (ADVIL,MOTRIN) 600 MG tablet, Take 1 tablet (600 mg total) by mouth 3 (three) times daily., Disp: 60 tablet, Rfl: 0    neomycin-polymyxin-dexamethasone (MAXITROL) 3.5mg/mL-10,000 unit/mL-0.1 % DrpS, Place 1 drop into the left eye 4 (four) times daily., Disp: , Rfl:     olopatadine (PATANOL) 0.1 % ophthalmic solution, Place 1 drop into both eyes 2 (two) times daily., Disp: , Rfl:     ondansetron (ZOFRAN-ODT) 4 MG TbDL, Take 1 tablet (4 mg total) by mouth 2 (two) times daily., Disp: 20 tablet, Rfl: 0    oxyCODONE-acetaminophen (PERCOCET) 5-325 mg per tablet, Take 1 tablet by mouth every 4 (four) hours as needed for Pain., Disp: 28 each, Rfl: 0    sertraline (ZOLOFT) 50 MG tablet, Take 1 tablet (50 mg total) by mouth once daily., Disp: 30 tablet, Rfl: 1      Physical Exam  Vitals:    07/19/23 0932   BP: 113/76   Pulse: 77       Gen: well  appearing, NAD  Resp: non labored breathing, no crackles, no wheezes, CTAB  CV: RRR no murmur, gallops, rubs, no LE edema  Abd: soft nontender BS present no organomegaly  Left leg: brace in place, healing incision no signs of infection    Problem List Items Addressed This Visit          Psychiatric    RACHID (generalized anxiety disorder)     Continue sertraline  Start melatonin, low dose if wakes in the night  Discussed sleep hygiene           Other Visit Diagnoses       Gastroesophageal reflux disease without esophagitis    -  Primary    Discussed lifestyle modifications       RTC as needed     Ana Maria Peter MD  Family Medicine

## 2023-07-20 ENCOUNTER — CLINICAL SUPPORT (OUTPATIENT)
Dept: REHABILITATION | Facility: HOSPITAL | Age: 28
End: 2023-07-20
Payer: MEDICAID

## 2023-07-20 DIAGNOSIS — R29.898 DECREASED STRENGTH OF LOWER EXTREMITY: ICD-10-CM

## 2023-07-20 DIAGNOSIS — R26.9 GAIT ABNORMALITY: Primary | ICD-10-CM

## 2023-07-20 DIAGNOSIS — M25.662 DECREASED RANGE OF MOTION OF LEFT KNEE: ICD-10-CM

## 2023-07-20 PROCEDURE — 97112 NEUROMUSCULAR REEDUCATION: CPT

## 2023-07-20 PROCEDURE — 97116 GAIT TRAINING THERAPY: CPT

## 2023-07-20 PROCEDURE — 97110 THERAPEUTIC EXERCISES: CPT

## 2023-07-20 NOTE — PROGRESS NOTES
[facility]   Physical Therapy Treatment Note     Name: Trinity Peres  [facility] Number: [id number]    Therapy Diagnosis:   Encounter Diagnoses   Name Primary?    Gait abnormality Yes    Decreased range of motion of left knee     Decreased strength of lower extremity        Physician: Fabiola Pool, *    Visit Date: 7/20/2023    Physician: Fabiola Pool, *     Physician Orders: PT Eval and Treat  Medical Diagnosis from Referral:   Diagnosis   S83.512D (ICD-10-CM) - Rupture of anterior cruciate ligament of left knee, subsequent encounter   S83.242D (ICD-10-CM) - Acute medial meniscus tear, left, subsequent encounter      Evaluation Date: 6/13/2023  Authorization Period Expiration: 09/12/2023  Plan of Care Expiration: 12/31/2023  Progress Note Due: 7/11/2023   Visit # / Visits authorized: 10/8 (+ eval)  FOTO #1 given at eval     Time In: 1000 am  Time Out: 1100 am  Total Billable Time: 60 minutes - medicaid     DOS: 6/12/23  S/p: L ACLr  PROCEDURE:    Arthroscopic assisted ACL reconstruction using bone patellar tendon bone autograft  Bone grafting left tibia and patella  Post-Op Precautions: TTWB for another week, then may increase to 50/50 at week 4, then 100 from week 5-6), ROM 0-90 until 4 weeks post op, then advance to 120     Precautions: Standard, Weightbearing, and post op ACLr      SUBJECTIVE     Pt reports: She has been working on her quad and feels good  Response to previous treatment: improved quad activation  Functional change: no functional change    Pain: 3/10  Location: left knee      OBJECTIVE     5 week 3 days s/p     Objective Measures updated at progress report unless specified.     Gait: Bilateral crutches, TTWB; step through pattern    Knee Passive Range of Motion:    Right  Left    Flexion 141 118   Extension Hyper 8 Hyper 8        Quad Set: good quality following NMES, partial hyperextension achieved        Joint Mobility: hypomobile patella in all  "directions and hypomobile infrapatellar fat pad       Treatment       Trinity received the treatments listed below:      THERAPEUTIC EXERCISES to develop strength, endurance, ROM, flexibility, posture, and core stabilization for 15 minutes including:   Heel prop; 4# above and below; 5 min   Hinge stretch; 10x 10"   Upright bike; 8 min Lv 4.0 for aerobic activity      MANUAL THERAPY TECHNIQUES including Joint mobilizations and Soft tissue Mobilization were applied to left knee for 05 minutes.  Fat pad mob at 0 and 2 deg   Patellar mob Gr 3-4 in all directions  Seat EOT passive flexion with inf patellar glide 2 min      NEUROMUSCULAR RE-EDUCATION ACTIVITIES to improve Balance, Coordination, Kinesthetic, Sense, Proprioception, and Posture for  30 minutes.  The following were included:   Quad set w/ AA to hyper; 10x w/ 5" hold  Quad set into tomeka; towel 4 fold under knee; 20x w/ 5" hold  SLR w/ towel 4 fold under knee; 3 x 10  Standing TKE; GTB; 20x w/ 5" hold  DL STS wt 20" box; 3 x 10  Shuttle DL Leg press; 1 black band; 20x - NP    Start BFR next session    THERAPEUTIC ACTIVITIES to improve dynamic and functional performance for 00 minutes including.      GAIT TRAINING to improve functional mobility and safety for 10 minutes.   Carly step over with 1 crutch x 6 hurdles    Patient Education and Home Exercises     Home Exercises Provided and Patient Education Provided     Education provided:   - prognosis  - diagnosis  - continued HEP  - s/p precautions: DVT, infection, bandaging    Written Home Exercises Provided: yes. Exercises were reviewed and Trinity was able to demonstrate them prior to the end of the session.  Trinity demonstrated good  understanding of the education provided. See EMR under Patient Instructions for exercises provided during therapy sessions    ASSESSMENT   Trinity tolerated progressed WB today with emphasis on gait mechanics. She is progressing well with this but continues to lack active " hyperextension. Will continue to emphasize this prior with continued progress on general strength and mobility.     Trinity Is progressing well towards her goals.   Pt prognosis is Good.     Pt will continue to benefit from skilled outpatient physical therapy to address the deficits listed in the problem list box on initial evaluation, provide pt/family education and to maximize pt's level of independence in the home and community environment.     Pt's spiritual, cultural and educational needs considered and pt agreeable to plan of care and goals.     Anticipated barriers to physical therapy: none    Goals:  Short Term Goals: 8-10 weeks (Progressing, not met)  1. Pt will be compliant with HEP 50% of prescribed amount.  2. The pt to demo improvement in L knee ROM to equal R knee ROM pain free   3.  The pt to demo good quad set with proper hyperextension moment of the L knee   4. The pt to demo ambualting with elast restricitve AD witout major compensatory pattern L knee for at least 20 feet      Long Term Goals: 6 months  (Progressing, not met)   Pt will be compliant with % of prescribed amount.   Patient will improve their FOTO limitation score to at least 17% as evidence of clinically significant improvements in their function.  The pt to demo pain free and uncompensated walking mechanics x10 min on an indoor treadmill  The pt to demo tolerance to a squat at or bellow parallel with uncompensated mechanics x10   The pt to demo strength of L LE within 10% of R LE as demo'd on the biodex machine   The pt will report full participation in ADLs and IADLs without restrictions related to L knee.    PLAN     Progress per POC    Jessica Kevin, PT, DPT

## 2023-07-21 ENCOUNTER — OFFICE VISIT (OUTPATIENT)
Dept: ORTHOPEDICS | Facility: CLINIC | Age: 28
End: 2023-07-21
Payer: MEDICAID

## 2023-07-21 VITALS
DIASTOLIC BLOOD PRESSURE: 78 MMHG | WEIGHT: 204.81 LBS | BODY MASS INDEX: 34.12 KG/M2 | HEART RATE: 88 BPM | HEIGHT: 65 IN | SYSTOLIC BLOOD PRESSURE: 121 MMHG

## 2023-07-21 DIAGNOSIS — Z98.890 S/P ACL RECONSTRUCTION: Primary | ICD-10-CM

## 2023-07-21 PROCEDURE — 3008F BODY MASS INDEX DOCD: CPT | Mod: CPTII,,,

## 2023-07-21 PROCEDURE — 99999 PR PBB SHADOW E&M-EST. PATIENT-LVL III: ICD-10-PCS | Mod: PBBFAC,,,

## 2023-07-21 PROCEDURE — 99024 POSTOP FOLLOW-UP VISIT: CPT | Mod: ,,,

## 2023-07-21 PROCEDURE — 99999 PR PBB SHADOW E&M-EST. PATIENT-LVL III: CPT | Mod: PBBFAC,,,

## 2023-07-21 PROCEDURE — 3008F PR BODY MASS INDEX (BMI) DOCUMENTED: ICD-10-PCS | Mod: CPTII,,,

## 2023-07-21 PROCEDURE — 99213 OFFICE O/P EST LOW 20 MIN: CPT | Mod: PBBFAC,PN

## 2023-07-21 PROCEDURE — 1159F MED LIST DOCD IN RCRD: CPT | Mod: CPTII,,,

## 2023-07-21 PROCEDURE — 3078F DIAST BP <80 MM HG: CPT | Mod: CPTII,,,

## 2023-07-21 PROCEDURE — 3078F PR MOST RECENT DIASTOLIC BLOOD PRESSURE < 80 MM HG: ICD-10-PCS | Mod: CPTII,,,

## 2023-07-21 PROCEDURE — 3074F SYST BP LT 130 MM HG: CPT | Mod: CPTII,,,

## 2023-07-21 PROCEDURE — 1159F PR MEDICATION LIST DOCUMENTED IN MEDICAL RECORD: ICD-10-PCS | Mod: CPTII,,,

## 2023-07-21 PROCEDURE — 3074F PR MOST RECENT SYSTOLIC BLOOD PRESSURE < 130 MM HG: ICD-10-PCS | Mod: CPTII,,,

## 2023-07-21 PROCEDURE — 99024 PR POST-OP FOLLOW-UP VISIT: ICD-10-PCS | Mod: ,,,

## 2023-07-21 NOTE — PROGRESS NOTES
Post-op Note    HPI    Trinity Peres is here 6 weeks s/p the following procedure:     6/12/2023     Arthroscopic assisted ACL reconstruction using bone patellar tendon bone autograft  Bone grafting left tibia and patella    Overall doing well. Pain controlled on current regimen. She is currently enrolled in Physical Therapy. Denies any chest pain or shortness of breathe. Denies any drainage from the incision. Denies any fevers, chills or paresthesias. Has been weight bearing.     DVT Prophylaxis: completed      Physical Exam:     Patient is alert and oriented no acute distress.   Assistive Device: tscope brace, crutches    Left knee: sutures removed Incision(s) are well healed.  There is no evidence of dehiscence.  There is no induration erythema or signs of infection.  Appropriate soft tissue swelling.  Compartments are soft and compressible.  Warm well-perfused extremity. ROM 0-120     Imaging:  Left knee xrays reveal s/p ACL reconstruction with no evidence of hardware complication    Assessment    Trinity Peres is 6 weeks Post-op     Plan:    Overall doing as expected.  We discussed expectations of surgery and postoperative course.     Pain: Continued postoperative pain regimen -- ibuprofen   PT/OT: Continue/Initiate physical therapy: WBAT, cont PT. May fully d/c crutches and bracing    Follow-up: 6 weeks habashy   X-rays next visit: none

## 2023-07-24 ENCOUNTER — PATIENT MESSAGE (OUTPATIENT)
Dept: RESEARCH | Facility: HOSPITAL | Age: 28
End: 2023-07-24
Payer: MEDICAID

## 2023-07-25 ENCOUNTER — CLINICAL SUPPORT (OUTPATIENT)
Dept: REHABILITATION | Facility: HOSPITAL | Age: 28
End: 2023-07-25
Payer: MEDICAID

## 2023-07-25 DIAGNOSIS — R26.9 GAIT ABNORMALITY: Primary | ICD-10-CM

## 2023-07-25 DIAGNOSIS — M25.662 DECREASED RANGE OF MOTION OF LEFT KNEE: ICD-10-CM

## 2023-07-25 DIAGNOSIS — R29.898 DECREASED STRENGTH OF LOWER EXTREMITY: ICD-10-CM

## 2023-07-25 PROCEDURE — 97110 THERAPEUTIC EXERCISES: CPT

## 2023-07-25 NOTE — PROGRESS NOTES
[facility]   Physical Therapy Treatment Note     Name: Trinity Peres  [facility] Number: [id number]    Therapy Diagnosis:   Encounter Diagnoses   Name Primary?    Gait abnormality Yes    Decreased range of motion of left knee     Decreased strength of lower extremity        Physician: Fabiola Pool, *    Visit Date: 7/25/2023    Physician: Fabiola Pool, *     Physician Orders: PT Eval and Treat  Medical Diagnosis from Referral:   Diagnosis   S83.512D (ICD-10-CM) - Rupture of anterior cruciate ligament of left knee, subsequent encounter   S83.242D (ICD-10-CM) - Acute medial meniscus tear, left, subsequent encounter      Evaluation Date: 6/13/2023  Authorization Period Expiration: 09/12/2023  Plan of Care Expiration: 12/31/2023  Progress Note Due: 7/11/2023   Visit # / Visits authorized: 11/20 (+ eval)  FOTO #1 given at eval     Time In: 1000 am  Time Out: 1100 am  Total Billable Time: 60 minutes - medicaid     DOS: 6/12/23  S/p: L ACLr  PROCEDURE:    Arthroscopic assisted ACL reconstruction using bone patellar tendon bone autograft  Bone grafting left tibia and patella  Post-Op Precautions: TTWB for another week, then may increase to 50/50 at week 4, then 100 from week 5-6), ROM 0-90 until 4 weeks post op, then advance to 120     Precautions: Standard, Weightbearing, and post op ACLr      SUBJECTIVE     Pt reports: She has been working on her quad and feels good  Response to previous treatment: improved quad activation  Functional change: no functional change    Pain: 3/10  Location: left knee      OBJECTIVE     5 week 3 days s/p     Objective Measures updated at progress report unless specified.     Gait: Bilateral crutches, TTWB; step through pattern    Knee Passive Range of Motion:    Right  Left    Flexion 141 118   Extension Hyper 8 Hyper 8        Quad Set: good quality following NMES, partial hyperextension achieved        Joint Mobility: hypomobile patella in all  "directions and hypomobile infrapatellar fat pad       Treatment       Trinity received the treatments listed below:      THERAPEUTIC EXERCISES to develop strength, endurance, ROM, flexibility, posture, and core stabilization for 15 minutes including:   Heel prop; 4# above and below; 5 min   Hinge stretch; 10x 10"   Upright bike; 8 min Lv 4.0 for aerobic activity      MANUAL THERAPY TECHNIQUES including Joint mobilizations and Soft tissue Mobilization were applied to left knee for 10 minutes.  Fat pad mob at 0 and 2 deg   Patellar mob Gr 3-4 in all directions  Seat EOT passive flexion with inf patellar glide 2 min      NEUROMUSCULAR RE-EDUCATION ACTIVITIES to improve Balance, Coordination, Kinesthetic, Sense, Proprioception, and Posture for  35 minutes.  The following were included:   Quad set w/ AA to hyper; 10x w/ 5" hold  Quad set into tomeka; towel 4 fold under knee; 20x w/ 5" hold w/ NMES  SLR w/ towel folded into 4 under knee; 3 x 10 w/ NMES  SLR w/ towel 4 fold under knee; 3 x 10  Standing TKE; GTB; 20x w/ 5" hold  DL STS wt 20" box; 3 x 10- NP  Shuttle DL Leg press; 1 black band; 20x - NP    BFR 70% occlusion 30/15/15/15  - LAQ     THERAPEUTIC ACTIVITIES to improve dynamic and functional performance for 00 minutes including.      GAIT TRAINING to improve functional mobility and safety for 00 minutes.   Carly step over with 1 crutch x 6 hurdles    Patient Education and Home Exercises     Home Exercises Provided and Patient Education Provided     Education provided:   - prognosis  - diagnosis  - continued HEP  - s/p precautions: DVT, infection, bandaging    Written Home Exercises Provided: yes. Exercises were reviewed and Trinity was able to demonstrate them prior to the end of the session.  Trinity demonstrated good  understanding of the education provided. See EMR under Patient Instructions for exercises provided during therapy sessions    ASSESSMENT   Trinity is continuing to struggle with active " hyperextension. She will benefit continued emphasis on quad control and strength as she continues to progress.   Trinity Is progressing well towards her goals.   Pt prognosis is Good.     Pt will continue to benefit from skilled outpatient physical therapy to address the deficits listed in the problem list box on initial evaluation, provide pt/family education and to maximize pt's level of independence in the home and community environment.     Pt's spiritual, cultural and educational needs considered and pt agreeable to plan of care and goals.     Anticipated barriers to physical therapy: none    Goals:  Short Term Goals: 8-10 weeks (Progressing, not met)  1. Pt will be compliant with HEP 50% of prescribed amount.  2. The pt to demo improvement in L knee ROM to equal R knee ROM pain free   3.  The pt to demo good quad set with proper hyperextension moment of the L knee   4. The pt to demo ambualting with elast restricitve AD witout major compensatory pattern L knee for at least 20 feet      Long Term Goals: 6 months  (Progressing, not met)   Pt will be compliant with % of prescribed amount.   Patient will improve their FOTO limitation score to at least 17% as evidence of clinically significant improvements in their function.  The pt to demo pain free and uncompensated walking mechanics x10 min on an indoor treadmill  The pt to demo tolerance to a squat at or bellow parallel with uncompensated mechanics x10   The pt to demo strength of L LE within 10% of R LE as demo'd on the biodex machine   The pt will report full participation in ADLs and IADLs without restrictions related to L knee.    PLAN     Progress per POC    Jessica Kevin, PT, DPT

## 2023-07-27 ENCOUNTER — CLINICAL SUPPORT (OUTPATIENT)
Dept: REHABILITATION | Facility: HOSPITAL | Age: 28
End: 2023-07-27
Payer: MEDICAID

## 2023-07-27 DIAGNOSIS — R26.9 GAIT ABNORMALITY: Primary | ICD-10-CM

## 2023-07-27 DIAGNOSIS — R29.898 DECREASED STRENGTH OF LOWER EXTREMITY: ICD-10-CM

## 2023-07-27 DIAGNOSIS — M25.662 DECREASED RANGE OF MOTION OF LEFT KNEE: ICD-10-CM

## 2023-07-27 PROCEDURE — 97112 NEUROMUSCULAR REEDUCATION: CPT

## 2023-07-27 PROCEDURE — 97110 THERAPEUTIC EXERCISES: CPT

## 2023-07-27 PROCEDURE — 97140 MANUAL THERAPY 1/> REGIONS: CPT

## 2023-07-27 NOTE — PROGRESS NOTES
[facility]   Physical Therapy Treatment Note     Name: Trinity Peres  [facility] Number: [id number]    Therapy Diagnosis:   Encounter Diagnoses   Name Primary?    Gait abnormality Yes    Decreased range of motion of left knee     Decreased strength of lower extremity      Physician: Fabiola Pool, *    Visit Date: 7/27/2023    Physician: Fabiola Pool, *     Physician Orders: PT Eval and Treat  Medical Diagnosis from Referral:   Diagnosis   S83.512D (ICD-10-CM) - Rupture of anterior cruciate ligament of left knee, subsequent encounter   S83.242D (ICD-10-CM) - Acute medial meniscus tear, left, subsequent encounter      Evaluation Date: 6/13/2023  Authorization Period Expiration: 09/12/2023  Plan of Care Expiration: 12/31/2023  Progress Note Due: 7/11/2023   Visit # / Visits authorized: 12/20 (+ eval)  FOTO #1 given at eval     Time In: 1000 am  Time Out: 1100 am  Total Billable Time: 60 minutes - medicaid     DOS: 6/12/23  S/p: L ACLr  PROCEDURE:    Arthroscopic assisted ACL reconstruction using bone patellar tendon bone autograft  Bone grafting left tibia and patella  Post-Op Precautions: TTWB for another week, then may increase to 50/50 at week 4, then 100 from week 5-6), ROM 0-90 until 4 weeks post op, then advance to 120     Precautions: Standard, Weightbearing, and post op ACLr      SUBJECTIVE     Pt reports: she's been having increased anterior knee pain for the last 2 days  Response to previous treatment: improved quad activation  Functional change: no functional change    Pain: 3/10  Location: left knee      OBJECTIVE     6 week 4 days s/p     Objective Measures updated at progress report unless specified.     Gait: Bilateral crutches, TTWB; step through pattern    Knee Passive Range of Motion:    Right  Left    Flexion 141 125   Extension Hyper 8 Hyper 8        Quad Set: good quality following NMES, partial hyperextension achieved        Joint Mobility: hypomobile  "patella in all directions and hypomobile infrapatellar fat pad       Treatment       Trinity received the treatments listed below:      THERAPEUTIC EXERCISES to develop strength, endurance, ROM, flexibility, posture, and core stabilization for 15 minutes including:   Heel prop; 4# above and below; 5 min   Hinge stretch; 10x 10"   Upright bike; 8 min Lv 4.0 for aerobic activity  Heel slide; 3 min       MANUAL THERAPY TECHNIQUES including Joint mobilizations and Soft tissue Mobilization were applied to left knee for 10 minutes.  Fat pad mob at 0 and 2o deg   Patellar mob Gr 3-4 in all directions  Seat EOT passive flexion with inf patellar glide 2 min      NEUROMUSCULAR RE-EDUCATION ACTIVITIES to improve Balance, Coordination, Kinesthetic, Sense, Proprioception, and Posture for  35 minutes.  The following were included:   Quad set w/ AA to hyper; 10x w/ 5" hold w/ NMES 10" on 20" off  SLR w/ towel 4 fold under knee; 3 x 10 w/ NMES 10" on 20" off  SLS 20x 5" hold  Shuttle SL Leg press; 1 black band; 3 x 15 w/ 3" conc and 3" ecc      NP Today:   Standing TKE; GTB; 20x w/ 5" hold w/ NMES 10" on 20" off  DL STS wt 20" box; 3 x 10    THERAPEUTIC ACTIVITIES to improve dynamic and functional performance for 00 minutes including.      GAIT TRAINING to improve functional mobility and safety for 00 minutes.   Carly step over with 1 crutch x 6 hurdles    Patient Education and Home Exercises     Home Exercises Provided and Patient Education Provided     Education provided:   - prognosis  - diagnosis  - continued HEP  - s/p precautions: DVT, infection, bandaging    Written Home Exercises Provided: yes. Exercises were reviewed and Trinity was able to demonstrate them prior to the end of the session.  Trinity demonstrated good  understanding of the education provided. See EMR under Patient Instructions for exercises provided during therapy sessions    ASSESSMENT   Trinity is improving in quad control and was able to achieve active " hyperextension today but cannot perform this in full range compared to contralateral limb. She performed well with tempo SL leg press and stability drill for proprioception. Will continue to progress as able.     Trinity Is progressing well towards her goals.   Pt prognosis is Good.     Pt will continue to benefit from skilled outpatient physical therapy to address the deficits listed in the problem list box on initial evaluation, provide pt/family education and to maximize pt's level of independence in the home and community environment.     Pt's spiritual, cultural and educational needs considered and pt agreeable to plan of care and goals.     Anticipated barriers to physical therapy: none    Goals:  Short Term Goals: 8-10 weeks (Progressing, not met)  1. Pt will be compliant with HEP 50% of prescribed amount.  2. The pt to demo improvement in L knee ROM to equal R knee ROM pain free   3.  The pt to demo good quad set with proper hyperextension moment of the L knee   4. The pt to demo ambualting with elast restricitve AD witout major compensatory pattern L knee for at least 20 feet      Long Term Goals: 6 months  (Progressing, not met)   Pt will be compliant with % of prescribed amount.   Patient will improve their FOTO limitation score to at least 17% as evidence of clinically significant improvements in their function.  The pt to demo pain free and uncompensated walking mechanics x10 min on an indoor treadmill  The pt to demo tolerance to a squat at or bellow parallel with uncompensated mechanics x10   The pt to demo strength of L LE within 10% of R LE as demo'd on the biodex machine   The pt will report full participation in ADLs and IADLs without restrictions related to L knee.    PLAN     Progress per POC    Jessica Kevin, PT, DPT

## 2023-08-01 ENCOUNTER — CLINICAL SUPPORT (OUTPATIENT)
Dept: REHABILITATION | Facility: HOSPITAL | Age: 28
End: 2023-08-01
Payer: MEDICAID

## 2023-08-01 DIAGNOSIS — M25.662 DECREASED RANGE OF MOTION OF LEFT KNEE: ICD-10-CM

## 2023-08-01 DIAGNOSIS — R26.9 GAIT ABNORMALITY: Primary | ICD-10-CM

## 2023-08-01 DIAGNOSIS — R29.898 DECREASED STRENGTH OF LOWER EXTREMITY: ICD-10-CM

## 2023-08-01 PROCEDURE — 97110 THERAPEUTIC EXERCISES: CPT

## 2023-08-01 PROCEDURE — 97140 MANUAL THERAPY 1/> REGIONS: CPT

## 2023-08-01 PROCEDURE — 97112 NEUROMUSCULAR REEDUCATION: CPT

## 2023-08-01 NOTE — PROGRESS NOTES
[facility]   Physical Therapy Treatment Note     Name: Trinity Peres  [facility] Number: [id number]    Therapy Diagnosis:   Encounter Diagnoses   Name Primary?    Gait abnormality Yes    Decreased range of motion of left knee     Decreased strength of lower extremity      Physician: Fabiola Pool, *    Visit Date: 8/1/2023    Physician: Fabiola Pool, *     Physician Orders: PT Eval and Treat  Medical Diagnosis from Referral:   Diagnosis   S83.512D (ICD-10-CM) - Rupture of anterior cruciate ligament of left knee, subsequent encounter   S83.242D (ICD-10-CM) - Acute medial meniscus tear, left, subsequent encounter      Evaluation Date: 6/13/2023  Authorization Period Expiration: 09/12/2023  Plan of Care Expiration: 12/31/2023  Progress Note Due: 7/11/2023   Visit # / Visits authorized: 13/20 (+ eval)  FOTO #1 given at eval     Time In: 0904 am  Time Out: 1000 am  Total Billable Time: 56 minutes - medicaid     DOS: 6/12/23  S/p: L ACLr  PROCEDURE:    Arthroscopic assisted ACL reconstruction using bone patellar tendon bone autograft  Bone grafting left tibia and patella  Post-Op Precautions: TTWB for another week, then may increase to 50/50 at week 4, then 100 from week 5-6), ROM 0-90 until 4 weeks post op, then advance to 120     Precautions: Standard, Weightbearing, and post op ACLr      SUBJECTIVE     Pt reports: she has been in the car for an hour so she feels like her knee might be a little stiff  Response to previous treatment: improved quad activation  Functional change: no functional change    Pain: 3/10  Location: left knee      OBJECTIVE     7 week 2 days s/p     Objective Measures updated at progress report unless specified.     Gait: Bilateral crutches, TTWB; step through pattern    Knee Passive Range of Motion:    Right  Left    Flexion 141 125   Extension Hyper 8 Hyper 8        Quad Set: good quality following NMES, partial hyperextension achieved        Joint  "Mobility: hypomobile patella in all directions and hypomobile infrapatellar fat pad       Treatment       Trinity received the treatments listed below:      THERAPEUTIC EXERCISES to develop strength, endurance, ROM, flexibility, posture, and core stabilization for 15 minutes including:   Heel prop; 4# above and below; 5 min   Hinge stretch; 10x 10"   Upright bike; 8 min Lv 4.0 for aerobic activity  Heel slide; 3 min       MANUAL THERAPY TECHNIQUES including Joint mobilizations and Soft tissue Mobilization were applied to left knee for 10 minutes.  Fat pad mob at 0 and 2o deg   Patellar mob Gr 3-4 in all directions  Seat EOT passive flexion with inf patellar glide 2 min      NEUROMUSCULAR RE-EDUCATION ACTIVITIES to improve Balance, Coordination, Kinesthetic, Sense, Proprioception, and Posture for  35 minutes.  The following were included:   SAQ over 1/2 foam w/ mTrigger biofeedback; 6 min  Quad set w/ heel prop; w/ mTrigger biofeedback; 6 min  Standing TKE; GTB; 20x w/ 5" hold w/ mTrigger biofeedback; 6 min  Quad set w/ heel prop; 3 min w/ NMES 10" on 20" off  SLR w/ towel 4 fold under knee; 3 min w/ NMES 10" on 20" off      NP Today:   DL STS wt 20" box; 3 x 10  SLS 20x 5" hold  Shuttle SL Leg press; 1 black band; 3 x 15 w/ 3" conc and 3" ecc    THERAPEUTIC ACTIVITIES to improve dynamic and functional performance for 00 minutes including.      GAIT TRAINING to improve functional mobility and safety for 00 minutes.   Carly step over with 1 crutch x 6 hurdles    Patient Education and Home Exercises     Home Exercises Provided and Patient Education Provided     Education provided:   - prognosis  - diagnosis  - continued HEP  - s/p precautions: DVT, infection, bandaging    Written Home Exercises Provided: yes. Exercises were reviewed and Trinity was able to demonstrate them prior to the end of the session.  Trinity demonstrated good  understanding of the education provided. See EMR under Patient Instructions for " exercises provided during therapy sessions    ASSESSMENT   Trinity struggled to avoid hip flexor dominance in quad set into hyperextension. She benefited from biofeedback with improved quad contraction following this but was unable to achieve active hyperextension without compensation. She was instructed continued emphasize this in her HEP.      Trinity Is progressing well towards her goals.   Pt prognosis is Good.     Pt will continue to benefit from skilled outpatient physical therapy to address the deficits listed in the problem list box on initial evaluation, provide pt/family education and to maximize pt's level of independence in the home and community environment.     Pt's spiritual, cultural and educational needs considered and pt agreeable to plan of care and goals.     Anticipated barriers to physical therapy: none    Goals:  Short Term Goals: 8-10 weeks (Progressing, not met)  1. Pt will be compliant with HEP 50% of prescribed amount.  2. The pt to demo improvement in L knee ROM to equal R knee ROM pain free   3.  The pt to demo good quad set with proper hyperextension moment of the L knee   4. The pt to demo ambualting with elast restricitve AD witout major compensatory pattern L knee for at least 20 feet      Long Term Goals: 6 months  (Progressing, not met)   Pt will be compliant with % of prescribed amount.   Patient will improve their FOTO limitation score to at least 17% as evidence of clinically significant improvements in their function.  The pt to demo pain free and uncompensated walking mechanics x10 min on an indoor treadmill  The pt to demo tolerance to a squat at or bellow parallel with uncompensated mechanics x10   The pt to demo strength of L LE within 10% of R LE as demo'd on the biodex machine   The pt will report full participation in ADLs and IADLs without restrictions related to L knee.    PLAN     Progress per POC    Jessica Kevin, PT, DPT

## 2023-08-08 ENCOUNTER — CLINICAL SUPPORT (OUTPATIENT)
Dept: REHABILITATION | Facility: HOSPITAL | Age: 28
End: 2023-08-08
Payer: MEDICAID

## 2023-08-08 DIAGNOSIS — M25.662 DECREASED RANGE OF MOTION OF LEFT KNEE: ICD-10-CM

## 2023-08-08 DIAGNOSIS — R29.898 DECREASED STRENGTH OF LOWER EXTREMITY: ICD-10-CM

## 2023-08-08 DIAGNOSIS — R26.9 GAIT ABNORMALITY: Primary | ICD-10-CM

## 2023-08-08 PROCEDURE — 97110 THERAPEUTIC EXERCISES: CPT

## 2023-08-08 NOTE — PROGRESS NOTES
[facility]   Physical Therapy Treatment Note     Name: Trinity Peres  [facility] Number: [id number]    Therapy Diagnosis:   Encounter Diagnoses   Name Primary?    Gait abnormality Yes    Decreased range of motion of left knee     Decreased strength of lower extremity      Physician: Fabiola Pool, *    Visit Date: 8/8/2023    Physician: Fabiola Pool, *     Physician Orders: PT Eval and Treat  Medical Diagnosis from Referral:   Diagnosis   S83.512D (ICD-10-CM) - Rupture of anterior cruciate ligament of left knee, subsequent encounter   S83.242D (ICD-10-CM) - Acute medial meniscus tear, left, subsequent encounter      Evaluation Date: 6/13/2023  Authorization Period Expiration: 09/12/2023  Plan of Care Expiration: 12/31/2023  Progress Note Due: 7/11/2023   Visit # / Visits authorized: 14/20 (+ eval)  FOTO #1 given at eval     Time In: 0900 am  Time Out: 1000 am  Total Billable Time: 60 minutes - medicaid     DOS: 6/12/23  S/p: L ACLr  PROCEDURE:    Arthroscopic assisted ACL reconstruction using bone patellar tendon bone autograft  Bone grafting left tibia and patella  Post-Op Precautions: TTWB for another week, then may increase to 50/50 at week 4, then 100 from week 5-6), ROM 0-90 until 4 weeks post op, then advance to 120     Precautions: Standard, Weightbearing, and post op ACLr      SUBJECTIVE     Pt reports: she overslept and missed her last appointment  Response to previous treatment: improved quad activation  Functional change: no functional change    Pain: 3/10  Location: left knee      OBJECTIVE     7 week 2 days s/p     Objective Measures updated at progress report unless specified.     Gait: Bilateral crutches, TTWB; step through pattern    Knee Passive Range of Motion:    Right  Left    Flexion 141 125   Extension Hyper 8 Hyper 8        Quad Set: good quality following NMES, partial hyperextension achieved        Joint Mobility: hypomobile patella in all directions  "and hypomobile infrapatellar fat pad       Treatment       Trinity received the treatments listed below:      THERAPEUTIC EXERCISES to develop strength, endurance, ROM, flexibility, posture, and core stabilization for 15 minutes including:   Heel prop; 4# above and below; 5 min   Hinge stretch; 10x 10"   Upright bike; 8 min Lv 4.0 for aerobic activity  Heel slide; 3 min       MANUAL THERAPY TECHNIQUES including Joint mobilizations and Soft tissue Mobilization were applied to left knee for 10 minutes.  Fat pad mob at 0 and 2o deg   Patellar mob Gr 3-4 in all directions  Seat EOT passive flexion with inf patellar glide 2 min      NEUROMUSCULAR RE-EDUCATION ACTIVITIES to improve Balance, Coordination, Kinesthetic, Sense, Proprioception, and Posture for  35 minutes.  The following were included:   DL STS wt 20" box; 3 x 10  Quad set w/ AAROM strap 3 min w/ NMES 10" on 20" off  SLR  3 min w/ NMES 10" on 20" off  Wall sit: 3 x 20" at 30-40 deg flex  SLS 4 x 20" hold    NP Today:   Shuttle SL Leg press; 1 black band; 3 x 15 w/ 3" conc and 3" ecc  Quad set w/ heel prop; w/ mTrigger biofeedback; 6 min  Standing TKE; GTB; 20x w/ 5" hold w/ mTrigger biofeedback; 6 min  SAQ over 1/2 foam w/ mTrigger biofeedback; 6 min    THERAPEUTIC ACTIVITIES to improve dynamic and functional performance for 00 minutes including.      GAIT TRAINING to improve functional mobility and safety for 00 minutes.   Carly step over with 1 crutch x 6 hurdles    Patient Education and Home Exercises     Home Exercises Provided and Patient Education Provided     Education provided:   - prognosis  - diagnosis  - continued HEP  - s/p precautions: DVT, infection, bandaging    Written Home Exercises Provided: yes. Exercises were reviewed and Trinity was able to demonstrate them prior to the end of the session.  Trinity demonstrated good  understanding of the education provided. See EMR under Patient Instructions for exercises provided during therapy " sessions    ASSESSMENT   Trinity improved with quad control following NMES compared to last session. She also tolerated increased angle holds for improved proprioceptive  input. She will benefit continued progressive functional strengthening while continuing to improve in active hyperextension for symmetrical gait.     Trinity Is progressing well towards her goals.   Pt prognosis is Good.     Pt will continue to benefit from skilled outpatient physical therapy to address the deficits listed in the problem list box on initial evaluation, provide pt/family education and to maximize pt's level of independence in the home and community environment.     Pt's spiritual, cultural and educational needs considered and pt agreeable to plan of care and goals.     Anticipated barriers to physical therapy: none    Goals:  Short Term Goals: 8-10 weeks (Progressing, not met)  1. Pt will be compliant with HEP 50% of prescribed amount.  2. The pt to demo improvement in L knee ROM to equal R knee ROM pain free   3.  The pt to demo good quad set with proper hyperextension moment of the L knee   4. The pt to demo ambualting with elast restricitve AD witout major compensatory pattern L knee for at least 20 feet      Long Term Goals: 6 months  (Progressing, not met)   Pt will be compliant with % of prescribed amount.   Patient will improve their FOTO limitation score to at least 17% as evidence of clinically significant improvements in their function.  The pt to demo pain free and uncompensated walking mechanics x10 min on an indoor treadmill  The pt to demo tolerance to a squat at or bellow parallel with uncompensated mechanics x10   The pt to demo strength of L LE within 10% of R LE as demo'd on the biodex machine   The pt will report full participation in ADLs and IADLs without restrictions related to L knee.    PLAN     Progress per POC    Jessica Kevin, PT, DPT

## 2023-08-10 ENCOUNTER — CLINICAL SUPPORT (OUTPATIENT)
Dept: REHABILITATION | Facility: HOSPITAL | Age: 28
End: 2023-08-10
Payer: MEDICAID

## 2023-08-10 DIAGNOSIS — R29.898 DECREASED STRENGTH OF LOWER EXTREMITY: ICD-10-CM

## 2023-08-10 DIAGNOSIS — M25.662 DECREASED RANGE OF MOTION OF LEFT KNEE: ICD-10-CM

## 2023-08-10 DIAGNOSIS — R26.9 GAIT ABNORMALITY: Primary | ICD-10-CM

## 2023-08-10 PROCEDURE — 97110 THERAPEUTIC EXERCISES: CPT

## 2023-08-10 NOTE — PROGRESS NOTES
March 22, 2019     Faustino Resendiz is an 83 yo gentleman with minimal past medical history including HTN, HL, who was recently seen by primary care provider and then by myself for atrial fibrillation. He was checking his blood pressure at home and he did notice that his blood pressure cuff shows irregular heart rate.  This prompted him asking his primary care provider about the regular heart rate and disease how his atrial fibrillation was discovered again he is completely symptomatic. He then came to cardiology clinic, was in sinus rhythm. Was started on low dose metoprolol and Ziopatch has been placed. Today he returns to follow up on these.  He offers no new complaints noted that he feels well, he does not feel palpitations or racing of the heart.  He is completely asymptomatic from the atrial fibrillation standpoint.  Denies any dizziness lightheadedness no falls or trauma.  He takes apixaban as scheduled and denies any complaints associated with including bleeding.  no other complaints.    PAST MEDICAL HISTORY:  Past Medical History:   Diagnosis Date     Acquired hypothyroidism 12/31/2018     Arthritis 2010     Essential hypertension, benign 2004     Macular degeneration 1980s     Paroxysmal atrial fibrillation (H) 12/31/2018     Pure hypercholesterolemia 2004    goal ldl <100     FAMILY HISTORY:  Family History   Problem Relation Age of Onset     Arthritis Mother      Eye Disorder Mother      Diabetes Mother         type 2     Hypertension Mother      Cancer - colorectal Mother      Cerebrovascular Disease Mother      Osteoporosis Mother      Obesity Mother      Colon Cancer Mother      Heart Disease Father      Prostate Cancer Father      Cerebrovascular Disease Father      Heart Disease Brother      Prostate Cancer Brother      Coronary Artery Disease Brother      Gastrointestinal Disease Son      SOCIAL HISTORY:  Social History     Socioeconomic History     Marital status:      Spouse name: Meredith     Number  [facility]   Physical Therapy Treatment Note     Name: Trinity Peres  [facility] Number: [id number]    Therapy Diagnosis:   Encounter Diagnoses   Name Primary?    Gait abnormality Yes    Decreased range of motion of left knee     Decreased strength of lower extremity        Physician: Fabiola Pool, *    Visit Date: 8/10/2023    Physician: Fabiola Pool, *     Physician Orders: PT Eval and Treat  Medical Diagnosis from Referral:   Diagnosis   S83.512D (ICD-10-CM) - Rupture of anterior cruciate ligament of left knee, subsequent encounter   S83.242D (ICD-10-CM) - Acute medial meniscus tear, left, subsequent encounter      Evaluation Date: 6/13/2023  Authorization Period Expiration: 09/12/2023  Plan of Care Expiration: 12/31/2023  Progress Note Due: 7/11/2023   Visit # / Visits authorized: 15/20 (+ eval)  FOTO #1 given at eval     Time In: 0900 am  Time Out: 1000 am  Total Billable Time: 60 minutes - medicaid     DOS: 6/12/23  S/p: L ACLr  PROCEDURE:    Arthroscopic assisted ACL reconstruction using bone patellar tendon bone autograft  Bone grafting left tibia and patella  Post-Op Precautions: TTWB for another week, then may increase to 50/50 at week 4, then 100 from week 5-6), ROM 0-90 until 4 weeks post op, then advance to 120     Precautions: Standard, Weightbearing, and post op ACLr      SUBJECTIVE     Pt reports: she has been working with her NMES unit at home  Response to previous treatment: improved quad activation  Functional change: no functional change    Pain: 3/10  Location: left knee      OBJECTIVE     8 weeks 3 days s/p     Objective Measures updated at progress report unless specified.     Gait: Bilateral crutches; step through pattern    Knee Passive Range of Motion:    Right  Left    Flexion 141 130   Extension Hyper 8 Hyper 8        Quad Set: good quality following NMES, partial hyperextension achieved        Joint Mobility: hypomobile patella in all directions  "and hypomobile infrapatellar fat pad       Treatment       Trinity received the treatments listed below:      THERAPEUTIC EXERCISES to develop strength, endurance, ROM, flexibility, posture, and core stabilization for 18 minutes including:   Heel prop; 5# above and below; 6 min   Hinge stretch; 10x 10"   Upright bike; 8 min Lv 4.0 for aerobic activity  Prone quad stretch; 3 x 20"       MANUAL THERAPY TECHNIQUES including Joint mobilizations and Soft tissue Mobilization were applied to left knee for 12 minutes.  Fat pad mob at 0 and 2o deg   Patellar mob Gr 3-4 in all directions  Seat EOT passive flexion with inf patellar glide 2 min      NEUROMUSCULAR RE-EDUCATION ACTIVITIES to improve Balance, Coordination, Kinesthetic, Sense, Proprioception, and Posture for  30 minutes.  The following were included:   Quad set w/ AAROM strap 3 min w/ NMES 10" on 20" off  BFR 80% OKC; 70% CKC 30/15/15/15   - SLR    - LAQ; BTB    NP Today:  DL STS wt 20" box; 3 x 10  Wall sit: 3 x 20" at 30-40 deg flex  SLS 4 x 20" hold  Shuttle SL Leg Press; 1 black band      THERAPEUTIC ACTIVITIES to improve dynamic and functional performance for 00 minutes including.      GAIT TRAINING to improve functional mobility and safety for 00 minutes.       Patient Education and Home Exercises     Home Exercises Provided and Patient Education Provided     Education provided:   - prognosis  - diagnosis  - continued HEP  - s/p precautions: DVT, infection, bandaging    Written Home Exercises Provided: yes. Exercises were reviewed and Trinity was able to demonstrate them prior to the end of the session.  Trinity demonstrated good  understanding of the education provided. See EMR under Patient Instructions for exercises provided during therapy sessions    ASSESSMENT   Trinity presented lacking terminal knee extension and required increased manual interventions and LLLD stretching to improve. She is continuing to demonstrate deficit in quad control for  active " of children: 4     Years of education: Not on file     Highest education level: Not on file   Occupational History     Occupation:      Employer: RETIRED   Social Needs     Financial resource strain: Not on file     Food insecurity:     Worry: Not on file     Inability: Not on file     Transportation needs:     Medical: Not on file     Non-medical: Not on file   Tobacco Use     Smoking status: Never Smoker     Smokeless tobacco: Former User     Types: Chew     Tobacco comment: Chewed tobacco for 30 years   Substance and Sexual Activity     Alcohol use: Yes     Comment: 1 or less drink per day     Drug use: No     Sexual activity: No     Partners: Female   Lifestyle     Physical activity:     Days per week: Not on file     Minutes per session: Not on file     Stress: Not on file   Relationships     Social connections:     Talks on phone: Not on file     Gets together: Not on file     Attends Shinto service: Not on file     Active member of club or organization: Not on file     Attends meetings of clubs or organizations: Not on file     Relationship status: Not on file     Intimate partner violence:     Fear of current or ex partner: Not on file     Emotionally abused: Not on file     Physically abused: Not on file     Forced sexual activity: Not on file   Other Topics Concern     Parent/sibling w/ CABG, MI or angioplasty before 65F 55M? No      Service No     Blood Transfusions No     Caffeine Concern No     Occupational Exposure No     Hobby Hazards No     Sleep Concern No     Stress Concern No     Weight Concern No     Special Diet No     Back Care No     Exercise No     Bike Helmet No     Seat Belt Yes     Self-Exams Yes   Social History Narrative     Not on file     CURRENT MEDICATIONS:  Current Outpatient Medications   Medication     apixaban ANTICOAGULANT (ELIQUIS) 5 MG tablet     Ferrous Sulfate (IRON SUPPLEMENT PO)     levothyroxine (SYNTHROID/LEVOTHROID) 150 MCG tablet     losartan  (COZAAR) 100 MG tablet     lovastatin (MEVACOR) 40 MG tablet     LUTEIN 6 MG CAPS     metoprolol tartrate (LOPRESSOR) 50 MG tablet     MULTI-VITAMIN OR     OMEGA 3 1000 MG OR CAPS     Psyllium (METAMUCIL PO)     vitamin B-12 (CYANOCOBALAMIN) 500 MCG tablet     No current facility-administered medications for this visit.       ROS:   Constitutional: No fever, chills, or sweats.  ENT: No visual disturbance, ear ache, epistaxis, sore throat.   Allergies/Immunologic: Negative.   Respiratory: No cough, hemoptysis.   Cardiovascular: As per HPI.   GI: No nausea, vomiting, hematemesis, melena, or hematochezia.   : No urinary frequency, dysuria, or hematuria.   Integrument: Negative.   Psychiatric: No evidence of major depression  Neuro: No new neurological complaints at this time. Non focal  Endocrinology: Negative.   Musculoskeletal: As per HPI.      EXAM:  /77 (BP Location: Left arm, Patient Position: Sitting, Cuff Size: Adult Large)   Pulse 72   Wt 113.4 kg (250 lb)   SpO2 98%   BMI 34.62 kg/m    General: appears comfortable, alert and oriented  Head: normocephalic, atraumatic  Eyes: anicteric sclera, EOMI , PERRL  Neck: no adenopathy  Orophyarynx: moist mucosa, no lesions noted  Heart: regular, S1/S2, no murmurs, rubs or gallop. Estimated JVP at 5 cmH2O  Lungs: CTAB, No wheezing.   Abdomen: soft, non-tender, bowel sounds present, no hepatosplenomegaly  Extremities: No LE edema today  Skin: no open lesions noted  Neuro: grossly non-focal  Psych: no evidence of depression noted     Labs:  Lab Results   Component Value Date    WBC 4.5 12/11/2018    HGB 12.4 (L) 12/11/2018    HCT 36.4 (L) 12/11/2018     12/11/2018     01/29/2019    POTASSIUM 4.2 01/29/2019    CHLORIDE 106 01/29/2019    CO2 26 01/29/2019    BUN 16 01/29/2019    CR 1.38 (H) 01/29/2019    GLC 90 01/29/2019    AST 38 08/26/2009    ALT 21 09/28/2011    INR 1.0 06/04/2015     Echocardiogram reviewed 12/2018:  Global and regional left  hyperextension which is behind at this stage of rehab, however, is progressing since last week. Will continue to emphasize functional strength and quad control.     Trinity Is progressing well towards her goals.   Pt prognosis is Good.     Pt will continue to benefit from skilled outpatient physical therapy to address the deficits listed in the problem list box on initial evaluation, provide pt/family education and to maximize pt's level of independence in the home and community environment.     Pt's spiritual, cultural and educational needs considered and pt agreeable to plan of care and goals.     Anticipated barriers to physical therapy: none    Goals:  Short Term Goals: 8-10 weeks (Progressing, not met)  1. Pt will be compliant with HEP 50% of prescribed amount.  2. The pt to demo improvement in L knee ROM to equal R knee ROM pain free   3.  The pt to demo good quad set with proper hyperextension moment of the L knee   4. The pt to demo ambualting with elast restricitve AD witout major compensatory pattern L knee for at least 20 feet      Long Term Goals: 6 months  (Progressing, not met)   Pt will be compliant with % of prescribed amount.   Patient will improve their FOTO limitation score to at least 17% as evidence of clinically significant improvements in their function.  The pt to demo pain free and uncompensated walking mechanics x10 min on an indoor treadmill  The pt to demo tolerance to a squat at or bellow parallel with uncompensated mechanics x10   The pt to demo strength of L LE within 10% of R LE as demo'd on the biodex machine   The pt will report full participation in ADLs and IADLs without restrictions related to L knee.    PLAN     Progress per POC    Jessica Kevin, PT, DPT           ventricular function is normal with an EF of 55-60%.  The right ventricle is normal size. Global right ventricular function is  Normal. Mild aortic valve sclerosis is present. The proximal Asc Aorta measures 4.2cm in diameter. The aortic root index is 1.8cm/m2 (Normal index for males is 1.4-1.7cm/m2) No pericardial effusion is present.     Ziopatch 3/2019:   62% a-fib burden, mostly rate controlled.     ASSESSMENT AND PLAN:  In summary, patient is a 82 year old history of hypertension hyperlipidemia and paroxysmal atrial fibrillation on apixaban who presented to the cardiology clinic to follow up on recent zio monitoring results. Echocardiogram showed normal ejection fraction and he does not have any symptoms pertinent to heart failure.  His ChadsVasc score is 3 given his age and hypertension this will put him at 3.2% annual risk of stroke with atrial fibrillation.  He will continue his apixaban at this point given his paroxysmal episodes of atrial fibrillation.  I agree with apixaban 5 mg twice daily dosing however we have to monitor his renal function intermittently as apixaban dose should be reduced if 2 of the following criteria met 1) age above 80 years old, 2) weight less than 60 kg which is not, 3) creatinine about 1.5.  So if his creatinine reaches 1.5 or higher I would decrease his apixaban dose to 2.5 mg twice daily dosing.  Will increase metoprolol to 75 mg twice daily dosing continue his antihypertensives. Will escalate metoprolol over the next couple of weeks as able. Will need ongoing management for his hypothyroidism.     Follow up:   3-month     I appreciate the opportunity to participate in the care of Sami Harmon . Please do not hesitate to contact me with any further questions.     Sincerely,   Benji Cornelius MD     [facility] Division of Cardiology

## 2023-08-15 ENCOUNTER — CLINICAL SUPPORT (OUTPATIENT)
Dept: REHABILITATION | Facility: HOSPITAL | Age: 28
End: 2023-08-15
Payer: MEDICAID

## 2023-08-15 DIAGNOSIS — M25.662 DECREASED RANGE OF MOTION OF LEFT KNEE: ICD-10-CM

## 2023-08-15 DIAGNOSIS — R26.9 GAIT ABNORMALITY: Primary | ICD-10-CM

## 2023-08-15 DIAGNOSIS — R29.898 DECREASED STRENGTH OF LOWER EXTREMITY: ICD-10-CM

## 2023-08-15 PROCEDURE — 97110 THERAPEUTIC EXERCISES: CPT

## 2023-08-15 PROCEDURE — 97140 MANUAL THERAPY 1/> REGIONS: CPT

## 2023-08-15 PROCEDURE — 97112 NEUROMUSCULAR REEDUCATION: CPT

## 2023-08-15 NOTE — PROGRESS NOTES
[facility]   Physical Therapy Treatment Note     Name: Trinity Peres  [facility] Number: [id number]    Therapy Diagnosis:   Encounter Diagnoses   Name Primary?    Gait abnormality Yes    Decreased range of motion of left knee     Decreased strength of lower extremity      Physician: Fabiola Pool, *    Visit Date: 8/15/2023    Physician: Fabiola Pool, *     Physician Orders: PT Eval and Treat  Medical Diagnosis from Referral:   Diagnosis   S83.512D (ICD-10-CM) - Rupture of anterior cruciate ligament of left knee, subsequent encounter   S83.242D (ICD-10-CM) - Acute medial meniscus tear, left, subsequent encounter      Evaluation Date: 6/13/2023  Authorization Period Expiration: 09/12/2023  Plan of Care Expiration: 12/31/2023  Progress Note Due: 7/11/2023   Visit # / Visits authorized: 16/20 (+ eval)  FOTO #1 given at eval     Time In: 1000 am  Time Out: 1100 am  Total Billable Time: 60 minutes - medicaid     DOS: 6/12/23  S/p: L ACLr  PROCEDURE:    Arthroscopic assisted ACL reconstruction using bone patellar tendon bone autograft  Bone grafting left tibia and patella  Post-Op Precautions: TTWB for another week, then may increase to 50/50 at week 4, then 100 from week 5-6), ROM 0-90 until 4 weeks post op, then advance to 120     Precautions: Standard, Weightbearing, and post op ACLr      SUBJECTIVE     Pt reports: she feels like she's gotten better at her hyperextension after working a ton with her NMES unit  Response to previous treatment: improved quad activation  Functional change: no functional change    Pain: 3/10  Location: left knee      OBJECTIVE     9 weeks 1 days s/p     Objective Measures updated at progress report unless specified.     Gait: Bilateral crutches; step through pattern    Knee Passive Range of Motion:    Right  Left    Flexion 141 130   Extension Hyper 8 Hyper 8        Quad Set: good control of hyperextension         Joint Mobility: hypomobile patella  "in all directions and hypomobile infrapatellar fat pad       Treatment       Trinity received the treatments listed below:      THERAPEUTIC EXERCISES to develop strength, endurance, ROM, flexibility, posture, and core stabilization for 03 minutes including:   Hinge stretch; 10x 10"   Prone quad stretch; 3 x 20"     NP Today:   Heel prop; 5# above and below; 6 min   Upright bike; 8 min Lv 4.0 for aerobic activity        MANUAL THERAPY TECHNIQUES including Joint mobilizations and Soft tissue Mobilization were applied to left knee for 12 minutes.  Fat pad mob at 0 and 2o deg   Patellar mob Gr 3-4 in all directions  Seat EOT passive flexion with inf patellar glide 2 min      NEUROMUSCULAR RE-EDUCATION ACTIVITIES to improve Balance, Coordination, Kinesthetic, Sense, Proprioception, and Posture for  30 minutes.  The following were included:   Quad set w/ AAROM strap 10x 5"   SLR; 2 x 15  Step up 4"; 3 x 12  SL Leg press; 20#; 3 x 10  LAQ BTB; 3 x 10 w/ 5" hold      THERAPEUTIC ACTIVITIES to improve dynamic and functional performance for 00 minutes including.      GAIT TRAINING to improve functional mobility and safety for 00 minutes.       Patient Education and Home Exercises     Home Exercises Provided and Patient Education Provided     Education provided:   - prognosis  - diagnosis  - continued HEP  - s/p precautions: DVT, infection, bandaging    Written Home Exercises Provided: yes. Exercises were reviewed and Trinity was able to demonstrate them prior to the end of the session.  Trinity demonstrated good  understanding of the education provided. See EMR under Patient Instructions for exercises provided during therapy sessions    ASSESSMENT   Trinity tolerated added CKC interventions well with noted fasciculations. She did well to control valgus when cued for it. She was instructed to transition to single axillary crutch as long as quad control remains. Will continue to progress loading.     Trinity Is progressing well " towards her goals.   Pt prognosis is Good.     Pt will continue to benefit from skilled outpatient physical therapy to address the deficits listed in the problem list box on initial evaluation, provide pt/family education and to maximize pt's level of independence in the home and community environment.     Pt's spiritual, cultural and educational needs considered and pt agreeable to plan of care and goals.     Anticipated barriers to physical therapy: none    Goals:  Short Term Goals: 8-10 weeks (Progressing, not met)  1. Pt will be compliant with HEP 50% of prescribed amount.  2. The pt to demo improvement in L knee ROM to equal R knee ROM pain free   3.  The pt to demo good quad set with proper hyperextension moment of the L knee   4. The pt to demo ambualting with elast restricitve AD witout major compensatory pattern L knee for at least 20 feet      Long Term Goals: 6 months  (Progressing, not met)   Pt will be compliant with % of prescribed amount.   Patient will improve their FOTO limitation score to at least 17% as evidence of clinically significant improvements in their function.  The pt to demo pain free and uncompensated walking mechanics x10 min on an indoor treadmill  The pt to demo tolerance to a squat at or bellow parallel with uncompensated mechanics x10   The pt to demo strength of L LE within 10% of R LE as demo'd on the biodex machine   The pt will report full participation in ADLs and IADLs without restrictions related to L knee.    PLAN     Progress per POC    Jessica Kevin, PT, DPT

## 2023-08-17 ENCOUNTER — CLINICAL SUPPORT (OUTPATIENT)
Dept: REHABILITATION | Facility: HOSPITAL | Age: 28
End: 2023-08-17
Payer: MEDICAID

## 2023-08-17 DIAGNOSIS — M25.662 DECREASED RANGE OF MOTION OF LEFT KNEE: ICD-10-CM

## 2023-08-17 DIAGNOSIS — R29.898 DECREASED STRENGTH OF LOWER EXTREMITY: ICD-10-CM

## 2023-08-17 DIAGNOSIS — R26.9 GAIT ABNORMALITY: Primary | ICD-10-CM

## 2023-08-17 PROCEDURE — 97110 THERAPEUTIC EXERCISES: CPT

## 2023-08-17 NOTE — PROGRESS NOTES
[facility]   Physical Therapy Treatment Note     Name: Trinity Peres  [facility] Number: [id number]    Therapy Diagnosis:   Encounter Diagnoses   Name Primary?    Gait abnormality Yes    Decreased range of motion of left knee     Decreased strength of lower extremity      Physician: Fabiola Pool, *    Visit Date: 8/17/2023    Physician: Fabiola Pool, *     Physician Orders: PT Eval and Treat  Medical Diagnosis from Referral:   Diagnosis   S83.512D (ICD-10-CM) - Rupture of anterior cruciate ligament of left knee, subsequent encounter   S83.242D (ICD-10-CM) - Acute medial meniscus tear, left, subsequent encounter      Evaluation Date: 6/13/2023  Authorization Period Expiration: 09/12/2023  Plan of Care Expiration: 12/31/2023  Progress Note Due: 7/11/2023   Visit # / Visits authorized: 17/20 (+ eval)  FOTO #1 given at eval     Time In: 1000 am  Time Out: 1100 am  Total Billable Time: 60 minutes - medicaid     DOS: 6/12/23  S/p: L ACLr  PROCEDURE:    Arthroscopic assisted ACL reconstruction using bone patellar tendon bone autograft  Bone grafting left tibia and patella  Post-Op Precautions: TTWB for another week, then may increase to 50/50 at week 4, then 100 from week 5-6), ROM 0-90 until 4 weeks post op, then advance to 120     Precautions: Standard, Weightbearing, and post op ACLr      SUBJECTIVE     Pt reports: she feels like she's gotten better at her hyperextension after working a ton with her NMES unit  Response to previous treatment: improved quad activation  Functional change: no functional change    Pain: 3/10  Location: left knee      OBJECTIVE     9 weeks 3 days s/p     Objective Measures updated at progress report unless specified.     Gait: ambulates with brace unlocked and no crutches, good     Knee Passive Range of Motion:    Right  Left    Flexion 141 140   Extension Hyper 8 Hyper 8        Quad Set: good control of hyperextension         Joint Mobility:  "hypomobile patella in all directions and hypomobile infrapatellar fat pad       Treatment       Trinity received the treatments listed below:      THERAPEUTIC EXERCISES to develop strength, endurance, ROM, flexibility, posture, and core stabilization for 05 minutes including:   Hinge stretch; 10x 10"   Prone quad stretch; 3 x 20"     NP Today:   Heel prop; 5# above and below; 6 min   Upright bike; 8 min Lv 4.0 for aerobic activity        MANUAL THERAPY TECHNIQUES including Joint mobilizations and Soft tissue Mobilization were applied to left knee for 15 minutes.  Fat pad mob at 0 and 2o deg   Patellar mob Gr 3-4 in all directions  Seat EOT passive flexion with inf patellar glide 2 min      NEUROMUSCULAR RE-EDUCATION ACTIVITIES to improve Balance, Coordination, Kinesthetic, Sense, Proprioception, and Posture for  40 minutes.  The following were included:   Quad set w/ AAROM strap 10x 5"   Quad set into hyper w/ mTrigger biofeedback; 5 min   SLR; 2 x 15 w/ mTrigger biofeedback; 3 x 10  Hammer strength knee ext; 40-90; 2.5# 3 x 10 w/ 5" hold w/ mTrigger biofeedback  DL HS curl 25#; 3 x 15  SL Leg press; 40#; 3 x 12    Step up 4"; 3 x 12 w/ mTrigger biofeedback- NP    THERAPEUTIC ACTIVITIES to improve dynamic and functional performance for 00 minutes including.      GAIT TRAINING to improve functional mobility and safety for 00 minutes.       Patient Education and Home Exercises     Home Exercises Provided and Patient Education Provided     Education provided:   - prognosis  - diagnosis  - continued HEP  - s/p precautions: DVT, infection, bandaging    Written Home Exercises Provided: yes. Exercises were reviewed and Trinity was able to demonstrate them prior to the end of the session.  Trinity demonstrated good  understanding of the education provided. See EMR under Patient Instructions for exercises provided during therapy sessions    ASSESSMENT   Trinity tolerated progression of CKC strengthening well. She continues " to demonstrate some difficulty with controlling femoral IR during OKC and CKC but this improved with biofeedback for quad activaition. Will continue to progress as able     Trinity Is progressing well towards her goals.   Pt prognosis is Good.     Pt will continue to benefit from skilled outpatient physical therapy to address the deficits listed in the problem list box on initial evaluation, provide pt/family education and to maximize pt's level of independence in the home and community environment.     Pt's spiritual, cultural and educational needs considered and pt agreeable to plan of care and goals.     Anticipated barriers to physical therapy: none    Goals:  Short Term Goals: 8-10 weeks (Progressing, not met)  1. Pt will be compliant with HEP 50% of prescribed amount.  2. The pt to demo improvement in L knee ROM to equal R knee ROM pain free   3.  The pt to demo good quad set with proper hyperextension moment of the L knee   4. The pt to demo ambualting with elast restricitve AD witout major compensatory pattern L knee for at least 20 feet      Long Term Goals: 6 months  (Progressing, not met)   Pt will be compliant with % of prescribed amount.   Patient will improve their FOTO limitation score to at least 17% as evidence of clinically significant improvements in their function.  The pt to demo pain free and uncompensated walking mechanics x10 min on an indoor treadmill  The pt to demo tolerance to a squat at or bellow parallel with uncompensated mechanics x10   The pt to demo strength of L LE within 10% of R LE as demo'd on the biodex machine   The pt will report full participation in ADLs and IADLs without restrictions related to L knee.    PLAN     Progress per POC    Jessica Kevin, PT, DPT

## 2023-08-22 ENCOUNTER — CLINICAL SUPPORT (OUTPATIENT)
Dept: REHABILITATION | Facility: HOSPITAL | Age: 28
End: 2023-08-22
Payer: MEDICAID

## 2023-08-22 DIAGNOSIS — R26.9 GAIT ABNORMALITY: Primary | ICD-10-CM

## 2023-08-22 DIAGNOSIS — M25.662 DECREASED RANGE OF MOTION OF LEFT KNEE: ICD-10-CM

## 2023-08-22 DIAGNOSIS — R29.898 DECREASED STRENGTH OF LOWER EXTREMITY: ICD-10-CM

## 2023-08-22 PROCEDURE — 97112 NEUROMUSCULAR REEDUCATION: CPT

## 2023-08-22 PROCEDURE — 97140 MANUAL THERAPY 1/> REGIONS: CPT

## 2023-08-22 NOTE — PROGRESS NOTES
[facility]   Physical Therapy Treatment Note     Name: Trinity Peres  [facility] Number: [id number]    Therapy Diagnosis:   Encounter Diagnoses   Name Primary?    Gait abnormality Yes    Decreased range of motion of left knee     Decreased strength of lower extremity        Physician: Fabiola Pool, *    Visit Date: 8/22/2023    Physician: Fabiola Pool, *     Physician Orders: PT Eval and Treat  Medical Diagnosis from Referral:   Diagnosis   S83.512D (ICD-10-CM) - Rupture of anterior cruciate ligament of left knee, subsequent encounter   S83.242D (ICD-10-CM) - Acute medial meniscus tear, left, subsequent encounter      Evaluation Date: 6/13/2023  Authorization Period Expiration: 09/12/2023  Plan of Care Expiration: 12/31/2023  Progress Note Due: 7/11/2023   Visit # / Visits authorized: 18/20 (+ eval)  FOTO #1 given at eval     Time In: 1000 am  Time Out: 1100 am  Total Billable Time: 60 minutes - medicaid     DOS: 6/12/23  S/p: L ACLr  PROCEDURE:    Arthroscopic assisted ACL reconstruction using bone patellar tendon bone autograft  Bone grafting left tibia and patella  Post-Op Precautions: TTWB for another week, then may increase to 50/50 at week 4, then 100 from week 5-6), ROM 0-90 until 4 weeks post op, then advance to 120     Precautions: Standard, Weightbearing, and post op ACLr      SUBJECTIVE     Pt reports: she feels good and is getting frustrated with her brace  Response to previous treatment: improved quad activation  Functional change: no functional change    Pain: 3/10  Location: left knee      OBJECTIVE   10 weeks 1 days s/p     Objective Measures updated at progress report unless specified.     Gait: ambulates with brace unlocked and no crutches, good     Knee Passive Range of Motion:    Right  Left    Flexion 141 140   Extension Hyper 8 Hyper 8        Quad Set: good control of hyperextension         Joint Mobility: hypomobile patella in all directions and  "hypomobile infrapatellar fat pad       Treatment       Trinity received the treatments listed below:      THERAPEUTIC EXERCISES to develop strength, endurance, ROM, flexibility, posture, and core stabilization for 00 minutes including:     NP Today:   Heel prop; 5# above and below; 6 min   Upright bike; 8 min Lv 4.0 for aerobic activity  Hinge stretch; 10x 10"   Prone quad stretch; 3 x 20"     MANUAL THERAPY TECHNIQUES including Joint mobilizations and Soft tissue Mobilization were applied to left knee for 10 minutes.  Fat pad mob at 0 and 2o deg   Patellar mob Gr 3-4 in all directions  Seat EOT passive flexion with inf patellar glide 2 min      NEUROMUSCULAR RE-EDUCATION ACTIVITIES to improve Balance, Coordination, Kinesthetic, Sense, Proprioception, and Posture for  50 minutes.  The following were included:   Quad set w/ AAROM strap 10x 5"   BFR 80% occulusion OCK, 70% occulusion CKC 30/15/15/15   - SLR   - Prone HS curl YTB   - LAQ BTB  Sit to stand; BTB 2 x 10  Partial Squat iso hold; 3 x 5 w/ 5" hold  DL bridge; BTB; 3 x 10 w/ 5" hol     NP Today:  Quad set into hyper w/ mTrigger biofeedback; 5 min   SLR; 2 x 15 w/ mTrigger biofeedback; 3 x 10  Hammer strength knee ext; 40-90; 2.5# 3 x 10 w/ 5" hold w/ mTrigger biofeedback  DL HS curl 25#; 3 x 15  SL Leg press; 40#; 3 x 12  Step up 4"; 3 x 12 w/ mTrigger biofeedback- NP    THERAPEUTIC ACTIVITIES to improve dynamic and functional performance for 00 minutes including.      GAIT TRAINING to improve functional mobility and safety for 00 minutes.       Patient Education and Home Exercises     Home Exercises Provided and Patient Education Provided     Education provided:   - prognosis  - diagnosis  - continued HEP  - s/p precautions: DVT, infection, bandaging    Written Home Exercises Provided: yes. Exercises were reviewed and Trinity was able to demonstrate them prior to the end of the session.  Angelabrian demonstrated good  understanding of the education provided. See " EMR under Patient Instructions for exercises provided during therapy sessions    ASSESSMENT   Trinity is progressing in tolerance to loading and responded well to BFR today. She will benefit continued loading and with biofeedback to avoid quad avoidance. Next session will return to direct loading for strengthening and functional improvement.     Trinity Is progressing well towards her goals.   Pt prognosis is Good.     Pt will continue to benefit from skilled outpatient physical therapy to address the deficits listed in the problem list box on initial evaluation, provide pt/family education and to maximize pt's level of independence in the home and community environment.     Pt's spiritual, cultural and educational needs considered and pt agreeable to plan of care and goals.     Anticipated barriers to physical therapy: none    Goals:  Short Term Goals: 8-10 weeks (Progressing, not met)  1. Pt will be compliant with HEP 50% of prescribed amount.  2. The pt to demo improvement in L knee ROM to equal R knee ROM pain free   3.  The pt to demo good quad set with proper hyperextension moment of the L knee   4. The pt to demo ambualting with elast restricitve AD witout major compensatory pattern L knee for at least 20 feet      Long Term Goals: 6 months  (Progressing, not met)   Pt will be compliant with % of prescribed amount.   Patient will improve their FOTO limitation score to at least 17% as evidence of clinically significant improvements in their function.  The pt to demo pain free and uncompensated walking mechanics x10 min on an indoor treadmill  The pt to demo tolerance to a squat at or bellow parallel with uncompensated mechanics x10   The pt to demo strength of L LE within 10% of R LE as demo'd on the biodex machine   The pt will report full participation in ADLs and IADLs without restrictions related to L knee.    PLAN     Progress per POC    Jessica Kevin, PT, DPT

## 2023-08-24 ENCOUNTER — CLINICAL SUPPORT (OUTPATIENT)
Dept: REHABILITATION | Facility: HOSPITAL | Age: 28
End: 2023-08-24
Payer: MEDICAID

## 2023-08-24 DIAGNOSIS — R26.9 GAIT ABNORMALITY: Primary | ICD-10-CM

## 2023-08-24 DIAGNOSIS — M25.662 DECREASED RANGE OF MOTION OF LEFT KNEE: ICD-10-CM

## 2023-08-24 DIAGNOSIS — R29.898 DECREASED STRENGTH OF LOWER EXTREMITY: ICD-10-CM

## 2023-08-24 PROCEDURE — 97110 THERAPEUTIC EXERCISES: CPT

## 2023-08-24 NOTE — PROGRESS NOTES
[facility]   Physical Therapy Treatment Note     Name: Trinity Peres  [facility] Number: [id number]    Therapy Diagnosis:   Encounter Diagnoses   Name Primary?    Gait abnormality Yes    Decreased range of motion of left knee     Decreased strength of lower extremity        Physician: Fabiola Pool, *    Visit Date: 8/24/2023    Physician: Fabiola Pool, *     Physician Orders: PT Eval and Treat  Medical Diagnosis from Referral:   Diagnosis   S83.512D (ICD-10-CM) - Rupture of anterior cruciate ligament of left knee, subsequent encounter   S83.242D (ICD-10-CM) - Acute medial meniscus tear, left, subsequent encounter      Evaluation Date: 6/13/2023  Authorization Period Expiration: 09/12/2023  Plan of Care Expiration: 12/31/2023  Progress Note Due: 7/11/2023   Visit # / Visits authorized: [id number] (+ eval)  FOTO #1 given at eval     Time In: 0900 am  Time Out: 0800 am  Total Billable Time: 60 minutes - medicaid     DOS: 6/12/23  S/p: L ACLr  PROCEDURE:    Arthroscopic assisted ACL reconstruction using bone patellar tendon bone autograft  Bone grafting left tibia and patella  Post-Op Precautions: TTWB for another week, then may increase to 50/50 at week 4, then 100 from week 5-6), ROM 0-90 until 4 weeks post op, then advance to 120     Precautions: Standard, Weightbearing, and post op ACLr      SUBJECTIVE     Pt reports: she feels good and is getting frustrated with her brace  Response to previous treatment: improved quad activation  Functional change: no functional change    Pain: 3/10  Location: left knee      OBJECTIVE   10 weeks 3 days s/p     Objective Measures updated at progress report unless specified.     Gait: ambulates with brace unlocked and no crutches, good     Knee Passive Range of Motion:    Right  Left    Flexion 141 140   Extension Hyper 8 Hyper 8        Quad Set: good control of hyperextension         Joint Mobility: hypomobile patella in all directions and  "hypomobile infrapatellar fat pad       Knee Extension Iso Right Left Affected Limb % symmetry % deficit   Crane scale 60 deg flex  53.2 kg  46.7 kg  48.9 kg 14.9 kg  15.3 kg  17.1 kg*  R 32% 68%         Treatment       Trinity received the treatments listed below:      THERAPEUTIC EXERCISES to develop strength, endurance, ROM, flexibility, posture, and core stabilization for 15 minutes including:   Heel prop; 5# above and below; 6 min   Hinge stretch; 10x 10"   Prone quad stretch; 3 x 20"     NP Today:   Upright bike; 8 min Lv 4.0 for aerobic activity      MANUAL THERAPY TECHNIQUES including Joint mobilizations and Soft tissue Mobilization were applied to left knee for 15 minutes.  Fat pad mob at 0 and 2o deg   Patellar mob Gr 3-4 in all directions  Seat EOT passive flexion with inf patellar glide 2 min      NEUROMUSCULAR RE-EDUCATION ACTIVITIES to improve Balance, Coordination, Kinesthetic, Sense, Proprioception, and Posture for  10 minutes.  The following were included:   Quad set w/ AAROM strap 10x 5"   SLR; 2 x 10; 3 x 10      NP Today:  Partial Squat iso hold; 3 x 5 w/ 5" hold  DL bridge; BTB; 3 x 10 w/ 5" hol   DL HS curl 25#; 3 x 15  SL Leg press; 40#; 3 x 12  Step up 4"; 3 x 12 w/ mTrigger biofeedback- NP  BFR 80% occulusion OCK, 70% occulusion CKC 30/15/15/15   - SLR   - Prone HS curl YTB   - LAQ BTB    THERAPEUTIC ACTIVITIES to improve dynamic and functional performance for 20 minutes including.  Hammer strength knee ext; 40-90; 5#; 4 x 8 w/ 3" hold  Sit to stand; BTB 2 x 10  Strength assessment    GAIT TRAINING to improve functional mobility and safety for 00 minutes.       Patient Education and Home Exercises     Home Exercises Provided and Patient Education Provided     Education provided:   - prognosis  - diagnosis  - continued HEP  - s/p precautions: DVT, infection, bandaging    Written Home Exercises Provided: yes. Exercises were reviewed and Trinity was able to demonstrate them prior to the end of " the session.  Trinity demonstrated good  understanding of the education provided. See EMR under Patient Instructions for exercises provided during therapy sessions    ASSESSMENT   See updated POC in treatment section     Trinity Is progressing well towards her goals.   Pt prognosis is Good.     Pt will continue to benefit from skilled outpatient physical therapy to address the deficits listed in the problem list box on initial evaluation, provide pt/family education and to maximize pt's level of independence in the home and community environment.     Pt's spiritual, cultural and educational needs considered and pt agreeable to plan of care and goals.     Anticipated barriers to physical therapy: none    Goals:  Short Term Goals: 8-10 weeks MET  1. Pt will be compliant with HEP 50% of prescribed amount.  2. The pt to demo improvement in L knee ROM to equal R knee ROM pain free   3.  The pt to demo good quad set with proper hyperextension moment of the L knee   4. The pt to demo ambualting with elast restricitve AD witout major compensatory pattern L knee for at least 20 feet      Long Term Goals: 6 months  (Progressing, not met)   Pt will be compliant with % of prescribed amount.   Patient will improve their FOTO limitation score to at least 17% as evidence of clinically significant improvements in their function.  The pt to demo pain free and uncompensated walking mechanics x10 min on an indoor treadmill  The pt to demo tolerance to a squat at or bellow parallel with uncompensated mechanics x10   The pt to demo strength of L LE within 10% of R LE as demo'd on the biodex machine   The pt will report full participation in ADLs and IADLs without restrictions related to L knee.    PLAN     Progress per POC    Jessica Kevin, PT, DPT

## 2023-08-28 ENCOUNTER — CLINICAL SUPPORT (OUTPATIENT)
Dept: REHABILITATION | Facility: HOSPITAL | Age: 28
End: 2023-08-28
Payer: MEDICAID

## 2023-08-28 DIAGNOSIS — R26.9 GAIT ABNORMALITY: Primary | ICD-10-CM

## 2023-08-28 DIAGNOSIS — M25.662 DECREASED RANGE OF MOTION OF LEFT KNEE: ICD-10-CM

## 2023-08-28 DIAGNOSIS — R29.898 DECREASED STRENGTH OF LOWER EXTREMITY: ICD-10-CM

## 2023-08-28 PROCEDURE — 97110 THERAPEUTIC EXERCISES: CPT

## 2023-08-28 NOTE — PROGRESS NOTES
[facility]   Physical Therapy Treatment Note     Name: Trinity Peres  [facility] Number: [id number]    Therapy Diagnosis:   Encounter Diagnoses   Name Primary?    Gait abnormality Yes    Decreased range of motion of left knee     Decreased strength of lower extremity        Physician: Fabiola Pool, *    Visit Date: 8/28/2023    Physician: Fabiola Pool, *     Physician Orders: PT Eval and Treat  Medical Diagnosis from Referral:   Diagnosis   S83.512D (ICD-10-CM) - Rupture of anterior cruciate ligament of left knee, subsequent encounter   S83.242D (ICD-10-CM) - Acute medial meniscus tear, left, subsequent encounter      Evaluation Date: 6/13/2023  Authorization Period Expiration: 09/12/2023  Plan of Care Expiration: 12/31/2023  Progress Note Due: 7/11/2023   Visit # / Visits authorized: [id number] (+ eval)  FOTO #1 given at eval     Time In: 1000 am  Time Out: 1100 am  Total Billable Time: 60 minutes - medicaid     DOS: 6/12/23  S/p: L ACLr  PROCEDURE:    Arthroscopic assisted ACL reconstruction using bone patellar tendon bone autograft  Bone grafting left tibia and patella  Post-Op Precautions: TTWB for another week, then may increase to 50/50 at week 4, then 100 from week 5-6), ROM 0-90 until 4 weeks post op, then advance to 120     Precautions: Standard, Weightbearing, and post op ACLr      SUBJECTIVE     Pt reports: she feels good and is getting frustrated with her brace  Response to previous treatment: improved quad activation  Functional change: no functional change    Pain: 3/10  Location: left knee      OBJECTIVE   10 weeks 3 days s/p     Objective Measures updated at progress report unless specified.     Gait: ambulates with brace unlocked and no crutches, good     Knee Passive Range of Motion:    Right  Left    Flexion 141 140   Extension Hyper 8 Hyper 8        Quad Set: good control of hyperextension         Joint Mobility: hypomobile patella in all directions and  "hypomobile infrapatellar fat pad       Knee Extension Iso Right Left Affected Limb % symmetry % deficit   Crane scale 60 deg flex  53.2 kg  46.7 kg  48.9 kg 14.9 kg  15.3 kg  17.1 kg*  R 32% 68%         Treatment       Trinity received the treatments listed below:      THERAPEUTIC EXERCISES to develop strength, endurance, ROM, flexibility, posture, and core stabilization for 10 minutes including:   Hinge stretch; 10x 10"   Prone quad stretch; 3 x 20"     NP Today:   Upright bike; 8 min Lv 4.0 for aerobic activity  Heel prop; 5# above and below; 6 min     MANUAL THERAPY TECHNIQUES including Joint mobilizations and Soft tissue Mobilization were applied to left knee for 15 minutes.  Fat pad mob at 0 and 2o deg   Patellar mob Gr 3-4 in all directions  Seat EOT passive flexion with inf patellar glide 2 min      NEUROMUSCULAR RE-EDUCATION ACTIVITIES to improve Balance, Coordination, Kinesthetic, Sense, Proprioception, and Posture for  20 minutes.  The following were included:   Quad set w/ AAROM strap 10x 5" - NMES  SLR; 3 x 10- NMES  TKE GTB; 5 min -heavy cues for quad control      NP Today:  Partial Squat iso hold; 3 x 5 w/ 5" hold  DL bridge; BTB; 3 x 10 w/ 5" hol   DL HS curl 25#; 3 x 15  SL Leg press; 40#; 3 x 12  Step up 4"; 3 x 12 w/ mTrigger biofeedback- NP  BFR 80% occulusion OCK, 70% occulusion CKC 30/15/15/15   - SLR   - Prone HS curl YTB   - LAQ BTB    THERAPEUTIC ACTIVITIES to improve dynamic and functional performance for 15 minutes including.  Hammer strength knee ext; 40-90; 5#; 4 x 10 w/ 5" hold  Sit to stand; BTB 2 x 10- NP      GAIT TRAINING to improve functional mobility and safety for 00 minutes.       Patient Education and Home Exercises     Home Exercises Provided and Patient Education Provided     Education provided:   - prognosis  - diagnosis  - continued HEP  - s/p precautions: DVT, infection, bandaging    Written Home Exercises Provided: yes. Exercises were reviewed and Trinity was able to " demonstrate them prior to the end of the session.  Trinity demonstrated good  understanding of the education provided. See EMR under Patient Instructions for exercises provided during therapy sessions    ASSESSMENT   Trinity presented lacking terminal knee extension actively or passively. She required regression to NMES for active hyperextension. Increased time spent on achieving quad activation in CKC. Will continue to progress as able.     Skilled rehab technician utilized during treatment    Trinity Is progressing well towards her goals.   Pt prognosis is Good.     Pt will continue to benefit from skilled outpatient physical therapy to address the deficits listed in the problem list box on initial evaluation, provide pt/family education and to maximize pt's level of independence in the home and community environment.     Pt's spiritual, cultural and educational needs considered and pt agreeable to plan of care and goals.     Anticipated barriers to physical therapy: none    Goals:  Short Term Goals: 8-10 weeks MET  1. Pt will be compliant with HEP 50% of prescribed amount.  2. The pt to demo improvement in L knee ROM to equal R knee ROM pain free   3.  The pt to demo good quad set with proper hyperextension moment of the L knee   4. The pt to demo ambualting with elast restricitve AD witout major compensatory pattern L knee for at least 20 feet      Long Term Goals: 6 months  (Progressing, not met)   Pt will be compliant with % of prescribed amount.   Patient will improve their FOTO limitation score to at least 17% as evidence of clinically significant improvements in their function.  The pt to demo pain free and uncompensated walking mechanics x10 min on an indoor treadmill  The pt to demo tolerance to a squat at or bellow parallel with uncompensated mechanics x10   The pt to demo strength of L LE within 10% of R LE as demo'd on the biodex machine   The pt will report full participation in ADLs and IADLs  without restrictions related to L knee.    PLAN     Progress per POC    Jessica Kevin, PT, DPT

## 2023-08-28 NOTE — PLAN OF CARE
Outpatient Therapy Updated Plan of Care     Visit Date: 8/24/2023  Name: Trinity Peres  [facility] Number: [id number]    Therapy Diagnosis:   Encounter Diagnoses   Name Primary?    Gait abnormality Yes    Decreased range of motion of left knee     Decreased strength of lower extremity      Physician: Fabiola Pool, *    Physician Orders: PT Eval and Treat  Medical Diagnosis from Referral:   Diagnosis   S83.512D (ICD-10-CM) - Rupture of anterior cruciate ligament of left knee, subsequent encounter   S83.242D (ICD-10-CM) - Acute medial meniscus tear, left, subsequent encounter      Evaluation Date: 6/13/2023  Authorization Period Expiration: 09/12/2023  Plan of Care Expiration: 12/31/2023  Progress Note Due: 7/11/2023   Visit # / Visits authorized: [id number] (+ eval)  FOTO #1 given at eval     Time In: 0900 am  Time Out: 0800 am  Total Billable Time: 60 minutes - medicaid     DOS: 6/12/23  S/p: L ACLr  PROCEDURE:    Arthroscopic assisted ACL reconstruction using bone patellar tendon bone autograft  Bone grafting left tibia and patella  Post-Op Precautions: TTWB for another week, then may increase to 50/50 at week 4, then 100 from week 5-6), ROM 0-90 until 4 weeks post op, then advance to 120     Precautions: Standard, Weightbearing, and post op ACLr    Subjective     Update: see daily treatment note for subjective    Objective     Update: Gait: ambulates with brace unlocked and no crutches, good     Knee Passive Range of Motion:    Right  Left    Flexion 141 140   Extension Hyper 8 Hyper 8        Quad Set: good control of hyperextension         Joint Mobility: hypomobile patella in all directions and hypomobile infrapatellar fat pad       Knee Extension Iso Right Left Affected Limb % symmetry % deficit   Crane scale 60 deg flex  53.2 kg  46.7 kg  48.9 kg 14.9 kg  15.3 kg  17.1 kg*  R 32% 68%       Assessment     Update: Trinity is improving well with gait and functional mobility. She demonstrates significant  quadriceps weakness in isometric testing due to decreased loading thus far and increased effort on obtaining active hyperextension. She will benefit increased emphasis on quad hypertrophy and strength to decrease deficit and improve functional independence for return to work and ADLs.    Goals:  Short Term Goals: 8-10 weeks MET  1. Pt will be compliant with HEP 50% of prescribed amount.  2. The pt to demo improvement in L knee ROM to equal R knee ROM pain free   3.  The pt to demo good quad set with proper hyperextension moment of the L knee   4. The pt to demo ambualting with elast restricitve AD witout major compensatory pattern L knee for at least 20 feet      Long Term Goals: 6 months  (Progressing, not met)   Pt will be compliant with % of prescribed amount.   Patient will improve their FOTO limitation score to at least 17% as evidence of clinically significant improvements in their function.  The pt to demo pain free and uncompensated walking mechanics x10 min on an indoor treadmill  The pt to demo tolerance to a squat at or bellow parallel with uncompensated mechanics x10   The pt to demo strength of L LE within 10% of R LE as demo'd on the biodex machine   The pt will report full participation in ADLs and IADLs without restrictions related to L knee.  Reasons for Recertification of Therapy:   updated plan of care    Plan     Updated Certification Period: 8/24/2023 to 12/31/2023  Recommended Treatment Plan: 2 times per 8 for 18 weeks: Electrical Stimulation NMES, Gait Training, Manual Therapy, Moist Heat/ Ice, Neuromuscular Re-ed, Patient Education, Therapeutic Activities, and Therapeutic Exercise  Other Recommendations: none    Jessica Kevin, PT, DPT  8/24/2023      I CERTIFY THE NEED FOR THESE SERVICES FURNISHED UNDER THIS PLAN OF TREATMENT AND WHILE UNDER MY CARE    Physician's comments:        Physician's Signature: ___________________________________________________

## 2023-08-31 ENCOUNTER — CLINICAL SUPPORT (OUTPATIENT)
Dept: REHABILITATION | Facility: HOSPITAL | Age: 28
End: 2023-08-31
Payer: MEDICAID

## 2023-08-31 DIAGNOSIS — M25.662 DECREASED RANGE OF MOTION OF LEFT KNEE: ICD-10-CM

## 2023-08-31 DIAGNOSIS — R26.9 GAIT ABNORMALITY: Primary | ICD-10-CM

## 2023-08-31 DIAGNOSIS — R29.898 DECREASED STRENGTH OF LOWER EXTREMITY: ICD-10-CM

## 2023-08-31 PROCEDURE — 97110 THERAPEUTIC EXERCISES: CPT

## 2023-08-31 NOTE — PROGRESS NOTES
[facility]   Physical Therapy Treatment Note     Name: Trinity Peres  [facility] Number: [id number]    Therapy Diagnosis:   Encounter Diagnoses   Name Primary?    Gait abnormality Yes    Decreased range of motion of left knee     Decreased strength of lower extremity        Physician: Fabiola Pool, *    Visit Date: 8/31/2023    Physician: Fabiola Pool, *     Physician Orders: PT Eval and Treat  Medical Diagnosis from Referral:   Diagnosis   S83.512D (ICD-10-CM) - Rupture of anterior cruciate ligament of left knee, subsequent encounter   S83.242D (ICD-10-CM) - Acute medial meniscus tear, left, subsequent encounter      Evaluation Date: 6/13/2023  Authorization Period Expiration: 09/12/2023  Plan of Care Expiration: 12/31/2023  Progress Note Due: 7/11/2023   Visit # / Visits authorized: 21/26 (+ eval)  FOTO #1 given at eval     Time In: 0125 pm (pt late)  Time Out: 0220 pm  Total Billable Time: 55 minutes - medicaid     DOS: 6/12/23  S/p: L ACLr  PROCEDURE:    Arthroscopic assisted ACL reconstruction using bone patellar tendon bone autograft  Bone grafting left tibia and patella  Post-Op Precautions: TTWB for another week, then may increase to 50/50 at week 4, then 100 from week 5-6), ROM 0-90 until 4 weeks post op, then advance to 120     Precautions: Standard, Weightbearing, and post op ACLr      SUBJECTIVE     Pt reports: she overslept and was late for her appointment today but has been working hard with her NMES unit to improve her quad function  Response to previous treatment: improved quad activation  Functional change: no functional change    Pain: 3/10  Location: left knee      OBJECTIVE   11 weeks 3 days s/p     Objective Measures updated at progress report unless specified.     Gait: ambulates with brace unlocked and no crutches, good     Knee Passive Range of Motion:    Right  Left    Flexion 141 140   Extension Hyper 8 Hyper 8        Quad Set: good control of  "hyperextension         Joint Mobility: hypomobile patella in all directions and hypomobile infrapatellar fat pad       Knee Extension Iso Right Left Affected Limb % symmetry % deficit   Crane scale 60 deg flex  53.2 kg  46.7 kg  48.9 kg 14.9 kg  15.3 kg  17.1 kg*  R 32% 68%         Treatment       Trinity received the treatments listed below:      THERAPEUTIC EXERCISES to develop strength, endurance, ROM, flexibility, posture, and core stabilization for 10 minutes including:   Hinge stretch; 10x 10"   Prone quad stretch; 3 x 20"     NP Today:   Upright bike; 8 min Lv 4.0 for aerobic activity  Heel prop; 5# above and below; 6 min     MANUAL THERAPY TECHNIQUES including Joint mobilizations and Soft tissue Mobilization were applied to left knee for 15 minutes.  Fat pad mob at 0 and 2o deg   Patellar mob Gr 3-4 in all directions  Seat EOT passive flexion with inf patellar glide 2 min    NEUROMUSCULAR RE-EDUCATION ACTIVITIES to improve Balance, Coordination, Kinesthetic, Sense, Proprioception, and Posture for  15 minutes.  The following were included:   Quad set w/ AAROM strap 10x 5" - NMES  SLR; 3 x 10- NMES  TKE GTB; 5 min -heavy cues for quad control    NP Today:  Partial Squat iso hold; 3 x 5 w/ 5" hold  DL bridge; BTB; 3 x 10 w/ 5" hol   DL HS curl 25#; 3 x 15  SL Leg press; 40#; 3 x 12  Step up 4"; 3 x 12 w/ mTrigger biofeedback- NP  BFR 80% occulusion OCK, 70% occulusion CKC 30/15/15/15   - SLR   - Prone HS curl YTB   - LAQ BTB    THERAPEUTIC ACTIVITIES to improve dynamic and functional performance for 20 minutes including.  Hammer strength knee ext; 40-90; 5#; 4 x 10 w/ 5" hold  SL Leg Press; 20#; 3 x 15   Lateral walks; GTB; 15 ft x 4 laos      GAIT TRAINING to improve functional mobility and safety for 00 minutes.       Patient Education and Home Exercises     Home Exercises Provided and Patient Education Provided     Education provided:   - prognosis  - diagnosis  - continued HEP  - s/p precautions: DVT, " infection, bandaging    Written Home Exercises Provided: yes. Exercises were reviewed and Trinity was able to demonstrate them prior to the end of the session.  Trinity demonstrated good  understanding of the education provided. See EMR under Patient Instructions for exercises provided during therapy sessions    ASSESSMENT   Trinity demonstrated improved quad control upon arrival to clinic but required assistance from NMES for full hyperextension moment. She tolerated OKC knee extension strengthening well with emphasis on motor control and patellar tracking. She will benefit continued progressive quad strengthening and well as emphasis on active hyperextension as she is behind expected progression at this stage.     Skilled rehab technician utilized during treatment    Trinity Is progressing well towards her goals.   Pt prognosis is Good.     Pt will continue to benefit from skilled outpatient physical therapy to address the deficits listed in the problem list box on initial evaluation, provide pt/family education and to maximize pt's level of independence in the home and community environment.     Pt's spiritual, cultural and educational needs considered and pt agreeable to plan of care and goals.     Anticipated barriers to physical therapy: none    Goals:  Short Term Goals: 8-10 weeks MET  1. Pt will be compliant with HEP 50% of prescribed amount.  2. The pt to demo improvement in L knee ROM to equal R knee ROM pain free   3.  The pt to demo good quad set with proper hyperextension moment of the L knee   4. The pt to demo ambualting with elast restricitve AD witout major compensatory pattern L knee for at least 20 feet      Long Term Goals: 6 months  (Progressing, not met)   Pt will be compliant with % of prescribed amount.   Patient will improve their FOTO limitation score to at least 17% as evidence of clinically significant improvements in their function.  The pt to demo pain free and uncompensated  walking mechanics x10 min on an indoor treadmill  The pt to demo tolerance to a squat at or bellow parallel with uncompensated mechanics x10   The pt to demo strength of L LE within 10% of R LE as demo'd on the biodex machine   The pt will report full participation in ADLs and IADLs without restrictions related to L knee.    PLAN     Progress per POC    Jessica Kevin, PT, DPT

## 2023-09-05 ENCOUNTER — CLINICAL SUPPORT (OUTPATIENT)
Dept: REHABILITATION | Facility: HOSPITAL | Age: 28
End: 2023-09-05
Payer: MEDICAID

## 2023-09-05 DIAGNOSIS — R26.9 GAIT ABNORMALITY: Primary | ICD-10-CM

## 2023-09-05 DIAGNOSIS — M25.662 DECREASED RANGE OF MOTION OF LEFT KNEE: ICD-10-CM

## 2023-09-05 DIAGNOSIS — R29.898 DECREASED STRENGTH OF LOWER EXTREMITY: ICD-10-CM

## 2023-09-05 PROCEDURE — 97110 THERAPEUTIC EXERCISES: CPT

## 2023-09-05 NOTE — PROGRESS NOTES
[facility]   Physical Therapy Treatment Note     Name: Trinity Peres  [facility] Number: [id number]    Therapy Diagnosis:   Encounter Diagnoses   Name Primary?    Gait abnormality Yes    Decreased range of motion of left knee     Decreased strength of lower extremity        Physician: Fabiola Pool, *    Visit Date: 9/5/2023    Physician: Fabiola Pool, *     Physician Orders: PT Eval and Treat  Medical Diagnosis from Referral:   Diagnosis   S83.512D (ICD-10-CM) - Rupture of anterior cruciate ligament of left knee, subsequent encounter   S83.242D (ICD-10-CM) - Acute medial meniscus tear, left, subsequent encounter      Evaluation Date: 6/13/2023  Authorization Period Expiration: 09/12/2023  Plan of Care Expiration: 12/31/2023  Progress Note Due: 7/11/2023   Visit # / Visits authorized: 22/26 (+ eval)  FOTO #1 given at eval     Time In: 0900 am  Time Out: 1000 am  Total Billable Time: 60 minutes - medicaid     DOS: 6/12/23  S/p: L ACLr  PROCEDURE:    Arthroscopic assisted ACL reconstruction using bone patellar tendon bone autograft  Bone grafting left tibia and patella  Post-Op Precautions: TTWB for another week, then may increase to 50/50 at week 4, then 100 from week 5-6), ROM 0-90 until 4 weeks post op, then advance to 120     Precautions: Standard, Weightbearing, and post op ACLr      SUBJECTIVE     Pt reports: she has been using her NMES unit all day on continuous   Response to previous treatment: improved quad activation  Functional change: no functional change    Pain: 3/10  Location: left knee      OBJECTIVE   12 weeks 1 days s/p as of 9/5    Objective Measures updated at progress report unless specified.     Gait: ambulates with brace unlocked and no crutches, good     Knee Passive Range of Motion:    Right  Left    Flexion 141 140   Extension Hyper 8 Hyper 8        Quad Set: good control of hyperextension         Joint Mobility: hypomobile patella in all directions  "and hypomobile infrapatellar fat pad       Knee Extension Iso Right Left Affected Limb % symmetry % deficit   Crane scale 60 deg flex  53.2 kg  46.7 kg  48.9 kg 14.9 kg  15.3 kg  17.1 kg*  R 32% 68%         Treatment       Trniity received the treatments listed below:      THERAPEUTIC EXERCISES to develop strength, endurance, ROM, flexibility, posture, and core stabilization for 15 minutes including:   Upright bike; 8 min Lv 4.0 for aerobic activity  Hinge stretch; 10x 10"   Prone quad stretch; 3 x 20"     NP Today:   Heel prop; 5# above and below; 6 min     MANUAL THERAPY TECHNIQUES including Joint mobilizations and Soft tissue Mobilization were applied to left knee for 15 minutes.  Fat pad mob at 0 and 2o deg   Patellar mob Gr 3-4 in all directions  Seat EOT passive flexion with inf patellar glide 2 min    NEUROMUSCULAR RE-EDUCATION ACTIVITIES to improve Balance, Coordination, Kinesthetic, Sense, Proprioception, and Posture for  30 minutes.  The following were included:   mTrigger biofeedback  Quad set w/ 1/2 foam roll under knee; 20x w/ 5" hold  Quad set w/ heel prop; 20 x w/ 5" hold  Quad set into hyper; 20x w/ 5" hold  SLR; 2 x 10  TKE GTB; 4 min    NP Today:  Partial Squat iso hold; 3 x 5 w/ 5" hold  DL bridge; BTB; 3 x 10 w/ 5" hol   DL HS curl 25#; 3 x 15  SL Leg press; 40#; 3 x 12  Step up 4"; 3 x 12 w/ mTrigger biofeedback- NP  BFR 80% occulusion OCK, 70% occulusion CKC 30/15/15/15   - SLR   - Prone HS curl YTB   - LAQ BTB    THERAPEUTIC ACTIVITIES to improve dynamic and functional performance for 00 minutes including.  Hammer strength knee ext; 40-90; 5#; 4 x 10 w/ 5" hold  SL Leg Press; 20#; 3 x 15   Lateral walks; GTB; 15 ft x 4 laos      GAIT TRAINING to improve functional mobility and safety for 00 minutes.       Patient Education and Home Exercises     Home Exercises Provided and Patient Education Provided     Education provided:   - prognosis  - diagnosis  - continued HEP  - s/p precautions: DVT, " infection, bandaging    Written Home Exercises Provided: yes. Exercises were reviewed and Trinity was able to demonstrate them prior to the end of the session.  Trinity demonstrated good  understanding of the education provided. See EMR under Patient Instructions for exercises provided during therapy sessions    ASSESSMENT   Trinity presented lacking appropriate quad control demonstrating poor carry over from last session. However, with addition of biofeedback she was able to achieve near full hyperextension and carried this over independently at end of session. She was educated on the importance of quad control in gait and functional movement as well as appropriate use of NMES unit. Will reassess for carry over next session to progress.     Skilled rehab technician utilized during treatment    Trinity Is progressing well towards her goals.   Pt prognosis is Good.     Pt will continue to benefit from skilled outpatient physical therapy to address the deficits listed in the problem list box on initial evaluation, provide pt/family education and to maximize pt's level of independence in the home and community environment.     Pt's spiritual, cultural and educational needs considered and pt agreeable to plan of care and goals.     Anticipated barriers to physical therapy: none    Goals:  Short Term Goals: 8-10 weeks MET  1. Pt will be compliant with HEP 50% of prescribed amount.  2. The pt to demo improvement in L knee ROM to equal R knee ROM pain free   3.  The pt to demo good quad set with proper hyperextension moment of the L knee   4. The pt to demo ambualting with elast restricitve AD witout major compensatory pattern L knee for at least 20 feet      Long Term Goals: 6 months  (Progressing, not met)   Pt will be compliant with % of prescribed amount.   Patient will improve their FOTO limitation score to at least 17% as evidence of clinically significant improvements in their function.  The pt to demo pain  free and uncompensated walking mechanics x10 min on an indoor treadmill  The pt to demo tolerance to a squat at or bellow parallel with uncompensated mechanics x10   The pt to demo strength of L LE within 10% of R LE as demo'd on the biodex machine   The pt will report full participation in ADLs and IADLs without restrictions related to L knee.    PLAN     Progress per POC    Jessica Kevin, PT, DPT

## 2023-09-07 ENCOUNTER — CLINICAL SUPPORT (OUTPATIENT)
Dept: REHABILITATION | Facility: HOSPITAL | Age: 28
End: 2023-09-07
Payer: MEDICAID

## 2023-09-07 DIAGNOSIS — R29.898 DECREASED STRENGTH OF LOWER EXTREMITY: ICD-10-CM

## 2023-09-07 DIAGNOSIS — R26.9 GAIT ABNORMALITY: Primary | ICD-10-CM

## 2023-09-07 DIAGNOSIS — M25.662 DECREASED RANGE OF MOTION OF LEFT KNEE: ICD-10-CM

## 2023-09-07 PROCEDURE — 97110 THERAPEUTIC EXERCISES: CPT

## 2023-09-07 NOTE — PROGRESS NOTES
[facility]   Physical Therapy Treatment Note     Name: Trinity Peres  [facility] Number: [id number]    Therapy Diagnosis:   Encounter Diagnoses   Name Primary?    Gait abnormality Yes    Decreased range of motion of left knee     Decreased strength of lower extremity        Physician: Fabiola Pool, *    Visit Date: 9/7/2023    Physician: Fabiola Pool, *     Physician Orders: PT Eval and Treat  Medical Diagnosis from Referral:   Diagnosis   S83.512D (ICD-10-CM) - Rupture of anterior cruciate ligament of left knee, subsequent encounter   S83.242D (ICD-10-CM) - Acute medial meniscus tear, left, subsequent encounter      Evaluation Date: 6/13/2023  Authorization Period Expiration: 09/12/2023  Plan of Care Expiration: 12/31/2023  Progress Note Due: 7/11/2023   Visit # / Visits authorized: 22/26 (+ eval)  FOTO #1 given at eval     Time In: 0900 am  Time Out: 1000 am  Total Billable Time: 60 minutes - medicaid     DOS: 6/12/23  S/p: L ACLr  PROCEDURE:    Arthroscopic assisted ACL reconstruction using bone patellar tendon bone autograft  Bone grafting left tibia and patella  Post-Op Precautions: TTWB for another week, then may increase to 50/50 at week 4, then 100 from week 5-6), ROM 0-90 until 4 weeks post op, then advance to 120     Precautions: Standard, Weightbearing, and post op ACLr      SUBJECTIVE     Pt reports: she has been using her NMES unit all day on continuous   Response to previous treatment: improved quad activation  Functional change: no functional change    Pain: 3/10  Location: left knee      OBJECTIVE   12 weeks 1 days s/p as of 9/5    Objective Measures updated at progress report unless specified.     Gait: ambulates with brace unlocked and no crutches, good     Knee Passive Range of Motion:    Right  Left    Flexion 141 140   Extension Hyper 8 Hyper 8        Quad Set: good control of hyperextension         Joint Mobility: hypomobile patella in all directions  "and hypomobile infrapatellar fat pad       Knee Extension Iso Right Left Affected Limb % symmetry % deficit   Crane scale 60 deg flex  53.2 kg  46.7 kg  48.9 kg 14.9 kg  15.3 kg  17.1 kg*  R 32% 68%         Treatment       Trinity received the treatments listed below:      THERAPEUTIC EXERCISES to develop strength, endurance, ROM, flexibility, posture, and core stabilization for 15 minutes including:   Upright bike; 8 min Lv 4.0 for aerobic activity  Hinge stretch; 10x 10"   Prone quad stretch; 3 x 20"     NP Today:   Heel prop; 5# above and below; 6 min     MANUAL THERAPY TECHNIQUES including Joint mobilizations and Soft tissue Mobilization were applied to left knee for 05 minutes.  Fat pad mob at 0 and 2o deg   Patellar mob Gr 3-4 in all directions  Seat EOT passive flexion with inf patellar glide 2 min    NEUROMUSCULAR RE-EDUCATION ACTIVITIES to improve Balance, Coordination, Kinesthetic, Sense, Proprioception, and Posture for  40 minutes.  The following were included:   mTrigger biofeedback  Quad set w/ 1/2 foam roll under knee; 20x w/ 5" hold  Quad set w/ heel prop; 20 x w/ 5" hold  Quad set into hyper; 20x w/ 5" hold  TKE GTB; 4 min  Step up 5"; 3 x 12  Hammer Strength Knee Extension; 5#; 3 x 15    NP Today:  Partial Squat iso hold; 3 x 5 w/ 5" hold  DL bridge; BTB; 3 x 10 w/ 5" hol   DL HS curl 25#; 3 x 15  SL Leg press; 40#; 3 x 12  BFR 80% occulusion OCK, 70% occulusion CKC 30/15/15/15   - SLR   - Prone HS curl YTB   - LAQ BTB    THERAPEUTIC ACTIVITIES to improve dynamic and functional performance for 00 minutes including.  Hammer strength knee ext; 40-90; 5#; 4 x 10 w/ 5" hold  SL Leg Press; 20#; 3 x 15   Lateral walks; GTB; 15 ft x 4 laos      GAIT TRAINING to improve functional mobility and safety for 00 minutes.       Patient Education and Home Exercises     Home Exercises Provided and Patient Education Provided     Education provided:   - prognosis  - diagnosis  - continued HEP  - s/p precautions: DVT, " infection, bandaging    Written Home Exercises Provided: yes. Exercises were reviewed and Trinity was able to demonstrate them prior to the end of the session.  Trinity demonstrated good  understanding of the education provided. See EMR under Patient Instructions for exercises provided during therapy sessions    ASSESSMENT   Trinity presented with mild carry over from last session but required significant time with biofeedback to regain quad control. She was able to tolerate knee extension and step up functional strengthening without issue. She is limited by end range quad control which is decreasing ability to progress in functional strength. She will benefit continued HEP emphasis on quad control. Will progress as able.     Skilled rehab technician utilized during treatment    Trinity Is progressing well towards her goals.   Pt prognosis is Good.     Pt will continue to benefit from skilled outpatient physical therapy to address the deficits listed in the problem list box on initial evaluation, provide pt/family education and to maximize pt's level of independence in the home and community environment.     Pt's spiritual, cultural and educational needs considered and pt agreeable to plan of care and goals.     Anticipated barriers to physical therapy: none    Goals:  Short Term Goals: 8-10 weeks MET  1. Pt will be compliant with HEP 50% of prescribed amount.  2. The pt to demo improvement in L knee ROM to equal R knee ROM pain free   3.  The pt to demo good quad set with proper hyperextension moment of the L knee   4. The pt to demo ambualting with elast restricitve AD witout major compensatory pattern L knee for at least 20 feet      Long Term Goals: 6 months  (Progressing, not met)   Pt will be compliant with % of prescribed amount.   Patient will improve their FOTO limitation score to at least 17% as evidence of clinically significant improvements in their function.  The pt to demo pain free and  uncompensated walking mechanics x10 min on an indoor treadmill  The pt to demo tolerance to a squat at or bellow parallel with uncompensated mechanics x10   The pt to demo strength of L LE within 10% of R LE as demo'd on the biodex machine   The pt will report full participation in ADLs and IADLs without restrictions related to L knee.    PLAN     Progress per POC    Jessica Kevin, PT, DPT

## 2023-09-08 DIAGNOSIS — F41.9 ANXIETY AND DEPRESSION: ICD-10-CM

## 2023-09-08 DIAGNOSIS — F32.A ANXIETY AND DEPRESSION: ICD-10-CM

## 2023-09-08 NOTE — TELEPHONE ENCOUNTER
No care due was identified.  [facility] Embedded Care Due Messages. Reference number: [id number].   9/08/2023 1:52:56 PM CDT

## 2023-09-11 ENCOUNTER — CLINICAL SUPPORT (OUTPATIENT)
Dept: REHABILITATION | Facility: HOSPITAL | Age: 28
End: 2023-09-11
Payer: MEDICAID

## 2023-09-11 DIAGNOSIS — R26.9 GAIT ABNORMALITY: Primary | ICD-10-CM

## 2023-09-11 DIAGNOSIS — R29.898 DECREASED STRENGTH OF LOWER EXTREMITY: ICD-10-CM

## 2023-09-11 DIAGNOSIS — M25.662 DECREASED RANGE OF MOTION OF LEFT KNEE: ICD-10-CM

## 2023-09-11 PROCEDURE — 97110 THERAPEUTIC EXERCISES: CPT

## 2023-09-11 RX ORDER — SERTRALINE HYDROCHLORIDE 50 MG/1
50 TABLET, FILM COATED ORAL DAILY
Qty: 90 TABLET | Refills: 3 | Status: SHIPPED | OUTPATIENT
Start: 2023-09-11 | End: 2024-09-10

## 2023-09-11 NOTE — PROGRESS NOTES
[facility]   Physical Therapy Treatment Note     Name: Trinity Peres  [facility] Number: [id number]    Therapy Diagnosis:   Encounter Diagnoses   Name Primary?    Gait abnormality Yes    Decreased range of motion of left knee     Decreased strength of lower extremity        Physician: Fabiola Pool, *    Visit Date: 9/11/2023    Physician: Fabiola Pool, *     Physician Orders: PT Eval and Treat  Medical Diagnosis from Referral:   Diagnosis   S83.512D (ICD-10-CM) - Rupture of anterior cruciate ligament of left knee, subsequent encounter   S83.242D (ICD-10-CM) - Acute medial meniscus tear, left, subsequent encounter      Evaluation Date: 6/13/2023  Authorization Period Expiration: 09/12/2023  Plan of Care Expiration: 12/31/2023  Progress Note Due: 7/11/2023   Visit # / Visits authorized: 24/26 (+ eval)  FOTO #1 given at eval     Time In: 0900 am  Time Out: 1000 am  Total Billable Time: 60 minutes - medicaid     DOS: 6/12/23  S/p: L ACLr  PROCEDURE:    Arthroscopic assisted ACL reconstruction using bone patellar tendon bone autograft  Bone grafting left tibia and patella  Post-Op Precautions: TTWB for another week, then may increase to 50/50 at week 4, then 100 from week 5-6), ROM 0-90 until 4 weeks post op, then advance to 120     Precautions: Standard, Weightbearing, and post op ACLr      SUBJECTIVE     Pt reports: she worked very hard on her quad this weekend  Response to previous treatment: improved quad activation  Functional change: no functional change    Pain: 3/10  Location: left knee      OBJECTIVE   13 weeks s/p as of 9/11    Objective Measures updated at progress report unless specified.     Gait: ambulates with brace unlocked and no crutches, good     Knee Passive Range of Motion:    Right  Left    Flexion 141 140   Extension Hyper 8 Hyper 8        Quad Set: good control of hyperextension         Joint Mobility: hypomobile patella in all directions and hypomobile  "infrapatellar fat pad       Knee Extension Iso Right Left Affected Limb % symmetry % deficit   Crane scale 60 deg flex  53.2 kg  46.7 kg  48.9 kg 14.9 kg  15.3 kg  17.1 kg*  R 32% 68%         Treatment       Trinity received the treatments listed below:      THERAPEUTIC EXERCISES to develop strength, endurance, ROM, flexibility, posture, and core stabilization for 15 minutes including:   Hinge stretch; 10x 10"   Prone quad stretch; 3 x 20"  SL Leg press; 40#; 3 x 12     NP Today:   Upright bike; 8 min Lv 4.0 for aerobic activity  Heel prop; 5# above and below; 6 min     MANUAL THERAPY TECHNIQUES including Joint mobilizations and Soft tissue Mobilization were applied to left knee for 05 minutes.  Fat pad mob at 0 and 2o deg   Patellar mob Gr 3-4 in all directions  Seat EOT passive flexion with inf patellar glide 2 min    NEUROMUSCULAR RE-EDUCATION ACTIVITIES to improve Balance, Coordination, Kinesthetic, Sense, Proprioception, and Posture for  40 minutes.  The following were included:   mTrigger biofeedback  Quad set into hyper; 20x w/ 5" hold  SLR 3 x 10  Step up 6"; 3 x 12  Hammer Strength Knee Extension; 5#; 3 x 15  Balance board DL squat; 3 min lat; 3 min fwd/bkwd    NP Today:  Partial Squat iso hold; 3 x 5 w/ 5" hold  DL bridge; BTB; 3 x 10 w/ 5" hol   DL HS curl 25#; 3 x 15  BFR 80% occulusion OCK, 70% occulusion CKC 30/15/15/15   - SLR   - Prone HS curl YTB   - LAQ BTB    THERAPEUTIC ACTIVITIES to improve dynamic and functional performance for 00 minutes including.  Hammer strength knee ext; 40-90; 5#; 4 x 10 w/ 5" hold  SL Leg Press; 20#; 3 x 15   Lateral walks; GTB; 15 ft x 4 laos      GAIT TRAINING to improve functional mobility and safety for 00 minutes.       Patient Education and Home Exercises     Home Exercises Provided and Patient Education Provided     Education provided:   - prognosis  - diagnosis  - continued HEP  - s/p precautions: DVT, infection, bandaging    Written Home Exercises Provided: yes. " Exercises were reviewed and Trinity was able to demonstrate them prior to the end of the session.  Trinity demonstrated good  understanding of the education provided. See EMR under Patient Instructions for exercises provided during therapy sessions    ASSESSMENT   Trinity presented with improved carry over from last session. Biofeedback was used to continue to inform quadriceps control. She struggled with even weight distribution in balance board squats but improved with increased repetition. She will benefit continued progression with emphasis on functional strengthening and end range quad control in weight bearing for decreased gait compensations.     Trinity Is progressing well towards her goals.   Pt prognosis is Good.     Pt will continue to benefit from skilled outpatient physical therapy to address the deficits listed in the problem list box on initial evaluation, provide pt/family education and to maximize pt's level of independence in the home and community environment.     Pt's spiritual, cultural and educational needs considered and pt agreeable to plan of care and goals.     Anticipated barriers to physical therapy: none    Goals:  Short Term Goals: 8-10 weeks MET  1. Pt will be compliant with HEP 50% of prescribed amount.  2. The pt to demo improvement in L knee ROM to equal R knee ROM pain free   3.  The pt to demo good quad set with proper hyperextension moment of the L knee   4. The pt to demo ambualting with elast restricitve AD witout major compensatory pattern L knee for at least 20 feet      Long Term Goals: 6 months  (Progressing, not met)   Pt will be compliant with % of prescribed amount.   Patient will improve their FOTO limitation score to at least 17% as evidence of clinically significant improvements in their function.  The pt to demo pain free and uncompensated walking mechanics x10 min on an indoor treadmill  The pt to demo tolerance to a squat at or bellow parallel with  uncompensated mechanics x10   The pt to demo strength of L LE within 10% of R LE as demo'd on the biodex machine   The pt will report full participation in ADLs and IADLs without restrictions related to L knee.    PLAN     Progress per POC    Jessica Kevin, PT, DPT

## 2023-09-14 ENCOUNTER — CLINICAL SUPPORT (OUTPATIENT)
Dept: REHABILITATION | Facility: HOSPITAL | Age: 28
End: 2023-09-14
Payer: MEDICAID

## 2023-09-14 DIAGNOSIS — R26.9 GAIT ABNORMALITY: Primary | ICD-10-CM

## 2023-09-14 DIAGNOSIS — M25.662 DECREASED RANGE OF MOTION OF LEFT KNEE: ICD-10-CM

## 2023-09-14 DIAGNOSIS — R29.898 DECREASED STRENGTH OF LOWER EXTREMITY: ICD-10-CM

## 2023-09-14 PROCEDURE — 97112 NEUROMUSCULAR REEDUCATION: CPT

## 2023-09-14 PROCEDURE — 97110 THERAPEUTIC EXERCISES: CPT

## 2023-09-14 PROCEDURE — 97530 THERAPEUTIC ACTIVITIES: CPT

## 2023-09-14 NOTE — PROGRESS NOTES
[facility]   Physical Therapy Treatment Note     Name: Trinity Peres  [facility] Number: [id number]    Therapy Diagnosis:   Encounter Diagnoses   Name Primary?    Gait abnormality Yes    Decreased range of motion of left knee     Decreased strength of lower extremity        Physician: Fabiola Pool, *    Visit Date: 9/14/2023    Physician: Fabiola Pool, *     Physician Orders: PT Eval and Treat  Medical Diagnosis from Referral:   Diagnosis   S83.512D (ICD-10-CM) - Rupture of anterior cruciate ligament of left knee, subsequent encounter   S83.242D (ICD-10-CM) - Acute medial meniscus tear, left, subsequent encounter      Evaluation Date: 6/13/2023  Authorization Period Expiration: 09/12/2023  Plan of Care Expiration: 12/31/2023  Progress Note Due: 7/11/2023   Visit # / Visits authorized: 25/26 (+ eval)  FOTO #1 given at eval     Time In: 1000 am  Time Out: 1100 am  Total Billable Time: 60 minutes - medicaid     DOS: 6/12/23  S/p: L ACLr  PROCEDURE:    Arthroscopic assisted ACL reconstruction using bone patellar tendon bone autograft  Bone grafting left tibia and patella  Post-Op Precautions: TTWB for another week, then may increase to 50/50 at week 4, then 100 from week 5-6), ROM 0-90 until 4 weeks post op, then advance to 120     Precautions: Standard, Weightbearing, and post op ACLr      SUBJECTIVE     Pt reports: she feels good today  Response to previous treatment: improved quad activation  Functional change: no functional change    Pain: 3/10  Location: left knee      OBJECTIVE   13 weeks and 3 days s/p as of 9/14    Objective Measures updated at progress report unless specified.     Gait: ambulates with brace unlocked and no crutches, good     Knee Passive Range of Motion:    Right  Left    Flexion 141 140   Extension Hyper 8 Hyper 8        Quad Set: good control of hyperextension         Joint Mobility: hypomobile patella in all directions and hypomobile infrapatellar  "fat pad       Knee Extension Iso Right Left Affected Limb % symmetry % deficit   Crane scale 60 deg flex  53.2 kg  46.7 kg  48.9 kg 14.9 kg  15.3 kg  17.1 kg*  R 32% 68%         Treatment       Trinity received the treatments listed below:      THERAPEUTIC EXERCISES to develop strength, endurance, ROM, flexibility, posture, and core stabilization for 10 minutes including:   Hinge stretch; 10x 10"   Prone quad stretch; 3 x 20"    NP Today:   Upright bike; 8 min Lv 4.0 for aerobic activity  Heel prop; 5# above and below; 6 min   SL Leg press; 40#; 3 x 12     MANUAL THERAPY TECHNIQUES including Joint mobilizations and Soft tissue Mobilization were applied to left knee for 05 minutes.  Fat pad mob at 0 and 2o deg   Patellar mob Gr 3-4 in all directions  Seat EOT passive flexion with inf patellar glide 2 min    NEUROMUSCULAR RE-EDUCATION ACTIVITIES to improve Balance, Coordination, Kinesthetic, Sense, Proprioception, and Posture for  20 minutes.  The following were included:   SAQ 10 x 10" x 2   Quad set into hyper; 20x w/ 5" hold x 2  SLR 3 x 10  SL hip abd; 2 x 15       NP Today:  Partial Squat iso hold; 3 x 5 w/ 5" hold  DL bridge; BTB; 3 x 10 w/ 5" hol d  Balance board DL squat; 3 min lat; 3 min fwd/bkwd  DL HS curl 25#; 3 x 15  Step up 6"; 3 x 12  BFR 80% occulusion OCK, 70% occulusion CKC 30/15/15/15   - SLR   - Prone HS curl YTB   - LAQ BTB    THERAPEUTIC ACTIVITIES to improve dynamic and functional performance for 30 minutes including.  Hammer strength knee ext; 40-90; 5#; 4 x 15 w/ 5" hold  HS curl; 15#; 3 x 15  SL Leg Press; 20#; 3 x 15 - NP  Monster walks; YTB; 15 ft x 2 laps      GAIT TRAINING to improve functional mobility and safety for 00 minutes.       Patient Education and Home Exercises     Home Exercises Provided and Patient Education Provided     Education provided:   - prognosis  - diagnosis  - continued HEP  - s/p precautions: DVT, infection, bandaging    Written Home Exercises Provided: yes. " Exercises were reviewed and Trinity was able to demonstrate them prior to the end of the session.  Trinity demonstrated good  understanding of the education provided. See EMR under Patient Instructions for exercises provided during therapy sessions    ASSESSMENT   Trinity was able to achieve active hyperextension of her knee following SAQs. She was incredibly fatigued following knee extension machine and was advised to lock brace to ambulate for stability in gait. She will benefit continued heavy quad emphasis to improve function.     Trinity Is progressing well towards her goals.   Pt prognosis is Good.     Pt will continue to benefit from skilled outpatient physical therapy to address the deficits listed in the problem list box on initial evaluation, provide pt/family education and to maximize pt's level of independence in the home and community environment.     Pt's spiritual, cultural and educational needs considered and pt agreeable to plan of care and goals.     Anticipated barriers to physical therapy: none    Goals:  Short Term Goals: 8-10 weeks MET  1. Pt will be compliant with HEP 50% of prescribed amount.  2. The pt to demo improvement in L knee ROM to equal R knee ROM pain free   3.  The pt to demo good quad set with proper hyperextension moment of the L knee   4. The pt to demo ambualting with elast restricitve AD witout major compensatory pattern L knee for at least 20 feet      Long Term Goals: 6 months  (Progressing, not met)   Pt will be compliant with % of prescribed amount.   Patient will improve their FOTO limitation score to at least 17% as evidence of clinically significant improvements in their function.  The pt to demo pain free and uncompensated walking mechanics x10 min on an indoor treadmill  The pt to demo tolerance to a squat at or bellow parallel with uncompensated mechanics x10   The pt to demo strength of L LE within 10% of R LE as demo'd on the biodex machine   The pt will  report full participation in ADLs and IADLs without restrictions related to L knee.    PLAN     Progress per POC    Jessica Kevin, PT, DPT

## 2023-09-18 ENCOUNTER — OFFICE VISIT (OUTPATIENT)
Dept: ORTHOPEDICS | Facility: CLINIC | Age: 28
End: 2023-09-18
Payer: MEDICAID

## 2023-09-18 ENCOUNTER — CLINICAL SUPPORT (OUTPATIENT)
Dept: REHABILITATION | Facility: HOSPITAL | Age: 28
End: 2023-09-18
Payer: MEDICAID

## 2023-09-18 VITALS
SYSTOLIC BLOOD PRESSURE: 122 MMHG | BODY MASS INDEX: 30.49 KG/M2 | HEIGHT: 65 IN | WEIGHT: 183 LBS | HEART RATE: 73 BPM | DIASTOLIC BLOOD PRESSURE: 85 MMHG

## 2023-09-18 DIAGNOSIS — Z98.890 S/P ACL RECONSTRUCTION: Primary | ICD-10-CM

## 2023-09-18 DIAGNOSIS — R29.898 DECREASED STRENGTH OF LOWER EXTREMITY: ICD-10-CM

## 2023-09-18 DIAGNOSIS — M25.662 DECREASED RANGE OF MOTION OF LEFT KNEE: ICD-10-CM

## 2023-09-18 DIAGNOSIS — R26.9 GAIT ABNORMALITY: Primary | ICD-10-CM

## 2023-09-18 PROCEDURE — 3079F DIAST BP 80-89 MM HG: CPT | Mod: CPTII,,, | Performed by: ORTHOPAEDIC SURGERY

## 2023-09-18 PROCEDURE — 3074F SYST BP LT 130 MM HG: CPT | Mod: CPTII,,, | Performed by: ORTHOPAEDIC SURGERY

## 2023-09-18 PROCEDURE — 1159F PR MEDICATION LIST DOCUMENTED IN MEDICAL RECORD: ICD-10-PCS | Mod: CPTII,,, | Performed by: ORTHOPAEDIC SURGERY

## 2023-09-18 PROCEDURE — 3074F PR MOST RECENT SYSTOLIC BLOOD PRESSURE < 130 MM HG: ICD-10-PCS | Mod: CPTII,,, | Performed by: ORTHOPAEDIC SURGERY

## 2023-09-18 PROCEDURE — 99213 OFFICE O/P EST LOW 20 MIN: CPT | Mod: PBBFAC,PN | Performed by: ORTHOPAEDIC SURGERY

## 2023-09-18 PROCEDURE — 97110 THERAPEUTIC EXERCISES: CPT

## 2023-09-18 PROCEDURE — 99999 PR PBB SHADOW E&M-EST. PATIENT-LVL III: ICD-10-PCS | Mod: PBBFAC,,, | Performed by: ORTHOPAEDIC SURGERY

## 2023-09-18 PROCEDURE — 99213 OFFICE O/P EST LOW 20 MIN: CPT | Mod: S$PBB,,, | Performed by: ORTHOPAEDIC SURGERY

## 2023-09-18 PROCEDURE — 3008F BODY MASS INDEX DOCD: CPT | Mod: CPTII,,, | Performed by: ORTHOPAEDIC SURGERY

## 2023-09-18 PROCEDURE — 99213 PR OFFICE/OUTPT VISIT, EST, LEVL III, 20-29 MIN: ICD-10-PCS | Mod: S$PBB,,, | Performed by: ORTHOPAEDIC SURGERY

## 2023-09-18 PROCEDURE — 99999 PR PBB SHADOW E&M-EST. PATIENT-LVL III: CPT | Mod: PBBFAC,,, | Performed by: ORTHOPAEDIC SURGERY

## 2023-09-18 PROCEDURE — 3079F PR MOST RECENT DIASTOLIC BLOOD PRESSURE 80-89 MM HG: ICD-10-PCS | Mod: CPTII,,, | Performed by: ORTHOPAEDIC SURGERY

## 2023-09-18 PROCEDURE — 3008F PR BODY MASS INDEX (BMI) DOCUMENTED: ICD-10-PCS | Mod: CPTII,,, | Performed by: ORTHOPAEDIC SURGERY

## 2023-09-18 PROCEDURE — 1159F MED LIST DOCD IN RCRD: CPT | Mod: CPTII,,, | Performed by: ORTHOPAEDIC SURGERY

## 2023-09-18 NOTE — PROGRESS NOTES
[facility]   Physical Therapy Treatment Note     Name: Triinty Peres  [facility] Number: [id number]    Therapy Diagnosis:   Encounter Diagnoses   Name Primary?    Gait abnormality Yes    Decreased range of motion of left knee     Decreased strength of lower extremity        Physician: Fabiola Pool, *    Visit Date: 9/18/2023    Physician: Fabiola Pool, *     Physician Orders: PT Eval and Treat  Medical Diagnosis from Referral:   Diagnosis   S83.512D (ICD-10-CM) - Rupture of anterior cruciate ligament of left knee, subsequent encounter   S83.242D (ICD-10-CM) - Acute medial meniscus tear, left, subsequent encounter      Evaluation Date: 6/13/2023  Authorization Period Expiration: 09/12/2023  Plan of Care Expiration: 12/31/2023  Progress Note Due: 7/11/2023   Visit # / Visits authorized: 25/26 (+ eval)  FOTO #1 given at eval     Time In: 1020 am (pt late)  Time Out: 1100 am  Total Billable Time: 40 minutes - medicaid     DOS: 6/12/23  S/p: L ACLr  PROCEDURE:    Arthroscopic assisted ACL reconstruction using bone patellar tendon bone autograft  Bone grafting left tibia and patella  Post-Op Precautions: TTWB for another week, then may increase to 50/50 at week 4, then 100 from week 5-6), ROM 0-90 until 4 weeks post op, then advance to 120     Precautions: Standard, Weightbearing, and post op ACLr      SUBJECTIVE     Pt reports: she got 1/2 way here and realized she didn't have tennis shoes and turned around making her late.   Response to previous treatment: improved quad activation  Functional change: no functional change    Pain: 3/10  Location: left knee      OBJECTIVE   14 weeks s/p as of 9/18    Objective Measures updated at progress report unless specified.     Gait: ambulates with brace unlocked and no crutches, good     Knee Passive Range of Motion:    Right  Left    Flexion 141 140   Extension Hyper 8 Hyper 8        Quad Set: good control of hyperextension         Joint  "Mobility: hypomobile patella in all directions and hypomobile infrapatellar fat pad       Knee Extension Iso Right Left Affected Limb % symmetry % deficit   Crane scale 60 deg flex  53.2 kg  46.7 kg  48.9 kg 14.9 kg  15.3 kg  17.1 kg*  R 32% 68%         Treatment       Trinity received the treatments listed below:      THERAPEUTIC EXERCISES to develop strength, endurance, ROM, flexibility, posture, and core stabilization for 00 minutes including:       NP Today:   Upright bike; 8 min Lv 4.0 for aerobic activity  Heel prop; 5# above and below; 6 min   SL Leg press; 40#; 3 x 12   Hinge stretch; 10x 10"   Prone quad stretch; 3 x 20"    MANUAL THERAPY TECHNIQUES including Joint mobilizations and Soft tissue Mobilization were applied to left knee for 05 minutes.  Fat pad mob at 0 and 2o deg   Patellar mob Gr 3-4 in all directions  Seat EOT passive flexion with inf patellar glide 2 min    NEUROMUSCULAR RE-EDUCATION ACTIVITIES to improve Balance, Coordination, Kinesthetic, Sense, Proprioception, and Posture for  10 minutes.  The following were included:   SAQ 10 x 10" x 2   Quad set into hyper; 20x w/ 5" hold x 2  SLR 3 x 10        NP Today:  Partial Squat iso hold; 3 x 5 w/ 5" hold  DL bridge; BTB; 3 x 10 w/ 5" hol d  Balance board DL squat; 3 min lat; 3 min fwd/bkwd  DL HS curl 25#; 3 x 15  Step up 6"; 3 x 12  BFR 80% occulusion OCK, 70% occulusion CKC 30/15/15/15   - SLR   - Prone HS curl YTB   - LAQ BTB  SL hip abd; 2 x 15     THERAPEUTIC ACTIVITIES to improve dynamic and functional performance for 30 minutes including.  Hammer strength knee ext; 40-90; 7.5# (closer pin); 3 x 10  HS curl; 15#; 3 x 15- NP  SL Leg Press; 60#; 3 x 15  Monster walks; YTB; 15 ft x 2 laps- NP      GAIT TRAINING to improve functional mobility and safety for 00 minutes.       Patient Education and Home Exercises     Home Exercises Provided and Patient Education Provided     Education provided:   - prognosis  - diagnosis  - continued HEP  - s/p " precautions: DVT, infection, bandaging    Written Home Exercises Provided: yes. Exercises were reviewed and Trinity was able to demonstrate them prior to the end of the session.  Trinity demonstrated good  understanding of the education provided. See EMR under Patient Instructions for exercises provided during therapy sessions    ASSESSMENT   Trinity presented with good passive hyperextension and regained active hyper following SAQ and quad sets. She tolerated functional strengthening well despite abbreviated session due to late arrival. She is behind expected progress due to poor carry over in TKE motor control, but is progressing each session in functional movement. Will continue to progress as able.    Trinity Is progressing well towards her goals.   Pt prognosis is Good.     Pt will continue to benefit from skilled outpatient physical therapy to address the deficits listed in the problem list box on initial evaluation, provide pt/family education and to maximize pt's level of independence in the home and community environment.     Pt's spiritual, cultural and educational needs considered and pt agreeable to plan of care and goals.     Anticipated barriers to physical therapy: none    Goals:  Short Term Goals: 8-10 weeks MET  1. Pt will be compliant with HEP 50% of prescribed amount.  2. The pt to demo improvement in L knee ROM to equal R knee ROM pain free   3.  The pt to demo good quad set with proper hyperextension moment of the L knee   4. The pt to demo ambualting with elast restricitve AD witout major compensatory pattern L knee for at least 20 feet      Long Term Goals: 6 months  (Progressing, not met)   Pt will be compliant with % of prescribed amount.   Patient will improve their FOTO limitation score to at least 17% as evidence of clinically significant improvements in their function.  The pt to demo pain free and uncompensated walking mechanics x10 min on an indoor treadmill  The pt to demo  tolerance to a squat at or bellow parallel with uncompensated mechanics x10   The pt to demo strength of L LE within 10% of R LE as demo'd on the biodex machine   The pt will report full participation in ADLs and IADLs without restrictions related to L knee.    PLAN     Progress per POC    Jessica Kevin, PT, DPT

## 2023-09-18 NOTE — PROGRESS NOTES
Post-op Note    HPI    Trinity Peres is here 12 weeks s/p the following procedure:     6/12/2023     Arthroscopic assisted ACL reconstruction using bone patellar tendon bone autograft  Bone grafting left tibia and patella    Overall doing well.  No pain today.  Still doing physical therapy.  Overall no complaints.      Physical Exam:     Patient is alert and oriented no acute distress.   Assistive Device:  None    Left knee: sutures removed Incision(s) are well healed.  Keloid.  There is no evidence of dehiscence.  There is no induration erythema or signs of infection.  No soft tissue swelling.  Compartments are soft and compressible.  Warm well-perfused extremity. ROM 0-120.  Stable Lachman's exam.  Stable to varus and valgus stress.    Imaging:  Left knee xrays reveal s/p ACL reconstruction with no evidence of hardware complication    Assessment    Trinity Peres is 12 weeks Post-op     Plan:    Overall doing as expected.  We discussed expectations of surgery and postoperative course.     Pain: Continued postoperative pain regimen -- ibuprofen   PT/OT: Continue/Initiate physical therapy: WBAT, cont PT and home exercise program.  I am okay with beginning light jogging.  No lateral pivoting movements yet.     Follow-up:  3 months

## 2023-09-21 ENCOUNTER — CLINICAL SUPPORT (OUTPATIENT)
Dept: REHABILITATION | Facility: HOSPITAL | Age: 28
End: 2023-09-21
Payer: MEDICAID

## 2023-09-21 DIAGNOSIS — R26.9 GAIT ABNORMALITY: Primary | ICD-10-CM

## 2023-09-21 DIAGNOSIS — R29.898 DECREASED STRENGTH OF LOWER EXTREMITY: ICD-10-CM

## 2023-09-21 DIAGNOSIS — M25.662 DECREASED RANGE OF MOTION OF LEFT KNEE: ICD-10-CM

## 2023-09-21 PROCEDURE — 97110 THERAPEUTIC EXERCISES: CPT

## 2023-09-21 NOTE — PROGRESS NOTES
----- Message from [employer] sent at 2021  2:33 PM EST -----  Subject: Appointment Request    Reason for Call: New Patient Request Appointment    QUESTIONS  Type of Appointment? New Patient/New to Provider  Reason for appointment request? Other - SOONER APPOINTMENTS  Additional Information for Provider? Massiel Coronel stated if there's any   available appointment before 2021, she would like one for son Friday's   works better due to job  ---------------------------------------------------------------------------  --------------  Omelia Counter INFO  What is the best way for the office to contact you? OK to leave message on   voicemail  Preferred Call Back Phone Number? [phone]  ---------------------------------------------------------------------------  --------------  SCRIPT ANSWERS  Relationship to Patient? Third Party  Representative Name? [address]  Specialty Confirmation? Primary Care  Is this the first appointment to establish care for a ? No  Have you been diagnosed with, awaiting test results for, or told that you   are suspected of having COVID-19 (Coronavirus)? (If patient has tested   negative or was tested as a requirement for work, school, or travel and   not based on symptoms, answer no)? No  Within the past two weeks have you developed any of the following symptoms   (answer no if symptoms have been present longer than 2 weeks or began   more than 2 weeks ago)? Fever or Chills, Cough, Shortness of breath or   difficulty breathing, Loss of taste or smell, Sore throat, Nasal   congestion, Sneezing or runny nose, Fatigue or generalized body aches   (answer no if pain is specific to a body part e.g. back pain), Diarrhea,   Headache? No  Have you had close contact with someone with COVID-19 in the last 14 days? No  (Service Expert  click yes below to proceed with [employer]   Scheduling)?  Yes [facility]   Physical Therapy Treatment Note     Name: Trinity Peres  [facility] Number: [id number]    Therapy Diagnosis:   Encounter Diagnoses   Name Primary?    Gait abnormality Yes    Decreased range of motion of left knee     Decreased strength of lower extremity        Physician: Fabiola Pool, *    Visit Date: 9/21/2023    Physician: Fabiola Pool, *     Physician Orders: PT Eval and Treat  Medical Diagnosis from Referral:   Diagnosis   S83.512D (ICD-10-CM) - Rupture of anterior cruciate ligament of left knee, subsequent encounter   S83.242D (ICD-10-CM) - Acute medial meniscus tear, left, subsequent encounter      Evaluation Date: 6/13/2023  Authorization Period Expiration: 09/12/2023  Plan of Care Expiration: 12/31/2023  Progress Note Due: 7/11/2023   Visit # / Visits authorized: 27/30 (+ eval)  FOTO #1 given at eval     Time In: 1000 am  Time Out: 1100 am  Total Billable Time: 60 minutes - medicaid     DOS: 6/12/23  S/p: L ACLr  PROCEDURE:    Arthroscopic assisted ACL reconstruction using bone patellar tendon bone autograft  Bone grafting left tibia and patella  Post-Op Precautions: TTWB for another week, then may increase to 50/50 at week 4, then 100 from week 5-6), ROM 0-90 until 4 weeks post op, then advance to 120     Precautions: Standard, Weightbearing, and post op ACLr      SUBJECTIVE     Pt reports: she is working hard on her gait and wears her brace when she can feel her knee flexing as her quad fatigued  Response to previous treatment: improved quad activation  Functional change: no functional change    Pain: 3/10  Location: left knee      OBJECTIVE   14 weeks and 4 days s/p as of 9/21    Objective Measures updated at progress report unless specified.     Gait: ambulates without brace, good quad control for 10 minutes of walking prior to return of flexed knee gait compensations    Knee Passive Range of Motion:    Right  Left    Flexion 141 140   Extension  "Hyper 8 Hyper 8        Quad Set: good control of hyperextension         Joint Mobility: hypomobile patella in all directions and hypomobile infrapatellar fat pad       Knee Extension Iso Right Left Affected Limb % symmetry % deficit   Crane scale 60 deg flex  53.2 kg  46.7 kg  48.9 kg 14.9 kg  15.3 kg  17.1 kg*  R 32% 68%         Treatment       Trinity received the treatments listed below:      THERAPEUTIC EXERCISES to develop strength, endurance, ROM, flexibility, posture, and core stabilization for 10 minutes including:   Upright bike; 8 min Lv 4.0 for aerobic activity  Hinge stretch; 10x 10"     NP Today:   Heel prop; 5# above and below; 6 min   Prone quad stretch; 3 x 20"    MANUAL THERAPY TECHNIQUES including Joint mobilizations and Soft tissue Mobilization were applied to left knee for 05 minutes.  Fat pad mob at 0 and 2o deg   Patellar mob Gr 3-4 in all directions  Seat EOT passive flexion with inf patellar glide 2 min    NEUROMUSCULAR RE-EDUCATION ACTIVITIES to improve Balance, Coordination, Kinesthetic, Sense, Proprioception, and Posture for  15 minutes.  The following were included:   SAQ 10 x 10" x 2   Quad set into hyper; 10x w/ 10" hold x 2  SLR 3 x 10  Balance board DL squat; 3 min lat; 3 min fwd/bkwd      NP Today:  DL HS curl 25#; 3 x 15  Step up 6"; 3 x 12  BFR 80% occulusion OCK, 70% occulusion CKC 30/15/15/15   - SLR   - Prone HS curl YTB   - LAQ BTB  SL hip abd; 2 x 15     THERAPEUTIC ACTIVITIES to improve dynamic and functional performance for 25 minutes including.  Hammer strength knee ext; 40-90; 7.5# (closer pin); 3 x 10  HS curl; 15#; 3 x 15  SL Leg Press; 60#; 3 x 15  Monster walks; YTB; 15 ft x 2 laps- NP      GAIT TRAINING to improve functional mobility and safety for 00 minutes.       Patient Education and Home Exercises     Home Exercises Provided and Patient Education Provided     Education provided:   - prognosis  - diagnosis  - continued HEP  - s/p precautions: DVT, infection, " bandaging    Written Home Exercises Provided: yes. Exercises were reviewed and Trinity was able to demonstrate them prior to the end of the session.  Trinity demonstrated good  understanding of the education provided. See EMR under Patient Instructions for exercises provided during therapy sessions    ASSESSMENT   Trinity continues to present lacking passive or active TKE but regains this quickly at start of session. She is improving in motor control and strength but will benefit increased endurance in end range quad control to avoid continual loss of TKE. Will continue to progress as able.    Skilled rehab technician utilized during treatment    Trinity Is progressing well towards her goals.   Pt prognosis is Good.     Pt will continue to benefit from skilled outpatient physical therapy to address the deficits listed in the problem list box on initial evaluation, provide pt/family education and to maximize pt's level of independence in the home and community environment.     Pt's spiritual, cultural and educational needs considered and pt agreeable to plan of care and goals.     Anticipated barriers to physical therapy: none    Goals:  Short Term Goals: 8-10 weeks MET  1. Pt will be compliant with HEP 50% of prescribed amount.  2. The pt to demo improvement in L knee ROM to equal R knee ROM pain free   3.  The pt to demo good quad set with proper hyperextension moment of the L knee   4. The pt to demo ambualting with elast restricitve AD witout major compensatory pattern L knee for at least 20 feet      Long Term Goals: 6 months  (Progressing, not met)   Pt will be compliant with % of prescribed amount.   Patient will improve their FOTO limitation score to at least 17% as evidence of clinically significant improvements in their function.  The pt to demo pain free and uncompensated walking mechanics x10 min on an indoor treadmill  The pt to demo tolerance to a squat at or bellow parallel with uncompensated  mechanics x10   The pt to demo strength of L LE within 10% of R LE as demo'd on the biodex machine   The pt will report full participation in ADLs and IADLs without restrictions related to L knee.    PLAN     Progress per POC    Jessica Kevin, PT, DPT

## 2023-09-28 ENCOUNTER — CLINICAL SUPPORT (OUTPATIENT)
Dept: REHABILITATION | Facility: HOSPITAL | Age: 28
End: 2023-09-28
Payer: MEDICAID

## 2023-09-28 DIAGNOSIS — M25.662 DECREASED RANGE OF MOTION OF LEFT KNEE: ICD-10-CM

## 2023-09-28 DIAGNOSIS — R29.898 DECREASED STRENGTH OF LOWER EXTREMITY: ICD-10-CM

## 2023-09-28 DIAGNOSIS — R26.9 GAIT ABNORMALITY: Primary | ICD-10-CM

## 2023-09-28 PROCEDURE — 97110 THERAPEUTIC EXERCISES: CPT

## 2023-09-28 NOTE — PROGRESS NOTES
[facility]   Physical Therapy Treatment Note     Name: Trinity Peres  [facility] Number: [id number]    Therapy Diagnosis:   Encounter Diagnoses   Name Primary?    Gait abnormality Yes    Decreased range of motion of left knee     Decreased strength of lower extremity        Physician: Fabiola Pool, *    Visit Date: 2023    Physician: Fabiola Pool, *     Physician Orders: PT Eval and Treat  Medical Diagnosis from Referral:   Diagnosis   S83.512D (ICD-10-CM) - Rupture of anterior cruciate ligament of left knee, subsequent encounter   S83.242D (ICD-10-CM) - Acute medial meniscus tear, left, subsequent encounter      Evaluation Date: 2023  Authorization Period Expiration: 2023  Plan of Care Expiration: 2023  Progress Note Due: 2023   Visit # / Visits authorized:  (+ 28)  FOTO #1 given at eval     Time In: 1000 am  Time Out: 1100 am  Total Billable Time: 60 minutes - medicaid     DOS: 23  S/p: L ACLr  PROCEDURE:    Arthroscopic assisted ACL reconstruction using bone patellar tendon bone autograft  Bone grafting left tibia and patella  Post-Op Precautions: TTWB for another week, then may increase to 50/50 at week 4, then 100 from week 5-6), ROM 0-90 until 4 weeks post op, then advance to 120     Precautions: Standard, Weightbearing, and post op ACLr      SUBJECTIVE     Pt reports: she traveled to [place] for a  and feels her knee has gotten stiff. She got back around 2:00 this AM  Response to previous treatment: improved quad activation  Functional change: no functional change    Pain: 3/10  Location: left knee      OBJECTIVE   15 weeks and 3 days s/p as of     Objective Measures updated at progress report unless specified.     Gait: ambulates without brace, good quad control for 10 minutes of walking prior to return of flexed knee gait compensations    Knee Passive Range of Motion:    Right  Left    Flexion 141 140   Extension Hyper  "8 Hyper 8        Quad Set: good control of hyperextension         Joint Mobility: hypomobile patella in all directions and hypomobile infrapatellar fat pad       Knee Extension Iso Right Left Affected Limb % symmetry % deficit   Crane scale 60 deg flex  53.2 kg  46.7 kg  48.9 kg 14.9 kg  15.3 kg  17.1 kg*  R 32% 68%         Treatment       Trinity received the treatments listed below:      THERAPEUTIC EXERCISES to develop strength, endurance, ROM, flexibility, posture, and core stabilization for 15 minutes including:   Upright bike; 8 min Lv 4.0 for aerobic activity  Hinge stretch; 10x 10"   Heel prop; 5# above and below; 6 min     NP Today:   Prone quad stretch; 3 x 20"    MANUAL THERAPY TECHNIQUES including Joint mobilizations and Soft tissue Mobilization were applied to left knee for 05 minutes.  Fat pad mob at 0 and 2o deg   Patellar mob Gr 3-4 in all directions  Seat EOT passive flexion with inf patellar glide 2 min    NEUROMUSCULAR RE-EDUCATION ACTIVITIES to improve Balance, Coordination, Kinesthetic, Sense, Proprioception, and Posture for  15 minutes.  The following were included:   SAQ 10 x 10" x 2   Quad set into hyper; 10x w/ 10" hold x 2  SLR 3 x 10  Soleus piliates chair; 2 springs; 20x     NP Today:  DL HS curl 25#; 3 x 15  Step up 6"; 3 x 12  BFR 80% occulusion OCK, 70% occulusion CKC 30/15/15/15   - SLR   - Prone HS curl YTB   - LAQ BTB  SL hip abd; 2 x 15   Balance board DL squat; 3 min lat; 3 min fwd/bkwd    THERAPEUTIC ACTIVITIES to improve dynamic and functional performance for 25 minutes including.  Spilt squat; 3 x 10 B  Box squat; 24"; 2 x 10 w/ 3" hold      NP Today:   HS curl; 15#; 3 x 15  SL Leg Press; 60#; 3 x 15  Monster walks; YTB; 15 ft x 2 laps- NP  Hammer strength knee ext; 40-90; 7.5# (closer pin); 3 x 10      GAIT TRAINING to improve functional mobility and safety for 00 minutes.       Patient Education and Home Exercises     Home Exercises Provided and Patient Education Provided "     Education provided:   - prognosis  - diagnosis  - continued HEP  - s/p precautions: DVT, infection, bandaging    Written Home Exercises Provided: yes. Exercises were reviewed and Trinity was able to demonstrate them prior to the end of the session.  Trinity demonstrated good  understanding of the education provided. See EMR under Patient Instructions for exercises provided during therapy sessions    ASSESSMENT   Trinity is continuing to tolerate CKC loading well with good quad fatigue at end of session. She struggled to avoid unloading surgical leg during DL squat but improved with verbal cues and pauses. She was instructed to work on gym based strengthening program and to return next session with any difficulties or questions found.     Trinity Is progressing well towards her goals.   Pt prognosis is Good.     Pt will continue to benefit from skilled outpatient physical therapy to address the deficits listed in the problem list box on initial evaluation, provide pt/family education and to maximize pt's level of independence in the home and community environment.     Pt's spiritual, cultural and educational needs considered and pt agreeable to plan of care and goals.     Anticipated barriers to physical therapy: none    Goals:  Short Term Goals: 8-10 weeks MET  1. Pt will be compliant with HEP 50% of prescribed amount.  2. The pt to demo improvement in L knee ROM to equal R knee ROM pain free   3.  The pt to demo good quad set with proper hyperextension moment of the L knee   4. The pt to demo ambualting with elast restricitve AD witout major compensatory pattern L knee for at least 20 feet      Long Term Goals: 6 months  (Progressing, not met)   Pt will be compliant with % of prescribed amount.   Patient will improve their FOTO limitation score to at least 17% as evidence of clinically significant improvements in their function.  The pt to demo pain free and uncompensated walking mechanics x10 min on an  indoor treadmill  The pt to demo tolerance to a squat at or bellow parallel with uncompensated mechanics x10   The pt to demo strength of L LE within 10% of R LE as demo'd on the biodex machine   The pt will report full participation in ADLs and IADLs without restrictions related to L knee.    PLAN     Progress per POC    Jessica Kevin, PT, DPT

## 2023-10-09 ENCOUNTER — CLINICAL SUPPORT (OUTPATIENT)
Dept: REHABILITATION | Facility: HOSPITAL | Age: 28
End: 2023-10-09
Payer: MEDICAID

## 2023-10-09 ENCOUNTER — TELEPHONE (OUTPATIENT)
Dept: REHABILITATION | Facility: HOSPITAL | Age: 28
End: 2023-10-09
Payer: MEDICAID

## 2023-10-09 DIAGNOSIS — R29.898 DECREASED STRENGTH OF LOWER EXTREMITY: ICD-10-CM

## 2023-10-09 DIAGNOSIS — R26.9 GAIT ABNORMALITY: Primary | ICD-10-CM

## 2023-10-09 DIAGNOSIS — M25.662 DECREASED RANGE OF MOTION OF LEFT KNEE: ICD-10-CM

## 2023-10-09 PROCEDURE — 97110 THERAPEUTIC EXERCISES: CPT

## 2023-10-09 NOTE — PROGRESS NOTES
"Re-assessment/ update POC [facility]   Physical Therapy Treatment Note     Name: Trinity Peres  [facility] Number: [id number]    Therapy Diagnosis:   Encounter Diagnoses   Name Primary?    Gait abnormality Yes    Decreased range of motion of left knee     Decreased strength of lower extremity        Physician: Fabiola Pool, *    Visit Date: 10/9/2023    Physician: Fabiola Pool, *     Physician Orders: PT Eval and Treat  Medical Diagnosis from Referral:   Diagnosis   S83.512D (ICD-10-CM) - Rupture of anterior cruciate ligament of left knee, subsequent encounter   S83.242D (ICD-10-CM) - Acute medial meniscus tear, left, subsequent encounter      Evaluation Date: 6/13/2023  Authorization Period Expiration: 09/12/2023  Plan of Care Expiration: 12/31/2023  Progress Note Due: 7/11/2023   Visit # / Visits authorized: 29/30 (+ 28)  FOTO #1 given at eval     Time In: 1119 am  Time Out: 1205 pm  Total Billable Time: 46 minutes - medicaid     DOS: 6/12/23  S/p: L ACLr  PROCEDURE:    Arthroscopic assisted ACL reconstruction using bone patellar tendon bone autograft  Bone grafting left tibia and patella  Post-Op Precautions: TTWB for another week, then may increase to 50/50 at week 4, then 100 from week 5-6), ROM 0-90 until 4 weeks post op, then advance to 120     Precautions: Standard, Weightbearing, and post op ACLr      SUBJECTIVE     Pt reports: knee has been feeling good. No pain with any HEP.  Response to previous treatment: improved quad activation  Functional change: no functional change    Pain: 3/10  Location: left knee      OBJECTIVE   17 weeks s/p     Gait: ambulates without brace, good quad control for 10 minutes of walking prior to return of flexed knee gait compensations    Knee Passive Range of Motion:    Right  Left    Flexion 141 140   Extension Hyper 8 Hyper 8     Quad Set: good control of hyperextension after cuing  SLR: min lag, improves w/ cuing    6" step down: " "dynamic knee valgus 3/10 pain (improved w/ open chain quad strengthening)     Joint Mobility: hypomobile patella in all directions and hypomobile infrapatellar fat pad     Knee Extension Iso Right Left Affected Limb % symmetry % deficit   Crane scale 60 deg flex  53.2 kg  46.7 kg  48.9 kg 14.9 kg  15.3 kg  17.1 kg*  R 32% 68%     Treatment       Trinity received the treatments listed below:      THERAPEUTIC EXERCISES to develop strength, endurance, ROM, flexibility, posture, and core stabilization for 5 minutes including:     Hinge stretch; 4x 15"     NP Today:   Prone quad stretch; 3 x 20"  Heel prop; 5# above and below; 6 min   Upright bike; 8 min Lv 4.0 for aerobic activity    MANUAL THERAPY TECHNIQUES including Joint mobilizations and Soft tissue Mobilization were applied to left knee for 05 minutes.  Fat pad mob at 0 and 2o deg   Patellar mob Gr 3-4 in all directions  Seat EOT passive flexion with inf patellar glide 2 min    NEUROMUSCULAR RE-EDUCATION ACTIVITIES to improve Balance, Coordination, Kinesthetic, Sense, Proprioception, and Posture for  36 minutes.  The following were included:   SAQ 10 x 10" x 2   Quad set into hyper; 10x w/ 10" hold x 2  SLR 3 x 10  Knee ext machine 20# 4x10  6" lateral step down 4x8 (dowel UE assist)       NP Today:  Soleus piliates chair; 2 springs; 20x   DL HS curl 25#; 3 x 15  Step up 6"; 3 x 12  BFR 80% occulusion OCK, 70% occulusion CKC 30/15/15/15   - SLR   - Prone HS curl YTB   - LAQ BTB  SL hip abd; 2 x 15   Balance board DL squat; 3 min lat; 3 min fwd/bkwd    THERAPEUTIC ACTIVITIES to improve dynamic and functional performance for 0 minutes including.    NP Today:   Spilt squat; 3 x 10 B  Box squat; 24"; 2 x 10 w/ 3" hold  HS curl; 15#; 3 x 15  SL Leg Press; 60#; 3 x 15  Monster walks; YTB; 15 ft x 2 laps- NP  Hammer strength knee ext; 40-90; 7.5# (closer pin); 3 x 10      GAIT TRAINING to improve functional mobility and safety for 00 minutes.       Patient Education and " Home Exercises     Home Exercises Provided and Patient Education Provided     Education provided:   - prognosis  - diagnosis  - continued HEP  - s/p precautions: DVT, infection, bandaging    Written Home Exercises Provided: yes. Exercises were reviewed and Trinity was able to demonstrate them prior to the end of the session.  Trinity demonstrated good  understanding of the education provided. See EMR under Patient Instructions for exercises provided during therapy sessions    ASSESSMENT   Trinity presents lacking min ROM into extension. After manual/ cuing pt demonstrates improved active hyperextension, performs SLR with min lag. Pt tolerates open chain fairly well after fat pad/ patellar mobilization. Pt educated on supplementing strengthening at the gym. Will re-assess ROM/quad tone next visit and progress as tolerated. Pt will benefit from continued skilled therapy to improve strength and ROM in order to improve tolerance to stair navigation, transfers and ADL's and to dec risk for re-injury    Trinity Is progressing well towards her goals.   Pt prognosis is Good.     Pt will continue to benefit from skilled outpatient physical therapy to address the deficits listed in the problem list box on initial evaluation, provide pt/family education and to maximize pt's level of independence in the home and community environment.     Pt's spiritual, cultural and educational needs considered and pt agreeable to plan of care and goals.     Anticipated barriers to physical therapy: none    Goals:  Short Term Goals: 8-10 weeks   1. Pt will be compliant with HEP 50% of prescribed amount. - met   2. The pt to demo improvement in L knee ROM to equal R knee ROM pain free -----progressing not met 10/9/2023  3.  The pt to demo good quad set with proper hyperextension moment of the L knee -----progressing not met 10/9/2023  4. The pt to demo ambualting with elast restricitve AD witout major compensatory pattern L knee for at least 20  feet  - met     Long Term Goals: 6 months  (Progressing, not met)   Pt will be compliant with % of prescribed amount.   Patient will improve their FOTO limitation score to at least 17% as evidence of clinically significant improvements in their function.  The pt to demo pain free and uncompensated walking mechanics x10 min on an indoor treadmill  The pt to demo tolerance to a squat at or bellow parallel with uncompensated mechanics x10   The pt to demo strength of L LE within 10% of R LE as demo'd on the biodex machine   The pt will report full participation in ADLs and IADLs without restrictions related to L knee.    PLAN     Plan of care Certification: 6/13/2023 to 12/31/2023.     Outpatient Physical Therapy 2 times weekly for 12 weeks to include the following interventions: Electrical Stimulation NMES, Gait Training, Manual Therapy, Neuromuscular Re-ed, Patient Education, Self Care, Therapeutic Activities, and Therapeutic Exercise.     Severo Berrios, PT, DPT

## 2023-10-10 NOTE — PLAN OF CARE
"Re-assessment/ update POC [facility]   Physical Therapy Treatment Note     Name: Trinity Peres  [facility] Number: [id number]    Therapy Diagnosis:   Encounter Diagnoses   Name Primary?    Gait abnormality Yes    Decreased range of motion of left knee     Decreased strength of lower extremity        Physician: Fabiola Pool, *    Visit Date: 10/9/2023    Physician: Fabiola Pool, *     Physician Orders: PT Eval and Treat  Medical Diagnosis from Referral:   Diagnosis   S83.512D (ICD-10-CM) - Rupture of anterior cruciate ligament of left knee, subsequent encounter   S83.242D (ICD-10-CM) - Acute medial meniscus tear, left, subsequent encounter      Evaluation Date: 6/13/2023  Authorization Period Expiration: 09/12/2023  Plan of Care Expiration: 12/31/2023  Progress Note Due: 7/11/2023   Visit # / Visits authorized: 29/30 (+ 28)  FOTO #1 given at eval     Time In: 1119 am  Time Out: 1205 pm  Total Billable Time: 46 minutes - medicaid     DOS: 6/12/23  S/p: L ACLr  PROCEDURE:    Arthroscopic assisted ACL reconstruction using bone patellar tendon bone autograft  Bone grafting left tibia and patella  Post-Op Precautions: TTWB for another week, then may increase to 50/50 at week 4, then 100 from week 5-6), ROM 0-90 until 4 weeks post op, then advance to 120     Precautions: Standard, Weightbearing, and post op ACLr      SUBJECTIVE     Pt reports: knee has been feeling good. No pain with any HEP.  Response to previous treatment: improved quad activation  Functional change: no functional change    Pain: 3/10  Location: left knee      OBJECTIVE   17 weeks s/p     Gait: ambulates without brace, good quad control for 10 minutes of walking prior to return of flexed knee gait compensations    Knee Passive Range of Motion:    Right  Left    Flexion 141 140   Extension Hyper 8 Hyper 8     Quad Set: good control of hyperextension after cuing  SLR: min lag, improves w/ cuing    6" step down: " "dynamic knee valgus 3/10 pain (improved w/ open chain quad strengthening)     Joint Mobility: hypomobile patella in all directions and hypomobile infrapatellar fat pad     Knee Extension Iso Right Left Affected Limb % symmetry % deficit   Crane scale 60 deg flex  53.2 kg  46.7 kg  48.9 kg 14.9 kg  15.3 kg  17.1 kg*  R 32% 68%     Treatment       Trinity received the treatments listed below:      THERAPEUTIC EXERCISES to develop strength, endurance, ROM, flexibility, posture, and core stabilization for 5 minutes including:     Hinge stretch; 4x 15"     NP Today:   Prone quad stretch; 3 x 20"  Heel prop; 5# above and below; 6 min   Upright bike; 8 min Lv 4.0 for aerobic activity    MANUAL THERAPY TECHNIQUES including Joint mobilizations and Soft tissue Mobilization were applied to left knee for 05 minutes.  Fat pad mob at 0 and 2o deg   Patellar mob Gr 3-4 in all directions  Seat EOT passive flexion with inf patellar glide 2 min    NEUROMUSCULAR RE-EDUCATION ACTIVITIES to improve Balance, Coordination, Kinesthetic, Sense, Proprioception, and Posture for  36 minutes.  The following were included:   SAQ 10 x 10" x 2   Quad set into hyper; 10x w/ 10" hold x 2  SLR 3 x 10  Knee ext machine 20# 4x10  6" lateral step down 4x8 (dowel UE assist)       NP Today:  Soleus piliates chair; 2 springs; 20x   DL HS curl 25#; 3 x 15  Step up 6"; 3 x 12  BFR 80% occulusion OCK, 70% occulusion CKC 30/15/15/15   - SLR   - Prone HS curl YTB   - LAQ BTB  SL hip abd; 2 x 15   Balance board DL squat; 3 min lat; 3 min fwd/bkwd    THERAPEUTIC ACTIVITIES to improve dynamic and functional performance for 0 minutes including.    NP Today:   Spilt squat; 3 x 10 B  Box squat; 24"; 2 x 10 w/ 3" hold  HS curl; 15#; 3 x 15  SL Leg Press; 60#; 3 x 15  Monster walks; YTB; 15 ft x 2 laps- NP  Hammer strength knee ext; 40-90; 7.5# (closer pin); 3 x 10      GAIT TRAINING to improve functional mobility and safety for 00 minutes.       Patient Education and " Home Exercises     Home Exercises Provided and Patient Education Provided     Education provided:   - prognosis  - diagnosis  - continued HEP  - s/p precautions: DVT, infection, bandaging    Written Home Exercises Provided: yes. Exercises were reviewed and Trinity was able to demonstrate them prior to the end of the session.  Trinity demonstrated good  understanding of the education provided. See EMR under Patient Instructions for exercises provided during therapy sessions    ASSESSMENT   Trinity presents lacking min ROM into extension. After manual/ cuing pt demonstrates improved active hyperextension, performs SLR with min lag. Pt tolerates open chain fairly well after fat pad/ patellar mobilization. Pt educated on supplementing strengthening at the gym. Will re-assess ROM/quad tone next visit and progress as tolerated. Pt will benefit from continued skilled therapy to improve strength and ROM in order to improve tolerance to stair navigation, transfers and ADL's and to dec risk for re-injury    Trinity Is progressing well towards her goals.   Pt prognosis is Good.     Pt will continue to benefit from skilled outpatient physical therapy to address the deficits listed in the problem list box on initial evaluation, provide pt/family education and to maximize pt's level of independence in the home and community environment.     Pt's spiritual, cultural and educational needs considered and pt agreeable to plan of care and goals.     Anticipated barriers to physical therapy: none    Goals:  Short Term Goals: 8-10 weeks   1. Pt will be compliant with HEP 50% of prescribed amount. - met   2. The pt to demo improvement in L knee ROM to equal R knee ROM pain free -----progressing not met 10/9/2023  3.  The pt to demo good quad set with proper hyperextension moment of the L knee -----progressing not met 10/9/2023  4. The pt to demo ambualting with elast restricitve AD witout major compensatory pattern L knee for at least 20  feet  - met     Long Term Goals: 6 months  (Progressing, not met)   Pt will be compliant with % of prescribed amount.   Patient will improve their FOTO limitation score to at least 17% as evidence of clinically significant improvements in their function.  The pt to demo pain free and uncompensated walking mechanics x10 min on an indoor treadmill  The pt to demo tolerance to a squat at or bellow parallel with uncompensated mechanics x10   The pt to demo strength of L LE within 10% of R LE as demo'd on the biodex machine   The pt will report full participation in ADLs and IADLs without restrictions related to L knee.    PLAN     Plan of care Certification: 6/13/2023 to 12/31/2023.     Outpatient Physical Therapy 2 times weekly for 12 weeks to include the following interventions: Electrical Stimulation NMES, Gait Training, Manual Therapy, Neuromuscular Re-ed, Patient Education, Self Care, Therapeutic Activities, and Therapeutic Exercise.     Severo Berrios, PT, DPT

## 2023-10-12 ENCOUNTER — CLINICAL SUPPORT (OUTPATIENT)
Dept: REHABILITATION | Facility: HOSPITAL | Age: 28
End: 2023-10-12
Payer: MEDICAID

## 2023-10-12 DIAGNOSIS — R29.898 DECREASED STRENGTH OF LOWER EXTREMITY: ICD-10-CM

## 2023-10-12 DIAGNOSIS — M25.662 DECREASED RANGE OF MOTION OF LEFT KNEE: ICD-10-CM

## 2023-10-12 DIAGNOSIS — R26.9 GAIT ABNORMALITY: Primary | ICD-10-CM

## 2023-10-12 PROCEDURE — 97530 THERAPEUTIC ACTIVITIES: CPT

## 2023-10-12 PROCEDURE — 97110 THERAPEUTIC EXERCISES: CPT

## 2023-10-12 PROCEDURE — 97112 NEUROMUSCULAR REEDUCATION: CPT

## 2023-10-12 NOTE — PROGRESS NOTES
[facility]   Physical Therapy Treatment Note     Name: Trinity Peres  [facility] Number: [id number]    Therapy Diagnosis:   Encounter Diagnoses   Name Primary?    Gait abnormality Yes    Decreased range of motion of left knee     Decreased strength of lower extremity      Physician: Fabiola Pool, *    Visit Date: 10/12/2023    Physician: Fabiola Pool, *     Physician Orders: PT Eval and Treat  Medical Diagnosis from Referral:   Diagnosis   S83.512D (ICD-10-CM) - Rupture of anterior cruciate ligament of left knee, subsequent encounter   S83.242D (ICD-10-CM) - Acute medial meniscus tear, left, subsequent encounter      Evaluation Date: 6/13/2023  Authorization Period Expiration: 09/12/2023  Plan of Care Expiration: 12/31/2023  Progress Note Due: 7/11/2023   Visit # / Visits authorized: 30/30 (+ 28)  FOTO #1 given at eval     Time In: 1000am  Time Out: 1100 pm  Total Billable Time: 60 minutes - medicaid     DOS: 6/12/23  S/p: L ACLr  PROCEDURE:    Arthroscopic assisted ACL reconstruction using bone patellar tendon bone autograft  Bone grafting left tibia and patella  Post-Op Precautions: TTWB for another week, then may increase to 50/50 at week 4, then 100 from week 5-6), ROM 0-90 until 4 weeks post op, then advance to 120     Precautions: Standard, Weightbearing, and post op ACLr      SUBJECTIVE     Pt reports: no soreness after last visit, went to gym yesterday and tolerated well. Wore T-scope for car ride so knee didn't get stiff in flexion.  Response to previous treatment: improved quad activation  Functional change: no functional change    Pain: 3/10  Location: left knee      OBJECTIVE   17 weeks s/p     Gait: Pt presents in T-scope today locked in ext (so knee didn't get stiff on ride over)    Knee Passive Range of Motion:    Right  Left    Flexion 141 140   Extension Hyper 8 Hyper 8     Quad Set: good control of hyperextension after cuing  SLR: min lag, improves w/  "cuing     Joint Mobility: hypomobile patella in all directions and hypomobile infrapatellar fat pad     Knee Extension Iso Right Left Affected Limb % symmetry % deficit   Crane scale 60 deg flex  53.2 kg  46.7 kg  48.9 kg 14.9 kg  15.3 kg  17.1 kg*  R 32% 68%     Treatment       Trinity received the treatments listed below:      THERAPEUTIC EXERCISES to develop strength, endurance, ROM, flexibility, posture, and core stabilization for 10 minutes including:     Upright bike; 7 min Lv 4.0 for aerobic activity  Hinge stretch; 4x 15"     NP Today:   Prone quad stretch; 3 x 20"  Heel prop; 5# above and below; 6 min       MANUAL THERAPY TECHNIQUES including Joint mobilizations and Soft tissue Mobilization were applied to left knee for 05 minutes.  Fat pad mob at 0 and 2o deg   Patellar mob Gr 3-4 in all directions  Seat EOT passive flexion with inf patellar glide 2 min    NEUROMUSCULAR RE-EDUCATION ACTIVITIES to improve Balance, Coordination, Kinesthetic, Sense, Proprioception, and Posture for  35 minutes.  The following were included:     SAQ 10 x 10" x 2   Quad set into hyper; 10x w/ 10" hold x 2  SL hip abd; 2 x 15   SLR 3 x 10  SL bridge 3x10  Knee ext machine 10# 4x10        NP Today:  Soleus piliates chair; 2 springs; 20x   DL HS curl 25#; 3 x 15    BFR 80% occulusion OCK, 70% occulusion CKC 30/15/15/15   - SLR   - Prone HS curl YTB   - LAQ BTB    Balance board DL squat; 3 min lat; 3 min fwd/bkwd    THERAPEUTIC ACTIVITIES to improve dynamic and functional performance for 10 minutes including.    8" step up 4x8   2" step down w/ TRX assist 4x8    NP Today:   Spilt squat; 3 x 10 B  Box squat; 24"; 2 x 10 w/ 3" hold  HS curl; 15#; 3 x 15  SL Leg Press; 60#; 3 x 15  Monster walks; YTB; 15 ft x 2 laps- NP  Hammer strength knee ext; 40-90; 7.5# (closer pin); 3 x 10      GAIT TRAINING to improve functional mobility and safety for 00 minutes.       Patient Education and Home Exercises     Home Exercises Provided and Patient " Education Provided     Education provided:   - prognosis  - diagnosis  - continued HEP  - s/p precautions: DVT, infection, bandaging    Written Home Exercises Provided: yes. Exercises were reviewed and Trinity was able to demonstrate them prior to the end of the session.  Trinity demonstrated good  understanding of the education provided. See EMR under Patient Instructions for exercises provided during therapy sessions    ASSESSMENT   Trinity presents w/ improved carryover of ROM from last visit. Pt silvia progressed open/close chain strength exercises well w/ out reports of pain. Pt educated on progressed gym program. Will continue to progress strength as tolerated according to protocol    Trinity Is progressing well towards her goals.   Pt prognosis is Good.     Pt will continue to benefit from skilled outpatient physical therapy to address the deficits listed in the problem list box on initial evaluation, provide pt/family education and to maximize pt's level of independence in the home and community environment.     Pt's spiritual, cultural and educational needs considered and pt agreeable to plan of care and goals.     Anticipated barriers to physical therapy: none    Goals:  Short Term Goals: 8-10 weeks   1. Pt will be compliant with HEP 50% of prescribed amount. - met   2. The pt to demo improvement in L knee ROM to equal R knee ROM pain free -----progressing not met 10/12/2023  3.  The pt to demo good quad set with proper hyperextension moment of the L knee -----progressing not met 10/12/2023  4. The pt to demo ambualting with elast restricitve AD witout major compensatory pattern L knee for at least 20 feet  - met     Long Term Goals: 6 months  (Progressing, not met)   Pt will be compliant with % of prescribed amount.   Patient will improve their FOTO limitation score to at least 17% as evidence of clinically significant improvements in their function.  The pt to demo pain free and uncompensated walking  mechanics x10 min on an indoor treadmill  The pt to demo tolerance to a squat at or bellow parallel with uncompensated mechanics x10   The pt to demo strength of L LE within 10% of R LE as demo'd on the biodex machine   The pt will report full participation in ADLs and IADLs without restrictions related to L knee.    PLAN     Plan of care Certification: 6/13/2023 to 12/31/2023.     Outpatient Physical Therapy 2 times weekly for 12 weeks to include the following interventions: Electrical Stimulation NMES, Gait Training, Manual Therapy, Neuromuscular Re-ed, Patient Education, Self Care, Therapeutic Activities, and Therapeutic Exercise.     Severo Berrios, PT, DPT

## 2023-10-19 ENCOUNTER — CLINICAL SUPPORT (OUTPATIENT)
Dept: REHABILITATION | Facility: HOSPITAL | Age: 28
End: 2023-10-19
Payer: MEDICAID

## 2023-10-19 DIAGNOSIS — R29.898 DECREASED STRENGTH OF LOWER EXTREMITY: ICD-10-CM

## 2023-10-19 DIAGNOSIS — R26.9 GAIT ABNORMALITY: Primary | ICD-10-CM

## 2023-10-19 DIAGNOSIS — M25.662 DECREASED RANGE OF MOTION OF LEFT KNEE: ICD-10-CM

## 2023-10-19 PROCEDURE — 97530 THERAPEUTIC ACTIVITIES: CPT

## 2023-10-19 PROCEDURE — 97112 NEUROMUSCULAR REEDUCATION: CPT

## 2023-10-19 PROCEDURE — 97110 THERAPEUTIC EXERCISES: CPT

## 2023-10-19 NOTE — PROGRESS NOTES
[facility]   Physical Therapy Treatment Note     Name: Trinity Peres  [facility] Number: [id number]    Therapy Diagnosis:   Encounter Diagnoses   Name Primary?    Gait abnormality Yes    Decreased range of motion of left knee     Decreased strength of lower extremity      Physician: Fabiola Pool, *    Visit Date: 10/19/2023    Physician: Fabiola Pool, *     Physician Orders: PT Eval and Treat  Medical Diagnosis from Referral:   Diagnosis   S83.512D (ICD-10-CM) - Rupture of anterior cruciate ligament of left knee, subsequent encounter   S83.242D (ICD-10-CM) - Acute medial meniscus tear, left, subsequent encounter      Evaluation Date: 6/13/2023  Authorization Period Expiration: 09/12/2023  Plan of Care Expiration: 12/31/2023  Progress Note Due: 7/11/2023   Visit # / Visits authorized: pending  FOTO #1 given at eval     Time In: 1000am  Time Out: 1100 pm  Total Billable Time: 60 minutes - medicaid     DOS: 6/12/23  S/p: L ACLr  PROCEDURE:    Arthroscopic assisted ACL reconstruction using bone patellar tendon bone autograft  Bone grafting left tibia and patella  Post-Op Precautions: TTWB for another week, then may increase to 50/50 at week 4, then 100 from week 5-6), ROM 0-90 until 4 weeks post op, then advance to 120     Precautions: Standard, Weightbearing, and post op ACLr      SUBJECTIVE     Pt reports: she tolerated last visit well. Pt endorses inc knee swelling over the weekend. Denies any preceding trauma and reports prior workout felt good. Swelling has since resolved.  Response to previous treatment: improved quad activation  Functional change: no functional change    Pain: 3/10  Location: left knee      OBJECTIVE     18 weeks s/p     Gait: Pt presents in T-scope today locked in ext (so knee didn't get stiff on ride over)    Knee Passive Range of Motion:    Right  Left    Flexion 141 140   Extension Hyper 8 Hyper 8     Quad Set: good control of hyperextension after  "cuing  SLR: min lag, improves w/ cuing     Joint Mobility: hypomobile patella in all directions and hypomobile infrapatellar fat pad     Knee Extension Iso Right Left Affected Limb % symmetry % deficit   Crane scale 60 deg flex  53.2 kg  46.7 kg  48.9 kg 14.9 kg  15.3 kg  17.1 kg*  R 32% 68%     Treatment       Trinity received the treatments listed below:      THERAPEUTIC EXERCISES to develop strength, endurance, ROM, flexibility, posture, and core stabilization for 10 minutes including:     Upright bike; 7 min Lv 4.0 for aerobic activity  Hinge stretch; 4x 15"     NP Today:   Prone quad stretch; 3 x 20"  Heel prop; 5# above and below; 6 min       MANUAL THERAPY TECHNIQUES including Joint mobilizations and Soft tissue Mobilization were applied to left knee for 02 minutes.  Fat pad mob at 0 and 2o deg   Patellar mob Gr 3-4 in all directions  Seat EOT passive flexion with inf patellar glide 2 min    NEUROMUSCULAR RE-EDUCATION ACTIVITIES to improve Balance, Coordination, Kinesthetic, Sense, Proprioception, and Posture for  35 minutes.  The following were included:     Quad isometric at 60 deg flexion 30" hold x4  Side steps w/ GTB 3 laps  Quad set into hyper; 10x w/ 10" hold x 2  SLR 3 x 10  SL hip abd; 2 x 15   SL bridge 3x10    NP Today:  Knee ext machine 10# 4x10  Soleus piliates chair; 2 springs; 20x   DL HS curl 25#; 3 x 15  SAQ 10 x 10" x 2   BFR 80% occulusion OCK, 70% occulusion CKC 30/15/15/15   - SLR   - Prone HS curl YTB   - LAQ BTB    Balance board DL squat; 3 min lat; 3 min fwd/bkwd    THERAPEUTIC ACTIVITIES to improve dynamic and functional performance for 13 minutes including.    6" step up 4x8   TRX forward lunch 3x10  Lateral lunge w/ 20# 3x10    NP Today:   Spilt squat; 3 x 10 B  Box squat; 24"; 2 x 10 w/ 3" hold  HS curl; 15#; 3 x 15  SL Leg Press; 60#; 3 x 15  Monster walks; YTB; 15 ft x 2 laps- NP  Hammer strength knee ext; 40-90; 7.5# (closer pin); 3 x 10      GAIT TRAINING to improve functional " mobility and safety for 00 minutes.       Patient Education and Home Exercises     Home Exercises Provided and Patient Education Provided     Education provided:   - prognosis  - diagnosis  - continued HEP  - s/p precautions: DVT, infection, bandaging    Written Home Exercises Provided: yes. Exercises were reviewed and Trinity was able to demonstrate them prior to the end of the session.  Trinity demonstrated good  understanding of the education provided. See EMR under Patient Instructions for exercises provided during therapy sessions    ASSESSMENT   Trinity presents w/ good carryover of ROM, minimal swelling noted at knee today. Pt tolerates isometrics well. Pt tolerates progressed CKC strengthening without reports of pain. Will continue to progress strength as tolerated per protocol    Trinity Is progressing well towards her goals.   Pt prognosis is Good.     Pt will continue to benefit from skilled outpatient physical therapy to address the deficits listed in the problem list box on initial evaluation, provide pt/family education and to maximize pt's level of independence in the home and community environment.     Pt's spiritual, cultural and educational needs considered and pt agreeable to plan of care and goals.     Anticipated barriers to physical therapy: none    Goals:  Short Term Goals: 8-10 weeks   1. Pt will be compliant with HEP 50% of prescribed amount. - met   2. The pt to demo improvement in L knee ROM to equal R knee ROM pain free -----progressing not met 10/19/2023  3.  The pt to demo good quad set with proper hyperextension moment of the L knee -----progressing not met 10/19/2023  4. The pt to demo ambualting with elast restricitve AD witout major compensatory pattern L knee for at least 20 feet  - met     Long Term Goals: 6 months  (Progressing, not met)   Pt will be compliant with % of prescribed amount.   Patient will improve their FOTO limitation score to at least 17% as evidence of  clinically significant improvements in their function.  The pt to demo pain free and uncompensated walking mechanics x10 min on an indoor treadmill  The pt to demo tolerance to a squat at or bellow parallel with uncompensated mechanics x10   The pt to demo strength of L LE within 10% of R LE as demo'd on the biodex machine   The pt will report full participation in ADLs and IADLs without restrictions related to L knee.    PLAN     Plan of care Certification: 6/13/2023 to 12/31/2023.     Outpatient Physical Therapy 2 times weekly for 12 weeks to include the following interventions: Electrical Stimulation NMES, Gait Training, Manual Therapy, Neuromuscular Re-ed, Patient Education, Self Care, Therapeutic Activities, and Therapeutic Exercise.     Severo Berrios, PT, DPT

## 2023-10-23 ENCOUNTER — CLINICAL SUPPORT (OUTPATIENT)
Dept: REHABILITATION | Facility: HOSPITAL | Age: 28
End: 2023-10-23
Payer: MEDICAID

## 2023-10-23 DIAGNOSIS — R29.898 DECREASED STRENGTH OF LOWER EXTREMITY: ICD-10-CM

## 2023-10-23 DIAGNOSIS — R26.9 GAIT ABNORMALITY: Primary | ICD-10-CM

## 2023-10-23 DIAGNOSIS — M25.662 DECREASED RANGE OF MOTION OF LEFT KNEE: ICD-10-CM

## 2023-10-23 PROCEDURE — 97110 THERAPEUTIC EXERCISES: CPT

## 2023-10-23 NOTE — PROGRESS NOTES
[facility]   Physical Therapy Treatment Note     Name: Trinity Peres  [facility] Number: [id number]    Therapy Diagnosis:   Encounter Diagnoses   Name Primary?    Gait abnormality Yes    Decreased range of motion of left knee     Decreased strength of lower extremity        Physician: Fabiola Pool, *    Visit Date: 10/23/2023    Physician: Fabiola Pool, *     Physician Orders: PT Eval and Treat  Medical Diagnosis from Referral:   Diagnosis   S83.512D (ICD-10-CM) - Rupture of anterior cruciate ligament of left knee, subsequent encounter   S83.242D (ICD-10-CM) - Acute medial meniscus tear, left, subsequent encounter      Evaluation Date: 6/13/2023  Authorization Period Expiration: 09/12/2023  Plan of Care Expiration: 12/31/2023  Progress Note Due: 7/11/2023   Visit # / Visits authorized: pending  FOTO #1 given at eval     Time In: 1100 am  Time Out: 1200 pm  Total Billable Time: 60 minutes - medicaid     DOS: 6/12/23  S/p: L ACLr  PROCEDURE:    Arthroscopic assisted ACL reconstruction using bone patellar tendon bone autograft  Bone grafting left tibia and patella  Post-Op Precautions: TTWB for another week, then may increase to 50/50 at week 4, then 100 from week 5-6), ROM 0-90 until 4 weeks post op, then advance to 120     Precautions: Standard, Weightbearing, and post op ACLr      SUBJECTIVE     Pt reports: she has been having increased swelling on and off over the last 2 weeks and is having a hard time straightening her knee today  Response to previous treatment: improved quad activation  Functional change: no functional change    Pain: 3/10  Location: left knee      OBJECTIVE     19 weeks s/p as of 10/23     Gait: Pt presents in T-scope today locked in ext (so knee didn't get stiff on ride over)    Knee Passive Range of Motion:    Right  Left    Flexion 141 140   Extension Hyper 8 Hyper 8     Quad Set: good control of hyperextension after cuing  SLR: min lag, improves w/  "cuing     Joint Mobility: hypomobile patella in all directions and hypomobile infrapatellar fat pad     Knee Extension Iso Right Left Affected Limb % symmetry % deficit   Crane scale 60 deg flex  53.2 kg  46.7 kg  48.9 kg 14.9 kg  15.3 kg  17.1 kg*  R 32% 68%     Treatment       Trinity received the treatments listed below:      THERAPEUTIC EXERCISES to develop strength, endurance, ROM, flexibility, posture, and core stabilization for 35 minutes including:   Upright bike;8 min Lv 8.0 for aerobic activity; RPM > 70  Hinge stretch; 4x 15"   Hammer strength knee ext; 7.5# (closer pin); 3 x 10  DL HS curl 25#; 3 x 15    NP Today:   Prone quad stretch; 3 x 20"  Heel prop; 5# above and below; 6 min       MANUAL THERAPY TECHNIQUES including Joint mobilizations and Soft tissue Mobilization were applied to left knee for 10 minutes.  Fat pad mob at 0 and 2o deg   Patellar mob Gr 3-4 in all directions  Seat EOT passive flexion with inf patellar glide 2 min    NEUROMUSCULAR RE-EDUCATION ACTIVITIES to improve Balance, Coordination, Kinesthetic, Sense, Proprioception, and Posture for  15 minutes.  The following were included:   Quad isometric at 60 deg flexion 30" hold x4  Quad set into hyper; 10x w/ 10" hold x 2  SLR 3 x 10      NP Today:  SAQ 10 x 10" x 2   SL hip abd; 2 x 15   SL bridge 3x10  Side steps w/ GTB 3 laps  Balance board DL squat; 3 min lat; 3 min fwd/bkwd    THERAPEUTIC ACTIVITIES to improve dynamic and functional performance for 00 minutes including.    NP Today:   Spilt squat; 3 x 10 B  Box squat; 24"; 2 x 10 w/ 3" hold  HS curl; 15#; 3 x 15  SL Leg Press; 60#; 3 x 15  Monster walks; YTB; 15 ft x 2 laps- NP  6" step up 4x8   TRX forward lunge 3x10  Lateral lunge w/ 20# 3x10      Patient Education and Home Exercises     Home Exercises Provided and Patient Education Provided     Education provided:   - prognosis  - diagnosis  - continued HEP  - s/p precautions: DVT, infection, bandaging    Written Home Exercises " Provided: yes. Exercises were reviewed and Trinity was able to demonstrate them prior to the end of the session.  Trinity demonstrated good  understanding of the education provided. See EMR under Patient Instructions for exercises provided during therapy sessions    ASSESSMENT   Trinity will benefit continued heavy quad strengthening with emphasis on functional movements. She continues with intermittent swelling following quad fatigue and will benefit continued [place] and CKC strengthening of quad and HS    Trinity Is progressing well towards her goals.   Pt prognosis is Good.     Pt will continue to benefit from skilled outpatient physical therapy to address the deficits listed in the problem list box on initial evaluation, provide pt/family education and to maximize pt's level of independence in the home and community environment.     Pt's spiritual, cultural and educational needs considered and pt agreeable to plan of care and goals.     Anticipated barriers to physical therapy: none    Goals:  Short Term Goals: 8-10 weeks   1. Pt will be compliant with HEP 50% of prescribed amount- MET   2. The pt to demo improvement in L knee ROM to equal R knee ROM pain free -----progressing not met  3.  The pt to demo good quad set with proper hyperextension moment of the L knee -----progressing not met   4. The pt to demo ambualting with elast restricitve AD witout major compensatory pattern L knee for at least 20 feet  - MET     Long Term Goals: 6 months    Pt will be compliant with % of prescribed amount. - MET  Patient will improve their FOTO limitation score to at least 17% as evidence of clinically significant improvements in their function.(Progressing, not met)   The pt to demo pain free and uncompensated walking mechanics x10 min on an indoor treadmill- MET  The pt to demo tolerance to a squat at or bellow parallel with uncompensated mechanics x10 (Progressing, not met)   The pt to demo strength of L LE within  10% of R LE as demo'd on the biodex machine (Progressing, not met)   The pt will report full participation in ADLs and IADLs without restrictions related to L knee.(Progressing, not met)     PLAN     Plan of care Certification: 6/13/2023 to 12/31/2023.     Outpatient Physical Therapy 2 times weekly for 12 weeks to include the following interventions: Electrical Stimulation NMES, Gait Training, Manual Therapy, Neuromuscular Re-ed, Patient Education, Self Care, Therapeutic Activities, and Therapeutic Exercise.     Jessica Kevin, PT, DPT

## 2023-11-12 ENCOUNTER — PATIENT MESSAGE (OUTPATIENT)
Dept: REHABILITATION | Facility: HOSPITAL | Age: 28
End: 2023-11-12
Payer: MEDICAID

## 2023-11-12 ENCOUNTER — PATIENT MESSAGE (OUTPATIENT)
Dept: ORTHOPEDICS | Facility: CLINIC | Age: 28
End: 2023-11-12
Payer: MEDICAID

## 2023-11-15 ENCOUNTER — CLINICAL SUPPORT (OUTPATIENT)
Dept: REHABILITATION | Facility: HOSPITAL | Age: 28
End: 2023-11-15
Payer: MEDICAID

## 2023-11-15 DIAGNOSIS — R26.9 GAIT ABNORMALITY: Primary | ICD-10-CM

## 2023-11-15 DIAGNOSIS — R29.898 DECREASED STRENGTH OF LOWER EXTREMITY: ICD-10-CM

## 2023-11-15 DIAGNOSIS — M25.662 DECREASED RANGE OF MOTION OF LEFT KNEE: ICD-10-CM

## 2023-11-15 PROCEDURE — 97110 THERAPEUTIC EXERCISES: CPT

## 2023-11-15 PROCEDURE — 97112 NEUROMUSCULAR REEDUCATION: CPT

## 2023-11-15 PROCEDURE — 97140 MANUAL THERAPY 1/> REGIONS: CPT

## 2023-11-15 NOTE — PROGRESS NOTES
[facility]   Physical Therapy Treatment Note     Name: Trinity Peres  [facility] Number: [id number]    Therapy Diagnosis:   Encounter Diagnoses   Name Primary?    Gait abnormality Yes    Decreased range of motion of left knee     Decreased strength of lower extremity        Physician: Fabiola Pool, *    Visit Date: 11/15/2023    Physician: Fabiola Pool, *     Physician Orders: PT Eval and Treat  Medical Diagnosis from Referral:   Diagnosis   S83.512D (ICD-10-CM) - Rupture of anterior cruciate ligament of left knee, subsequent encounter   S83.242D (ICD-10-CM) - Acute medial meniscus tear, left, subsequent encounter      Evaluation Date: 6/13/2023  Authorization Period Expiration: 09/12/2023  Plan of Care Expiration: 12/31/2023  Progress Note Due: 7/11/2023   Visit # / Visits authorized: pending  FOTO #1 given at eval     Time In: 1000 am  Time Out: 1100 am  Total Billable Time: 60 minutes - medicaid     DOS: 6/12/23  S/p: L ACLr  PROCEDURE:    Arthroscopic assisted ACL reconstruction using bone patellar tendon bone autograft  Bone grafting left tibia and patella  Post-Op Precautions: TTWB for another week, then may increase to 50/50 at week 4, then 100 from week 5-6), ROM 0-90 until 4 weeks post op, then advance to 120     Precautions: Standard, Weightbearing, and post op ACLr      SUBJECTIVE     Pt reports: she had pink eye and then the flu so hasn't been in and hasn't been able to go to the gym. Also reports significant swelling, stiffness, and soreness that has been persistent in her knee  Response to previous treatment: improved quad activation  Functional change: no functional change    Pain: 3/10  Location: left knee      OBJECTIVE     22 weeks and 2 days s/p as of 11/15    Knee Passive Range of Motion:    Right  Left    Flexion 141 140   Extension Hyper 10 Hyper 5     Quad Set: trace quad set, not active hyperextension        Joint Mobility: hypomobile patella in all  "directions and hypomobile infrapatellar fat pad       Treatment       Trinity received the treatments listed below:      THERAPEUTIC EXERCISES to develop strength, endurance, ROM, flexibility, posture, and core stabilization for 10 minutes including:   Heel prop; 5# above and below; 6 min   Hinge stretch; 20x 5"     NP Today:   Prone quad stretch; 3 x 20"  Upright bike;8 min Lv 8.0 for aerobic activity; RPM > 70  Hinge stretch; 4x 15"   Hammer strength knee ext; 7.5# (closer pin); 3 x 10  DL HS curl 25#; 3 x 15      MANUAL THERAPY TECHNIQUES including Joint mobilizations and Soft tissue Mobilization were applied to left knee for 15 minutes.  Fat pad mob at 0 and 2o deg   Patellar mob Gr 3-4 in all directions  Seat EOT passive flexion with inf patellar glide 2 min    NEUROMUSCULAR RE-EDUCATION ACTIVITIES to improve Balance, Coordination, Kinesthetic, Sense, Proprioception, and Posture for  35 minutes.  The following were included:   Quad isometric at 60 deg flexion 10" hold x 3' w/ mTrigger biofeedback  Quad set into hyper; 10" hold x 2' w/ mTrigger biofeedback  SAQ over 1/2 foam; NMES x 4'  Quad set in heel prop; NMES x 4'  TKE; NMES x 3'      NP Today:  SAQ 10 x 10" x 2   SL hip abd; 2 x 15   SL bridge 3x10  Side steps w/ GTB 3 laps  Balance board DL squat; 3 min lat; 3 min fwd/bkwd  SLR 3 x 10    THERAPEUTIC ACTIVITIES to improve dynamic and functional performance for 00 minutes including.    NP Today:   Spilt squat; 3 x 10 B  Box squat; 24"; 2 x 10 w/ 3" hold  HS curl; 15#; 3 x 15  SL Leg Press; 60#; 3 x 15  Monster walks; YTB; 15 ft x 2 laps- NP  6" step up 4x8   TRX forward lunge 3x10  Lateral lunge w/ 20# 3x10      Patient Education and Home Exercises     Home Exercises Provided and Patient Education Provided     Education provided:   - prognosis  - diagnosis  - continued HEP  - s/p precautions: DVT, infection, bandaging    Written Home Exercises Provided: yes. Exercises were reviewed and Trinity was able to " demonstrate them prior to the end of the session.  Trinity demonstrated good  understanding of the education provided. See EMR under Patient Instructions for exercises provided during therapy sessions    ASSESSMENT   Trinity presented with significant regression of knee extension range of motion as well as very poor active quad control. She responded well to NMES but was unable to to regain full hyperextension actively or passively by end of session. Contacted MD office to update on sudden regression.     Trinity Is progressing well towards her goals.   Pt prognosis is Good.     Pt will continue to benefit from skilled outpatient physical therapy to address the deficits listed in the problem list box on initial evaluation, provide pt/family education and to maximize pt's level of independence in the home and community environment.     Pt's spiritual, cultural and educational needs considered and pt agreeable to plan of care and goals.     Anticipated barriers to physical therapy: none    Goals:  Short Term Goals: 8-10 weeks   1. Pt will be compliant with HEP 50% of prescribed amount- MET   2. The pt to demo improvement in L knee ROM to equal R knee ROM pain free -----progressing not met  3.  The pt to demo good quad set with proper hyperextension moment of the L knee -----progressing not met   4. The pt to demo ambualting with elast restricitve AD witout major compensatory pattern L knee for at least 20 feet  - MET     Long Term Goals: 6 months    Pt will be compliant with % of prescribed amount. - MET  Patient will improve their FOTO limitation score to at least 17% as evidence of clinically significant improvements in their function.(Progressing, not met)   The pt to demo pain free and uncompensated walking mechanics x10 min on an indoor treadmill- MET  The pt to demo tolerance to a squat at or bellow parallel with uncompensated mechanics x10 (Progressing, not met)   The pt to demo strength of L LE within  10% of R LE as demo'd on the biodex machine (Progressing, not met)   The pt will report full participation in ADLs and IADLs without restrictions related to L knee.(Progressing, not met)     PLAN     Plan of care Certification: 6/13/2023 to 12/31/2023.     Outpatient Physical Therapy 2 times weekly for 12 weeks to include the following interventions: Electrical Stimulation NMES, Gait Training, Manual Therapy, Neuromuscular Re-ed, Patient Education, Self Care, Therapeutic Activities, and Therapeutic Exercise.     Jessica Kevin, PT, DPT

## 2023-11-20 ENCOUNTER — PATIENT MESSAGE (OUTPATIENT)
Dept: PRIMARY CARE CLINIC | Facility: CLINIC | Age: 28
End: 2023-11-20
Payer: MEDICAID

## 2023-11-22 ENCOUNTER — CLINICAL SUPPORT (OUTPATIENT)
Dept: REHABILITATION | Facility: HOSPITAL | Age: 28
End: 2023-11-22
Payer: MEDICAID

## 2023-11-22 DIAGNOSIS — R29.898 DECREASED STRENGTH OF LOWER EXTREMITY: ICD-10-CM

## 2023-11-22 DIAGNOSIS — M25.662 DECREASED RANGE OF MOTION OF LEFT KNEE: ICD-10-CM

## 2023-11-22 DIAGNOSIS — R26.9 GAIT ABNORMALITY: Primary | ICD-10-CM

## 2023-11-22 PROCEDURE — 97110 THERAPEUTIC EXERCISES: CPT

## 2023-11-22 NOTE — PROGRESS NOTES
[facility]   Physical Therapy Treatment Note     Name: Trinity Peres  [facility] Number: [id number]    Therapy Diagnosis:   No diagnosis found.      Physician: Fabiola Pool, *    Visit Date: 11/22/2023    Physician: Fabiola Pool, *     Physician Orders: PT Eval and Treat  Medical Diagnosis from Referral:   Diagnosis   S83.512D (ICD-10-CM) - Rupture of anterior cruciate ligament of left knee, subsequent encounter   S83.242D (ICD-10-CM) - Acute medial meniscus tear, left, subsequent encounter      Evaluation Date: 6/13/2023  Authorization Period Expiration: 09/12/2023  Plan of Care Expiration: 12/31/2023  Progress Note Due: 7/11/2023   Visit # / Visits authorized: pending  FOTO #1 given at eval     Time In: 1000 am  Time Out: 1100 am  Total Billable Time: 60 minutes - medicaid     DOS: 6/12/23  S/p: L ACLr  PROCEDURE:    Arthroscopic assisted ACL reconstruction using bone patellar tendon bone autograft  Bone grafting left tibia and patella  Post-Op Precautions: TTWB for another week, then may increase to 50/50 at week 4, then 100 from week 5-6), ROM 0-90 until 4 weeks post op, then advance to 120     Precautions: Standard, Weightbearing, and post op ACLr      SUBJECTIVE     Pt reports: her knee has been swollen and painful in the back  Response to previous treatment: improved quad activation  Functional change: no functional change    Pain: 3/10  Location: left knee      OBJECTIVE     22 weeks and 2 days s/p as of 11/15    Knee Passive Range of Motion:    Right  Left    Flexion 141 140   Extension Hyper 10 Hyper 5     Quad Set: trace quad set, not active hyperextension        Joint Mobility: hypomobile patella in all directions and hypomobile infrapatellar fat pad     11/22/23  Knee Extension Iso Right Left Affected Limb % symmetry % deficit   Crane scale 60 deg flex 36.2 kg 7.6 kg*  R 21% 79%       Special Tests:    Right Left   Valgus Stress Test - -    Varus Stress test -  "-   Lachman's test - -   Posterior drawer - -   Anterior drawer - -   Asael's Test - -      Meniscal tear CPR:  (4/5 LR+= 4.28, 5/5 LR+ 11.2)  History of catching/locking reported   N  Joint line tenderness                           Y  Pain with bounce knee hyperextension          N  Pain with maximal knee flexion                      Y            Pain/audible click with Asael test            N  --> 2/5    Treatment       Trinity received the treatments listed below:      THERAPEUTIC EXERCISES to develop strength, endurance, ROM, flexibility, posture, and core stabilization for 15 minutes including:   Heel prop; 5# above and below; 6 min   Hinge stretch; 20x 5"   Upright bike;8 min Lv 8.0 for aerobic activity; RPM > 70    NP Today:   Prone quad stretch; 3 x 20"  Hinge stretch; 4x 15"   Hammer strength knee ext; 7.5# (closer pin); 3 x 10  DL HS curl 25#; 3 x 15      MANUAL THERAPY TECHNIQUES including Joint mobilizations and Soft tissue Mobilization were applied to left knee for 15 minutes.  Fat pad mob at 0 and 2o deg   Patellar mob Gr 3-4 in all directions  Seat EOT passive flexion with inf patellar glide 2 min    NEUROMUSCULAR RE-EDUCATION ACTIVITIES to improve Balance, Coordination, Kinesthetic, Sense, Proprioception, and Posture for  30 minutes.  The following were included:   Quad isometric at 60 deg flexion 10" hold x 3' w/ mTrigger biofeedback  Quad set into hyper; 10" hold x 2' w/ mTrigger biofeedback  SAQ over 1/2 foam; NMES x 4'  Quad set in heel prop; NMES x 4'  Prone quad set 10 x 10"     NP Today:  SAQ 10 x 10" x 2   SL hip abd; 2 x 15   SL bridge 3x10  Side steps w/ GTB 3 laps  Balance board DL squat; 3 min lat; 3 min fwd/bkwd  SLR 3 x 10  TKE; NMES x 3'    THERAPEUTIC ACTIVITIES to improve dynamic and functional performance for 00 minutes including.    NP Today:   Spilt squat; 3 x 10 B  Box squat; 24"; 2 x 10 w/ 3" hold  HS curl; 15#; 3 x 15  SL Leg Press; 60#; 3 x 15  Monster walks; YTB; 15 ft " "x 2 laps- NP  6" step up 4x8   TRX forward lunge 3x10  Lateral lunge w/ 20# 3x10      Patient Education and Home Exercises     Home Exercises Provided and Patient Education Provided     Education provided:   - prognosis  - diagnosis  - continued HEP  - s/p precautions: DVT, infection, bandaging    Written Home Exercises Provided: yes. Exercises were reviewed and Trinity was able to demonstrate them prior to the end of the session.  Trinity demonstrated good  understanding of the education provided. See EMR under Patient Instructions for exercises provided during therapy sessions    ASSESSMENT   Trinity presented with flexed knee gait and loss of knee extension passively. She was unable to active her quad prior to NMES and was not able to regain active hyperextension by end of session. She continues to present with increased swelling as well as posterior knee pain in max flexion. She tested negative for ligamentous instability or meniscal pathology. She also demonstrates a substantial loss of quad strength in isometric testing, decreasing from 17 kg to 7 kg. She was educated on appropriate edema management and continuation of extension restoration and quad function. Instructed pt to reach out to MD office for evaluation.     Trinity Is progressing well towards her goals.   Pt prognosis is Good.     Pt will continue to benefit from skilled outpatient physical therapy to address the deficits listed in the problem list box on initial evaluation, provide pt/family education and to maximize pt's level of independence in the home and community environment.     Pt's spiritual, cultural and educational needs considered and pt agreeable to plan of care and goals.     Anticipated barriers to physical therapy: none    Goals:  Short Term Goals: 8-10 weeks   1. Pt will be compliant with HEP 50% of prescribed amount- MET   2. The pt to demo improvement in L knee ROM to equal R knee ROM pain free -----progressing not met  3.  The " pt to demo good quad set with proper hyperextension moment of the L knee -----progressing not met   4. The pt to demo ambualting with elast restricitve AD witout major compensatory pattern L knee for at least 20 feet  - MET     Long Term Goals: 6 months    Pt will be compliant with % of prescribed amount. - MET  Patient will improve their FOTO limitation score to at least 17% as evidence of clinically significant improvements in their function.(Progressing, not met)   The pt to demo pain free and uncompensated walking mechanics x10 min on an indoor treadmill- MET  The pt to demo tolerance to a squat at or bellow parallel with uncompensated mechanics x10 (Progressing, not met)   The pt to demo strength of L LE within 10% of R LE as demo'd on the biodex machine (Progressing, not met)   The pt will report full participation in ADLs and IADLs without restrictions related to L knee.(Progressing, not met)     PLAN     Plan of care Certification: 6/13/2023 to 12/31/2023.     Outpatient Physical Therapy 2 times weekly for 12 weeks to include the following interventions: Electrical Stimulation NMES, Gait Training, Manual Therapy, Neuromuscular Re-ed, Patient Education, Self Care, Therapeutic Activities, and Therapeutic Exercise.     Jessica Kevin, PT, DPT

## 2023-12-06 ENCOUNTER — CLINICAL SUPPORT (OUTPATIENT)
Dept: REHABILITATION | Facility: HOSPITAL | Age: 28
End: 2023-12-06
Payer: MEDICAID

## 2023-12-06 DIAGNOSIS — M25.662 DECREASED RANGE OF MOTION OF LEFT KNEE: ICD-10-CM

## 2023-12-06 DIAGNOSIS — R26.9 GAIT ABNORMALITY: Primary | ICD-10-CM

## 2023-12-06 DIAGNOSIS — R29.898 DECREASED STRENGTH OF LOWER EXTREMITY: ICD-10-CM

## 2023-12-06 PROCEDURE — 97110 THERAPEUTIC EXERCISES: CPT

## 2023-12-06 PROCEDURE — 97140 MANUAL THERAPY 1/> REGIONS: CPT

## 2023-12-06 PROCEDURE — 97112 NEUROMUSCULAR REEDUCATION: CPT

## 2023-12-06 NOTE — PROGRESS NOTES
[facility]   Physical Therapy Treatment Note     Name: Trinity Peres  [facility] Number: [id number]    Therapy Diagnosis:   Encounter Diagnoses   Name Primary?    Gait abnormality Yes    Decreased range of motion of left knee     Decreased strength of lower extremity        Physician: Fabiola Pool, *    Visit Date: 12/6/2023    Physician: Fabiola Pool, *     Physician Orders: PT Eval and Treat  Medical Diagnosis from Referral:   Diagnosis   S83.512D (ICD-10-CM) - Rupture of anterior cruciate ligament of left knee, subsequent encounter   S83.242D (ICD-10-CM) - Acute medial meniscus tear, left, subsequent encounter      Evaluation Date: 6/13/2023  Authorization Period Expiration: 09/12/2023  Plan of Care Expiration: 12/31/2023  Progress Note Due: 7/11/2023   Visit # / Visits authorized: pending  FOTO #1 given at eval     Time In: 1000 am  Time Out: 1100 am  Total Billable Time: 60 minutes - medicaid     DOS: 6/12/23  S/p: L ACLr  PROCEDURE:    Arthroscopic assisted ACL reconstruction using bone patellar tendon bone autograft  Bone grafting left tibia and patella  Post-Op Precautions: TTWB for another week, then may increase to 50/50 at week 4, then 100 from week 5-6), ROM 0-90 until 4 weeks post op, then advance to 120     Precautions: Standard, Weightbearing, and post op ACLr      SUBJECTIVE     Pt reports: the swelling and pain is improved but she can't straighten her knee fully  Response to previous treatment: improved quad activation  Functional change: no functional change    Pain: 3/10  Location: left knee      OBJECTIVE     22 weeks and 2 days s/p as of 11/15    Knee Passive Range of Motion:    Right  Left    Flexion 141 140   Extension Hyper 10 Hyper 5     Quad Set: trace quad set, not active hyperextension        Joint Mobility: hypomobile patella in all directions and hypomobile infrapatellar fat pad     11/22/23  Knee Extension Iso Right Left Affected Limb %  "symmetry % deficit   Crane scale 60 deg flex 36.2 kg 7.6 kg*  R 21% 79%       Special Tests:    Right Left   Valgus Stress Test - -    Varus Stress test - -   Lachman's test - -   Posterior drawer - -   Anterior drawer - -   Asael's Test - -      Meniscal tear CPR:  (4/5 LR+= 4.28, 5/5 LR+ 11.2)  History of catching/locking reported   N  Joint line tenderness                           Y  Pain with bounce knee hyperextension          N  Pain with maximal knee flexion                      Y            Pain/audible click with Asael test            N  --> 2/5    Treatment       Trinity received the treatments listed below:      THERAPEUTIC EXERCISES to develop strength, endurance, ROM, flexibility, posture, and core stabilization for 20 minutes including:   Heel prop; 7# above and below;  8 min   Hinge stretch; 20x 5"   Upright bike; 8 min Intervals for aerobic activity; RPM > 50    NP Today:   Prone quad stretch; 3 x 20"  Hinge stretch; 4x 15"   Hammer strength knee ext; 7.5# (closer pin); 3 x 10  DL HS curl 25#; 3 x 15      MANUAL THERAPY TECHNIQUES including Joint mobilizations and Soft tissue Mobilization were applied to left knee for 15 minutes.  Fat pad mob at 0 and 2o deg   Patellar mob Gr 3-4 in all directions  Lateral shift mob w/ quad set 5" x 5    NEUROMUSCULAR RE-EDUCATION ACTIVITIES to improve Balance, Coordination, Kinesthetic, Sense, Proprioception, and Posture for  25 minutes.  The following were included:   SAQ over 1/2 foam; NMES x 4'  Quad set in heel prop; NMES x 4'  EOB SLR NMES x 4'  TKE weight shifts; NMES x 3'  Retrowalking; x 6 min     NP Today:  SAQ 10 x 10" x 2   SL hip abd; 2 x 15   SL bridge 3x10  Side steps w/ GTB 3 laps  Balance board DL squat; 3 min lat; 3 min fwd/bkwd  Prone quad set 10 x 10"     THERAPEUTIC ACTIVITIES to improve dynamic and functional performance for 00 minutes including.    NP Today:   Spilt squat; 3 x 10 B  Box squat; 24"; 2 x 10 w/ 3" hold  HS curl; 15#; 3 x " "15  SL Leg Press; 60#; 3 x 15  Monster walks; YTB; 15 ft x 2 laps- NP  6" step up 4x8   TRX forward lunge 3x10  Lateral lunge w/ 20# 3x10      Patient Education and Home Exercises     Home Exercises Provided and Patient Education Provided     Education provided:   - prognosis  - diagnosis  - continued HEP  - s/p precautions: DVT, infection, bandaging    Written Home Exercises Provided: yes. Exercises were reviewed and Trinity was able to demonstrate them prior to the end of the session.  Trinity demonstrated good  understanding of the education provided. See EMR under Patient Instructions for exercises provided during therapy sessions    ASSESSMENT   Trinity presented with less irritable knee today but continued loss of terminal knee extension and quad activation in this range. She is unable to actively hyperextend and unable to complete SLR without lag. She responded well to NMES but concerned for secondary injury given regression.     Trinity Is progressing well towards her goals.   Pt prognosis is Good.     Pt will continue to benefit from skilled outpatient physical therapy to address the deficits listed in the problem list box on initial evaluation, provide pt/family education and to maximize pt's level of independence in the home and community environment.     Pt's spiritual, cultural and educational needs considered and pt agreeable to plan of care and goals.     Anticipated barriers to physical therapy: none    Goals:  Short Term Goals: 8-10 weeks   1. Pt will be compliant with HEP 50% of prescribed amount- MET   2. The pt to demo improvement in L knee ROM to equal R knee ROM pain free -----progressing not met  3.  The pt to demo good quad set with proper hyperextension moment of the L knee -----progressing not met   4. The pt to demo ambualting with elast restricitve AD witout major compensatory pattern L knee for at least 20 feet  - MET     Long Term Goals: 6 months    Pt will be compliant with % of " prescribed amount. - MET  Patient will improve their FOTO limitation score to at least 17% as evidence of clinically significant improvements in their function.(Progressing, not met)   The pt to demo pain free and uncompensated walking mechanics x10 min on an indoor treadmill- MET  The pt to demo tolerance to a squat at or bellow parallel with uncompensated mechanics x10 (Progressing, not met)   The pt to demo strength of L LE within 10% of R LE as demo'd on the biodex machine (Progressing, not met)   The pt will report full participation in ADLs and IADLs without restrictions related to L knee.(Progressing, not met)     PLAN     Plan of care Certification: 6/13/2023 to 12/31/2023.     Outpatient Physical Therapy 2 times weekly for 12 weeks to include the following interventions: Electrical Stimulation NMES, Gait Training, Manual Therapy, Neuromuscular Re-ed, Patient Education, Self Care, Therapeutic Activities, and Therapeutic Exercise.     Jessica Kevin, PT, DPT

## 2023-12-13 ENCOUNTER — CLINICAL SUPPORT (OUTPATIENT)
Dept: REHABILITATION | Facility: HOSPITAL | Age: 28
End: 2023-12-13
Payer: MEDICAID

## 2023-12-13 DIAGNOSIS — R29.898 DECREASED STRENGTH OF LOWER EXTREMITY: ICD-10-CM

## 2023-12-13 DIAGNOSIS — M25.662 DECREASED RANGE OF MOTION OF LEFT KNEE: ICD-10-CM

## 2023-12-13 DIAGNOSIS — R26.9 GAIT ABNORMALITY: Primary | ICD-10-CM

## 2023-12-13 PROCEDURE — 97110 THERAPEUTIC EXERCISES: CPT

## 2023-12-13 NOTE — PROGRESS NOTES
[facility]   Physical Therapy Treatment Note     Name: Trinity Peres  [facility] Number: [id number]    Therapy Diagnosis:   Encounter Diagnoses   Name Primary?    Gait abnormality Yes    Decreased range of motion of left knee     Decreased strength of lower extremity        Physician: Fabiola Pool, *    Visit Date: 12/13/2023    Physician: Fabiola Pool, *     Physician Orders: PT Eval and Treat  Medical Diagnosis from Referral:   Diagnosis   S83.512D (ICD-10-CM) - Rupture of anterior cruciate ligament of left knee, subsequent encounter   S83.242D (ICD-10-CM) - Acute medial meniscus tear, left, subsequent encounter      Evaluation Date: 6/13/2023  Authorization Period Expiration: 09/12/2023  Plan of Care Expiration: 12/31/2023  Progress Note Due: 7/11/2023   Visit # / Visits authorized: pending  FOTO #1 given at eval     Time In: 1000 am  Time Out: 1100 am  Total Billable Time: 60 minutes - medicaid     DOS: 6/12/23  S/p: L ACLr  PROCEDURE:    Arthroscopic assisted ACL reconstruction using bone patellar tendon bone autograft  Bone grafting left tibia and patella  Post-Op Precautions: TTWB for another week, then may increase to 50/50 at week 4, then 100 from week 5-6), ROM 0-90 until 4 weeks post op, then advance to 120     Precautions: Standard, Weightbearing, and post op ACLr      SUBJECTIVE     Pt reports:her knee is still stiff and she's having a hard time getting her knee straight or firing her quad despite the 2 months since flare up  Response to previous treatment: improved quad activation  Functional change: no functional change    Pain: 3/10  Location: left knee      OBJECTIVE     6 months and 1 days s/p as of 12/13    Knee Passive Range of Motion:    Right  Left    Flexion 141 140   Extension Hyper 10 Hyper 5     Quad Set: trace quad set, not active hyperextension        Joint Mobility: hypomobile patella in all directions and hypomobile infrapatellar fat pad    "  11/22/23  Knee Extension Iso Right Left Affected Limb % symmetry % deficit   Crane scale 60 deg flex 36.2 kg 7.6 kg*  R 21% 79%       Special Tests:    Right Left   Valgus Stress Test - -    Varus Stress test - -   Lachman's test - -   Posterior drawer - -   Anterior drawer - -   Asael's Test - -      Meniscal tear CPR:  (4/5 LR+= 4.28, 5/5 LR+ 11.2)  History of catching/locking reported   N  Joint line tenderness                           Y  Pain with bounce knee hyperextension          N  Pain with maximal knee flexion                      Y            Pain/audible click with Asael test            N  --> 2/5    Treatment       Trinity received the treatments listed below:      THERAPEUTIC EXERCISES to develop strength, endurance, ROM, flexibility, posture, and core stabilization for 20 minutes including:   Heel prop; 7# above and below;  8 min   Hinge stretch; 20x 5"   Fan bike; 5 min Intervals for aerobic activity; RPM > 60    NP Today:   Prone quad stretch; 3 x 20"  Hinge stretch; 4x 15"   Hammer strength knee ext; 7.5# (closer pin); 3 x 10  DL HS curl 25#; 3 x 15      MANUAL THERAPY TECHNIQUES including Joint mobilizations and Soft tissue Mobilization were applied to left knee for 10 minutes.  Fat pad mob at 0 and 20 deg   Patellar mob Gr 3-4 in all directions    NEUROMUSCULAR RE-EDUCATION ACTIVITIES to improve Balance, Coordination, Kinesthetic, Sense, Proprioception, and Posture for  30 minutes.  The following were included:   Quad set w/ strap; 20x 5"  BRF 80% occlusion; 30/15/15/15   - SLR at EOB   - LAQ; RTB   - SL Leg press; 40#    NP Today:  SAQ 10 x 10" x 2   SL hip abd; 2 x 15   SL bridge 3x10  Side steps w/ GTB 3 laps  Balance board DL squat; 3 min lat; 3 min fwd/bkwd  Prone quad set 10 x 10"   SAQ over 1/2 foam; NMES x 4'  Quad set in heel prop; NMES x 4'  EOB SLR NMES x 4'  TKE weight shifts; NMES x 3'  Retrowalking; x 6 min     THERAPEUTIC ACTIVITIES to improve dynamic and functional " "performance for 00 minutes including.    NP Today:   Spilt squat; 3 x 10 B  Box squat; 24"; 2 x 10 w/ 3" hold  HS curl; 15#; 3 x 15  SL Leg Press; 60#; 3 x 15  Monster walks; YTB; 15 ft x 2 laps- NP  6" step up 4x8   TRX forward lunge 3x10  Lateral lunge w/ 20# 3x10      Patient Education and Home Exercises     Home Exercises Provided and Patient Education Provided     Education provided:   - prognosis  - diagnosis  - continued HEP  - s/p precautions: DVT, infection, bandaging    Written Home Exercises Provided: yes. Exercises were reviewed and Trinity was able to demonstrate them prior to the end of the session.  Trinity demonstrated good  understanding of the education provided. See EMR under Patient Instructions for exercises provided during therapy sessions    ASSESSMENT   Trinity continues with significant deficits in functional and quad control which have been persistent over last 8 weeks despite initially good progress in functional movements. She responded well to blood flow restriction as well as fan bike to increased quad work. She demonstrated improved gait upon departure from clinic compared to arrival. She follows up with MD next week and will benefit significant progression in strength and function in order to improve community ambulation and safety.     Trinity Is progressing well towards her goals.   Pt prognosis is Good.     Pt will continue to benefit from skilled outpatient physical therapy to address the deficits listed in the problem list box on initial evaluation, provide pt/family education and to maximize pt's level of independence in the home and community environment.     Pt's spiritual, cultural and educational needs considered and pt agreeable to plan of care and goals.     Anticipated barriers to physical therapy: none    Goals:  Short Term Goals: 8-10 weeks   1. Pt will be compliant with HEP 50% of prescribed amount- MET   2. The pt to demo improvement in L knee ROM to equal R knee ROM " pain free -----progressing not met  3.  The pt to demo good quad set with proper hyperextension moment of the L knee -----progressing not met   4. The pt to demo ambualting with elast restricitve AD witout major compensatory pattern L knee for at least 20 feet  - MET     Long Term Goals: 6 months    Pt will be compliant with % of prescribed amount. - MET  Patient will improve their FOTO limitation score to at least 17% as evidence of clinically significant improvements in their function.(Progressing, not met)   The pt to demo pain free and uncompensated walking mechanics x10 min on an indoor treadmill- MET  The pt to demo tolerance to a squat at or bellow parallel with uncompensated mechanics x10 (Progressing, not met)   The pt to demo strength of L LE within 10% of R LE as demo'd on the biodex machine (Progressing, not met)   The pt will report full participation in ADLs and IADLs without restrictions related to L knee.(Progressing, not met)     PLAN     Plan of care Certification: 6/13/2023 to 12/31/2023.     Outpatient Physical Therapy 2 times weekly for 12 weeks to include the following interventions: Electrical Stimulation NMES, Gait Training, Manual Therapy, Neuromuscular Re-ed, Patient Education, Self Care, Therapeutic Activities, and Therapeutic Exercise.     Jessica Kevin, PT, DPT

## 2023-12-18 ENCOUNTER — OFFICE VISIT (OUTPATIENT)
Dept: ORTHOPEDICS | Facility: CLINIC | Age: 28
End: 2023-12-18
Payer: MEDICAID

## 2023-12-18 VITALS
SYSTOLIC BLOOD PRESSURE: 124 MMHG | DIASTOLIC BLOOD PRESSURE: 84 MMHG | BODY MASS INDEX: 35.78 KG/M2 | WEIGHT: 214.75 LBS | HEIGHT: 65 IN | HEART RATE: 88 BPM

## 2023-12-18 DIAGNOSIS — Z98.890 S/P ACL RECONSTRUCTION: Primary | ICD-10-CM

## 2023-12-18 PROCEDURE — 20610 DRAIN/INJ JOINT/BURSA W/O US: CPT | Mod: S$PBB,LT,,

## 2023-12-18 PROCEDURE — 1160F RVW MEDS BY RX/DR IN RCRD: CPT | Mod: CPTII,,,

## 2023-12-18 PROCEDURE — 99999 PR PBB SHADOW E&M-EST. PATIENT-LVL III: CPT | Mod: PBBFAC,,,

## 2023-12-18 PROCEDURE — 1160F PR REVIEW ALL MEDS BY PRESCRIBER/CLIN PHARMACIST DOCUMENTED: ICD-10-PCS | Mod: CPTII,,,

## 2023-12-18 PROCEDURE — 3079F PR MOST RECENT DIASTOLIC BLOOD PRESSURE 80-89 MM HG: ICD-10-PCS | Mod: CPTII,,,

## 2023-12-18 PROCEDURE — 3008F PR BODY MASS INDEX (BMI) DOCUMENTED: ICD-10-PCS | Mod: CPTII,,,

## 2023-12-18 PROCEDURE — 99999 PR PBB SHADOW E&M-EST. PATIENT-LVL III: ICD-10-PCS | Mod: PBBFAC,,,

## 2023-12-18 PROCEDURE — 3074F PR MOST RECENT SYSTOLIC BLOOD PRESSURE < 130 MM HG: ICD-10-PCS | Mod: CPTII,,,

## 2023-12-18 PROCEDURE — 20610 DRAIN/INJ JOINT/BURSA W/O US: CPT | Mod: PBBFAC,PN,LT

## 2023-12-18 PROCEDURE — 1159F PR MEDICATION LIST DOCUMENTED IN MEDICAL RECORD: ICD-10-PCS | Mod: CPTII,,,

## 2023-12-18 PROCEDURE — 99213 OFFICE O/P EST LOW 20 MIN: CPT | Mod: S$PBB,25,,

## 2023-12-18 PROCEDURE — 3074F SYST BP LT 130 MM HG: CPT | Mod: CPTII,,,

## 2023-12-18 PROCEDURE — 1159F MED LIST DOCD IN RCRD: CPT | Mod: CPTII,,,

## 2023-12-18 PROCEDURE — 99213 OFFICE O/P EST LOW 20 MIN: CPT | Mod: PBBFAC,PN,25

## 2023-12-18 PROCEDURE — 99213 PR OFFICE/OUTPT VISIT, EST, LEVL III, 20-29 MIN: ICD-10-PCS | Mod: S$PBB,25,,

## 2023-12-18 PROCEDURE — 3079F DIAST BP 80-89 MM HG: CPT | Mod: CPTII,,,

## 2023-12-18 PROCEDURE — 3008F BODY MASS INDEX DOCD: CPT | Mod: CPTII,,,

## 2023-12-18 PROCEDURE — 20610 LARGE JOINT ASPIRATION/INJECTION: L KNEE: ICD-10-PCS | Mod: S$PBB,LT,,

## 2023-12-18 NOTE — PROGRESS NOTES
Post-op Note    HPI    Trinity Peres is here 6 months s/p the following procedure:     6/12/2023     Arthroscopic assisted ACL reconstruction using bone patellar tendon bone autograft  Bone grafting left tibia and patella    Overall was doing well, however over the last 4-6 weeks she has noticed more swelling and stiffness in her knee.  She was doing well in therapy but also has noticed decreased quad activation and strength.  Feels her gait is impacted.  Denies new injury.      Physical Exam:     Patient is alert and oriented no acute distress.   Assistive Device:  None    Left knee: sutures removed Incision(s) are well healed.  There is no evidence of dehiscence.  There is no induration erythema or signs of infection.  No soft tissue swelling. Large effusion.  Compartments are soft and compressible.  Warm well-perfused extremity. ROM 5-130.  Stable Lachman's exam.  Stable to varus and valgus stress. Left quad 4/5 strength.     Imaging:  Left knee xrays reveal s/p ACL reconstruction with no evidence of hardware complication    Assessment    Trinity Peres is 12 weeks Post-op     Plan:    Overall doing as expected.  We discussed expectations of surgery and postoperative course.     Pain: Continued postoperative pain regimen -- ibuprofen   Continue physical therapy.    Attempted aspiration today which revealed 3 mL of bloody joint fluid.  Possible hematoma versus bloody effusion.  We will proceed with an MRI of the left knee to evaluate for cyclops lesion. Xray ordered to evaluate button. F/U pending MRI results.     Follow-up:  MRI results

## 2023-12-18 NOTE — PROCEDURES
Large Joint Aspiration/Injection: L knee    Date/Time: 12/18/2023 1:30 PM    Performed by: Fabiola Pool PA-C  Authorized by: Fabiola Pool PA-C    Consent Done?:  Yes (Verbal)  Indications:  Pain and diagnostic evaluation  Site marked: the procedure site was marked    Timeout: prior to procedure the correct patient, procedure, and site was verified    Prep: patient was prepped and draped in usual sterile fashion    Local anesthetic:  Bupivacaine 0.25% without epinephrine  Anesthetic total (ml):  5      Details:  Needle Size:  22 G and 18 G  Ultrasonic Guidance for needle placement?: No    Approach: superolateral.  Location:  Knee  Site:  L knee  Aspirate amount (mL):  3  Aspirate:  Bloody  Patient tolerance:  Patient tolerated the procedure well with no immediate complications

## 2023-12-23 ENCOUNTER — PATIENT MESSAGE (OUTPATIENT)
Dept: ORTHOPEDICS | Facility: CLINIC | Age: 28
End: 2023-12-23
Payer: MEDICAID

## 2024-01-10 ENCOUNTER — OFFICE VISIT (OUTPATIENT)
Dept: ORTHOPEDICS | Facility: CLINIC | Age: 29
End: 2024-01-10
Payer: MEDICAID

## 2024-01-10 VITALS
BODY MASS INDEX: 35.65 KG/M2 | HEIGHT: 65 IN | WEIGHT: 214 LBS | DIASTOLIC BLOOD PRESSURE: 87 MMHG | HEART RATE: 75 BPM | SYSTOLIC BLOOD PRESSURE: 125 MMHG

## 2024-01-10 DIAGNOSIS — Z98.890 S/P ACL RECONSTRUCTION: Primary | ICD-10-CM

## 2024-01-10 PROCEDURE — 3079F DIAST BP 80-89 MM HG: CPT | Mod: CPTII,,,

## 2024-01-10 PROCEDURE — 99213 OFFICE O/P EST LOW 20 MIN: CPT | Mod: S$PBB,,,

## 2024-01-10 PROCEDURE — 3074F SYST BP LT 130 MM HG: CPT | Mod: CPTII,,,

## 2024-01-10 PROCEDURE — 3008F BODY MASS INDEX DOCD: CPT | Mod: CPTII,,,

## 2024-01-10 PROCEDURE — 1159F MED LIST DOCD IN RCRD: CPT | Mod: CPTII,,,

## 2024-01-10 PROCEDURE — 99999 PR PBB SHADOW E&M-EST. PATIENT-LVL III: CPT | Mod: PBBFAC,,,

## 2024-01-10 PROCEDURE — 99213 OFFICE O/P EST LOW 20 MIN: CPT | Mod: PBBFAC,PN

## 2024-01-10 RX ORDER — METHYLPREDNISOLONE 4 MG/1
TABLET ORAL
Qty: 21 EACH | Refills: 0 | Status: SHIPPED | OUTPATIENT
Start: 2024-01-10 | End: 2024-01-31

## 2024-01-10 RX ORDER — NAPROXEN 500 MG/1
500 TABLET ORAL 2 TIMES DAILY
Qty: 30 TABLET | Refills: 1 | Status: SHIPPED | OUTPATIENT
Start: 2024-01-10

## 2024-01-10 NOTE — PROGRESS NOTES
Post-op Note    HPI    Trinity Peres is here 7 months s/p the following procedure:     6/12/2023     Arthroscopic assisted ACL reconstruction using bone patellar tendon bone autograft  Bone grafting left tibia and patella    Overall was doing well, however noticed swelling and effusion to her knee. MRI was ordered at previous visit and patient returns today to discuss results. Reports symptoms similar to previous. Denies injury or systemic symptoms. Attempted aspiration at previous visit with no success.       Physical Exam:     Patient is alert and oriented no acute distress.   Assistive Device:  None    Left knee: ncision(s) are well healed.  There is no evidence of dehiscence.  There is no induration erythema or signs of infection.  No soft tissue swelling. Large effusion.  Compartments are soft and compressible.  Warm well-perfused extremity. ROM 5-130.  Stable Lachman's exam.  Stable to varus and valgus stress. Left quad 4/5 strength.     Imaging:  Left knee xrays reveal s/p ACL reconstruction with no evidence of hardware complication    MRI left knee shows intact ACL with no evidence of complication, large effusion with synovitis.     Assessment    Trinity Peres is 7 months Post-op     Plan:    Patient returns to clinic today for follow up of left knee MRI results. Dr. Rodriguez reviewed. MRI shows intact ACL.  However she continues to have insidious onset of joint effusion.  We will trial short course of anti-inflammatories and Medrol Dosepak.  Medrol Dosepak sent, take as instructed.  Naproxen b.i.d. times 14 days.  Avoid other NSAIDs, take with food.  I would like her to see possible primary care sports medicine for aspiration under ultrasound as well in attempts to resolve effusion.  We will send their staff a message to see if we can get her scheduled.

## 2024-01-11 ENCOUNTER — TELEPHONE (OUTPATIENT)
Dept: SPORTS MEDICINE | Facility: CLINIC | Age: 29
End: 2024-01-11
Payer: MEDICAID

## 2024-01-11 NOTE — TELEPHONE ENCOUNTER
Called and spoke to patient.  She is scheduled with Dr. Eden at the [facility] for Tuesday 1/16/24

## 2024-01-11 NOTE — TELEPHONE ENCOUNTER
----- Message from Anjali Eden MD sent at 1/11/2024  7:39 AM CST -----  Regarding: RE: ACL Reconstruction Effusion  Good morning,  I sure can.      I have cc'd my staff to facilitate scheduling.   ----- Message -----  From: Fabiola Pool PA-C  Sent: 1/10/2024   1:25 PM CST  To: Anjali Eden MD  Subject: ACL Reconstruction Effusion                      Hey Dr. Eden!    Ms. Peres is one of our patients we did a left ACL reconstruction on 7 months ago. She was doing great, then had acute onset of large effusion. I tried to aspirate in clinic and only got like 3 mLs. I repeated the MRI and the graft is stable, but she has a large effusion with synovitis. We are trying a course of nsaids and medrol dose pack, but was wondering if you could see her for aspiration under ultrasound? I think that would improve some of her symptoms!    Let me know, thanks!  Fabiola

## 2024-01-16 ENCOUNTER — OFFICE VISIT (OUTPATIENT)
Dept: SPORTS MEDICINE | Facility: CLINIC | Age: 29
End: 2024-01-16
Payer: MEDICAID

## 2024-01-16 VITALS — SYSTOLIC BLOOD PRESSURE: 133 MMHG | DIASTOLIC BLOOD PRESSURE: 92 MMHG

## 2024-01-16 DIAGNOSIS — Z98.890 S/P LEFT KNEE ARTHROSCOPY: ICD-10-CM

## 2024-01-16 DIAGNOSIS — M25.462 EFFUSION OF BURSA OF LEFT KNEE: Primary | ICD-10-CM

## 2024-01-16 PROCEDURE — 99213 OFFICE O/P EST LOW 20 MIN: CPT | Mod: PBBFAC,PN,25 | Performed by: STUDENT IN AN ORGANIZED HEALTH CARE EDUCATION/TRAINING PROGRAM

## 2024-01-16 PROCEDURE — 99999 PR PBB SHADOW E&M-EST. PATIENT-LVL III: CPT | Mod: PBBFAC,,, | Performed by: STUDENT IN AN ORGANIZED HEALTH CARE EDUCATION/TRAINING PROGRAM

## 2024-01-16 PROCEDURE — 20611 DRAIN/INJ JOINT/BURSA W/US: CPT | Mod: PBBFAC,PN | Performed by: STUDENT IN AN ORGANIZED HEALTH CARE EDUCATION/TRAINING PROGRAM

## 2024-01-16 PROCEDURE — 99499 UNLISTED E&M SERVICE: CPT | Mod: S$PBB,,, | Performed by: STUDENT IN AN ORGANIZED HEALTH CARE EDUCATION/TRAINING PROGRAM

## 2024-01-16 NOTE — PROGRESS NOTES
CC: left knee pain    28 y.o. Female presents today for aspiration of her left knee. Pt was referred by Renee Pool PA-C.    Attempted treatments: medrol dose mia, naproxen  Pain score: 0/10  History of trauma/injury: none since last visit with Renee Pool PA-C  Affecting ADLs: no      REVIEW OF SYSTEMS:   Constitution: Patient denies fever or chills.  Eyes: Patient denies eye pain or vision changes.  HEENT: Patient denies ear pain, sore throat, or nasal discharge.  CVS: Patient denies chest pain.  Lungs: Patient denies shortness of breath or cough.  Skin: Patient denies skin rash or itching.    Musculoskeletal: Patient denies recent falls. See HPI.  Psych: Patient reports nervousness.    PAST MEDICAL HISTORY:   No past medical history on file.    MEDICATIONS:     Current Outpatient Medications:     naproxen (NAPROSYN) 500 MG tablet, Take 1 tablet (500 mg total) by mouth 2 (two) times daily., Disp: 30 tablet, Rfl: 1    neomycin-polymyxin-dexamethasone (MAXITROL) 3.5mg/mL-10,000 unit/mL-0.1 % DrpS, Place 1 drop into the left eye 4 (four) times daily., Disp: , Rfl:     olopatadine (PATANOL) 0.1 % ophthalmic solution, Place 1 drop into both eyes 2 (two) times daily., Disp: , Rfl:     sertraline (ZOLOFT) 50 MG tablet, Take 1 tablet (50 mg total) by mouth once daily., Disp: 90 tablet, Rfl: 3    methylPREDNISolone (MEDROL DOSEPACK) 4 mg tablet, use as directed (Patient not taking: Reported on 1/16/2024), Disp: 21 each, Rfl: 0    ondansetron (ZOFRAN-ODT) 4 MG TbDL, Take 1 tablet (4 mg total) by mouth 2 (two) times daily. (Patient not taking: Reported on 1/16/2024), Disp: 20 tablet, Rfl: 0    oxyCODONE-acetaminophen (PERCOCET) 5-325 mg per tablet, Take 1 tablet by mouth every 4 (four) hours as needed for Pain. (Patient not taking: Reported on 1/16/2024), Disp: 28 each, Rfl: 0    ALLERGIES:   Review of patient's allergies indicates:  No Known Allergies     PHYSICAL EXAMINATION:  BP (!) 133/92   LMP 11/27/2023  (Approximate)   There are no signs of infection at the injection site, including no rubor, calor, or skin lesions.  Gen: NAD.  Psych: Affect & judgment nl.  Neuro: Grossly CNI. HOOPER.  HEENT: -Trach dev. -Eye d/c.   CV: Color nl. -E/C/C. WWPx4.  Pulm: -Dyspnea. -Cough.  Lymph: -Edema.  Int: -Rash/lesion noted. Skin is warm and dry    Diagnostic Extremity - [facility]-Sports Ultrasound was recommended due to left knee(s) evaluation.    FOCUSED: examination.     TECHNIQUE: Real time ultrasound examination of the left knee was performed with SonoSite Edge 2, 9-L MHz linear probe.     FINDINGS: The images are of adequate diagnostic quality with identification of all echogenic structures made except for the vascular structures unless otherwise noted. There is no sonographic evidence of periosteal abnormalities, soft tissue edema, musculotendinous or nerve irregularities. Small joint effusion. The rest of the sonographic examination was unremarkable.    Ultrasound images were directly reviewed with the patient and then a treatment plan was discussed.     Images were stored in the patients medical record.     IMPRESSION: Small joint effusion to be aspirated.       ASSESSMENT:      ICD-10-CM ICD-9-CM   1. Effusion of bursa of left knee  M25.462 719.06   2. S/P left knee arthroscopy  Z98.890 V45.89         PLAN:  Ultrasound-guided aspiration of the left knee performed at visit today.    Risks and benefits were discussed with patient prior to receiving aspiration.  Risks include the possibility of infection, pain, and cosmetic deformity at site of injection.    All questions were answered to the best of my ability and all concerns were addressed at this time.    This note is dictated using the M*Modal Fluency Direct word recognition program. There are word recognition mistakes that are occasionally missed on review.

## 2024-01-16 NOTE — PROCEDURES
Large Joint Aspiration/Injection: L knee    Date/Time: 1/16/2024 2:15 PM    Performed by: Anjali Eden MD  Authorized by: Anjali Eden MD    Consent Done?:  Yes (Verbal)  Indications:  Diagnostic evaluation, joint swelling and pain  Site marked: the procedure site was marked    Timeout: prior to procedure the correct patient, procedure, and site was verified    Prep: patient was prepped and draped in usual sterile fashion      Local anesthesia used?: Yes    Anesthesia:  Local infiltration  Local anesthetic:  Co-phenylcaine spray and lidocaine 1% without epinephrine  Anesthetic total (ml):  2      Details:  Needle Size:  18 G and 25 G  Ultrasonic Guidance for needle placement?: Yes (Ultrasound guidance used to avoid neurovascular injury and/or to improve accuracy given body habitus.)    Images are saved and documented.  Approach: Superolateral.  Location:  Knee  Site:  L knee  Aspirate amount (mL):  1.5  Aspirate:  Clear and yellow  Lab: fluid sent for laboratory analysis    Patient tolerance:  Patient tolerated the procedure well with no immediate complications     TECHNIQUE: Real time ultrasound examination of the left knee was performed with SonArchetypeste Edge 2, 9-L MHz linear probe(s). Ultrasound guidance was used for needle localization. Images were saved and stored for documentation. Dynamic visualization of the needle was continuous throughout the procedures and maintained in good position.

## 2024-01-31 ENCOUNTER — PATIENT MESSAGE (OUTPATIENT)
Dept: OBSTETRICS AND GYNECOLOGY | Facility: CLINIC | Age: 29
End: 2024-01-31
Payer: MEDICAID

## 2024-02-06 ENCOUNTER — TELEPHONE (OUTPATIENT)
Dept: OBSTETRICS AND GYNECOLOGY | Facility: CLINIC | Age: 29
End: 2024-02-06
Payer: MEDICAID

## 2024-02-06 NOTE — TELEPHONE ENCOUNTER
Spoke with pt. Verified pt name and .    Explained to pt that Dr. Roper will be out of the office for 12 weeks due to an emergency and we will need to reschedule her appointment.     Pt verbalize understanding and scheduled with dr roper may 31 at 10;30 am

## 2024-05-31 ENCOUNTER — LAB VISIT (OUTPATIENT)
Dept: LAB | Facility: HOSPITAL | Age: 29
End: 2024-05-31
Attending: OBSTETRICS & GYNECOLOGY
Payer: MEDICAID

## 2024-05-31 ENCOUNTER — OFFICE VISIT (OUTPATIENT)
Dept: OBSTETRICS AND GYNECOLOGY | Facility: CLINIC | Age: 29
End: 2024-05-31
Payer: MEDICAID

## 2024-05-31 VITALS
WEIGHT: 208.31 LBS | BODY MASS INDEX: 34.67 KG/M2 | SYSTOLIC BLOOD PRESSURE: 132 MMHG | DIASTOLIC BLOOD PRESSURE: 81 MMHG

## 2024-05-31 DIAGNOSIS — Z01.419 ROUTINE GYNECOLOGICAL EXAMINATION: Primary | ICD-10-CM

## 2024-05-31 DIAGNOSIS — Z11.3 SCREENING EXAMINATION FOR STD (SEXUALLY TRANSMITTED DISEASE): ICD-10-CM

## 2024-05-31 DIAGNOSIS — Z01.419 ROUTINE GYNECOLOGICAL EXAMINATION: ICD-10-CM

## 2024-05-31 DIAGNOSIS — Z12.4 CERVICAL CANCER SCREENING: ICD-10-CM

## 2024-05-31 LAB — HIV 1+2 AB+HIV1 P24 AG SERPL QL IA: NORMAL

## 2024-05-31 PROCEDURE — 3079F DIAST BP 80-89 MM HG: CPT | Mod: CPTII,,, | Performed by: OBSTETRICS & GYNECOLOGY

## 2024-05-31 PROCEDURE — 99395 PREV VISIT EST AGE 18-39: CPT | Mod: S$PBB,,, | Performed by: OBSTETRICS & GYNECOLOGY

## 2024-05-31 PROCEDURE — 36415 COLL VENOUS BLD VENIPUNCTURE: CPT | Performed by: OBSTETRICS & GYNECOLOGY

## 2024-05-31 PROCEDURE — 99999 PR PBB SHADOW E&M-EST. PATIENT-LVL II: CPT | Mod: PBBFAC,,, | Performed by: OBSTETRICS & GYNECOLOGY

## 2024-05-31 PROCEDURE — 3075F SYST BP GE 130 - 139MM HG: CPT | Mod: CPTII,,, | Performed by: OBSTETRICS & GYNECOLOGY

## 2024-05-31 PROCEDURE — 87591 N.GONORRHOEAE DNA AMP PROB: CPT | Performed by: OBSTETRICS & GYNECOLOGY

## 2024-05-31 PROCEDURE — 87491 CHLMYD TRACH DNA AMP PROBE: CPT | Performed by: OBSTETRICS & GYNECOLOGY

## 2024-05-31 PROCEDURE — 88141 CYTOPATH C/V INTERPRET: CPT | Mod: ,,, | Performed by: PATHOLOGY

## 2024-05-31 PROCEDURE — 1159F MED LIST DOCD IN RCRD: CPT | Mod: CPTII,,, | Performed by: OBSTETRICS & GYNECOLOGY

## 2024-05-31 PROCEDURE — 87624 HPV HI-RISK TYP POOLED RSLT: CPT | Performed by: OBSTETRICS & GYNECOLOGY

## 2024-05-31 PROCEDURE — 3008F BODY MASS INDEX DOCD: CPT | Mod: CPTII,,, | Performed by: OBSTETRICS & GYNECOLOGY

## 2024-05-31 PROCEDURE — 88175 CYTOPATH C/V AUTO FLUID REDO: CPT | Performed by: PATHOLOGY

## 2024-05-31 PROCEDURE — 87389 HIV-1 AG W/HIV-1&-2 AB AG IA: CPT | Performed by: OBSTETRICS & GYNECOLOGY

## 2024-05-31 PROCEDURE — 99212 OFFICE O/P EST SF 10 MIN: CPT | Mod: PBBFAC,PO | Performed by: OBSTETRICS & GYNECOLOGY

## 2024-05-31 NOTE — PROGRESS NOTES
"27 yo female who presents for routine gyn visit.  Cycles come q month.  No complaints.  Ok with std testing.  Declines contraception.    ROS:  GENERAL: Denies weight gain or weight loss. Feeling well overall.   SKIN: positive "rash" on lower abdomen, neck (will see PCP)  HEAD: Denies head injury or headache.   CHEST: Denies chest pain or shortness of breath.   CARDIOVASCULAR: Denies palpitations or left sided chest pain.   ABDOMEN: No abdominal pain, constipation, diarrhea, nausea, vomiting or rectal bleeding.   URINARY: No frequency, dysuria, hematuria, or burning on urination.  REPRODUCTIVE: See HPI.   BREASTS:  denies pain, lumps, or nipple discharge.   HEMATOLOGIC: No easy bruisability or excessive bleeding.   MUSCULOSKELETAL: Denies joint pain or swelling.   NEUROLOGIC: Denies syncope or weakness.   PSYCHIATRIC: Denies depression, anxiety or mood swings.         PE:   Vitals: /81   Wt 94.5 kg (208 lb 5.4 oz)   LMP 05/22/2024 (Exact Date) Comment: lasted until 5/26/24 with normal flow  BMI 34.67 kg/m²   APPEARANCE: Well nourished, well developed, in no acute distress.  SKIN: Normal skin turgor, no lesions.  ABDOMEN: Soft. No tenderness or masses. No hepatosplenomegaly. No hernias.  BREASTS: Symmetrical, no skin changes or visible lesions. No palpable masses, nipple discharge or adenopathy bilaterally.  PELVIC: Normal external female genitalia without lesions. Normal hair distribution. Adequate perineal body, normal urethral meatus. Vagina moist and well rugated without lesions or discharge. Cervix pink and without lesions. No significant cystocele or rectocele. Bimanual exam showed uterus normal size, shape, position, mobile and nontender. Adnexa without masses or tenderness. Urethra and bladder normal.      AP  Routine gyn  -s/p normal breast exam:   -s/p normal pelvic exam:   -Pap collected  -STD testing: gc/chl and HIV ordered  -contraception: declined        CJ Roper MD    "

## 2024-06-02 LAB
C TRACH DNA SPEC QL NAA+PROBE: NOT DETECTED
N GONORRHOEA DNA SPEC QL NAA+PROBE: NOT DETECTED

## 2024-06-10 LAB
FINAL PATHOLOGIC DIAGNOSIS: ABNORMAL
Lab: ABNORMAL

## 2024-06-10 NOTE — PROGRESS NOTES
Pap shows rare atypical cells. Waiting to see if these is caused by a virus called the HPV virus.    Once I have that result, I will be able to  you better on the pap results.    Dr quintero

## 2024-06-11 LAB
HPV HR 12 DNA SPEC QL NAA+PROBE: NEGATIVE
HPV16 AG SPEC QL: NEGATIVE
HPV18 DNA SPEC QL NAA+PROBE: NEGATIVE

## 2024-10-18 ENCOUNTER — OFFICE VISIT (OUTPATIENT)
Dept: PRIMARY CARE CLINIC | Facility: CLINIC | Age: 29
End: 2024-10-18

## 2024-10-18 VITALS
DIASTOLIC BLOOD PRESSURE: 84 MMHG | HEART RATE: 80 BPM | BODY MASS INDEX: 36.24 KG/M2 | SYSTOLIC BLOOD PRESSURE: 120 MMHG | HEIGHT: 65 IN | WEIGHT: 217.5 LBS | OXYGEN SATURATION: 100 %

## 2024-10-18 DIAGNOSIS — L20.9 ATOPIC DERMATITIS, UNSPECIFIED TYPE: ICD-10-CM

## 2024-10-18 DIAGNOSIS — Z00.00 ANNUAL PHYSICAL EXAM: ICD-10-CM

## 2024-10-18 DIAGNOSIS — F32.A ANXIETY AND DEPRESSION: ICD-10-CM

## 2024-10-18 DIAGNOSIS — H92.03 OTALGIA OF BOTH EARS: Primary | ICD-10-CM

## 2024-10-18 DIAGNOSIS — F41.9 ANXIETY AND DEPRESSION: ICD-10-CM

## 2024-10-18 PROCEDURE — 99999 PR PBB SHADOW E&M-EST. PATIENT-LVL III: CPT | Mod: PBBFAC,,, | Performed by: FAMILY MEDICINE

## 2024-10-18 PROCEDURE — 99214 OFFICE O/P EST MOD 30 MIN: CPT | Mod: S$PBB,,, | Performed by: FAMILY MEDICINE

## 2024-10-18 PROCEDURE — 99213 OFFICE O/P EST LOW 20 MIN: CPT | Mod: PBBFAC,PN | Performed by: FAMILY MEDICINE

## 2024-10-18 RX ORDER — SERTRALINE HYDROCHLORIDE 100 MG/1
TABLET, FILM COATED ORAL
Qty: 90 TABLET | Refills: 3 | Status: SHIPPED | OUTPATIENT
Start: 2024-10-18

## 2024-10-18 RX ORDER — AMOXICILLIN AND CLAVULANATE POTASSIUM 875; 125 MG/1; MG/1
1 TABLET, FILM COATED ORAL EVERY 12 HOURS
Qty: 10 TABLET | Refills: 0 | Status: SHIPPED | OUTPATIENT
Start: 2024-10-18 | End: 2024-10-23

## 2024-10-18 RX ORDER — TRIAMCINOLONE ACETONIDE 1 MG/G
CREAM TOPICAL 2 TIMES DAILY PRN
Qty: 80 G | Refills: 1 | Status: SHIPPED | OUTPATIENT
Start: 2024-10-18

## 2025-02-11 NOTE — TELEPHONE ENCOUNTER
No care due was identified.  [facility] Embedded Care Due Messages. Reference number: [id number].   2/11/2025 2:32:31 PM CST

## 2025-02-12 RX ORDER — SERTRALINE HYDROCHLORIDE 100 MG/1
TABLET, FILM COATED ORAL
Qty: 90 TABLET | Refills: 3 | OUTPATIENT
Start: 2025-02-12

## 2025-02-14 DIAGNOSIS — L20.9 ATOPIC DERMATITIS, UNSPECIFIED TYPE: ICD-10-CM

## 2025-02-14 NOTE — TELEPHONE ENCOUNTER
No care due was identified.  [facility] Embedded Care Due Messages. Reference number: [id number].   2/14/2025 1:11:43 PM CST

## 2025-02-15 RX ORDER — TRIAMCINOLONE ACETONIDE 1 MG/G
CREAM TOPICAL
Qty: 80 G | Refills: 1 | OUTPATIENT
Start: 2025-02-15

## 2025-02-15 NOTE — TELEPHONE ENCOUNTER
No care due was identified.  [facility] Embedded Care Due Messages. Reference number: [id number].   2/15/2025 9:34:39 AM CST

## 2025-02-21 RX ORDER — SERTRALINE HYDROCHLORIDE 100 MG/1
TABLET, FILM COATED ORAL
Qty: 90 TABLET | Refills: 3 | Status: SHIPPED | OUTPATIENT
Start: 2025-02-21

## 2025-03-11 NOTE — PROGRESS NOTES
[facility]   Physical Therapy Treatment Note     Name: Trinity Peres  [facility] Number: [id number]    Therapy Diagnosis:   Encounter Diagnoses   Name Primary?    Gait abnormality Yes    Decreased range of motion of left knee     Decreased strength of lower extremity        Physician: Fabiola Pool, *    Visit Date: 6/29/2023    Physician: Fabiola Pool, *     Physician Orders: PT Eval and Treat  Medical Diagnosis from Referral:   Diagnosis   S83.512D (ICD-10-CM) - Rupture of anterior cruciate ligament of left knee, subsequent encounter   S83.242D (ICD-10-CM) - Acute medial meniscus tear, left, subsequent encounter      Evaluation Date: 6/13/2023  Authorization Period Expiration: 09/12/2023  Plan of Care Expiration: 12/31/2023  Progress Note Due: 7/11/2023  Visit # / Visits authorized: 5/8 (+ eval)  FOTO #1 given at eval     Time In: 0900 am   Time Out: 1100 am  Total Billable Time: 60 minutes - medicaid     DOS: 6/12/23  S/p: L ACLr  PROCEDURE:    Arthroscopic assisted ACL reconstruction using bone patellar tendon bone autograft  Bone grafting left tibia and patella  Post-Op Precautions: TTWB for another week, then may increase to 50/50 at week 4, then 100 from week 5-6), ROM 0-90 until 4 weeks post op, then advance to 120     Precautions: Standard, Weightbearing, and post op ACLr      SUBJECTIVE     Pt reports: her knee feels okay and she has been working on self mobilizing her fat pad and scars.   Response to previous treatment: improved quad activation  Functional change: no functional change    Pain: 3/10  Location: left knee      OBJECTIVE     2 week 4 days s/p     Objective Measures updated at progress report unless specified.     Knee Passive Range of Motion:    Right  Left    Flexion 141 90   Extension Hyper 8 Hyper 8       Treatment       Trinity received the treatments listed below:      THERAPEUTIC EXERCISES to develop strength, endurance, ROM, flexibility,  "posture, and core stabilization for 00 minutes including:     MANUAL THERAPY TECHNIQUES including Joint mobilizations and Soft tissue Mobilization were applied to left knee for 20 minutes.  Fat pad mobilization to improve TKE  Patellar mobilization to improve flex/ext  Seat EOT passive flexion 5 min      NEUROMUSCULAR RE-EDUCATION ACTIVITIES to improve Balance, Coordination, Kinesthetic, Sense, Proprioception, and Posture for 40 minutes.  The following were included:   NMES:  - Quad Set  - Quad Set AA into hyperextension  - SAQ 1/2 foam roll  - Standing EOT SLR 4 min    THERAPEUTIC ACTIVITIES to improve dynamic and functional performance for 00 minutes including.    GAIT TRAINING to improve functional mobility and safety for 00 minutes.       Patient Education and Home Exercises     Home Exercises Provided and Patient Education Provided     Education provided:   - prognosis  - diagnosis  - continued HEP  - s/p precautions: DVT, infection, bandaging    Written Home Exercises Provided: yes. Exercises were reviewed and Trinity was able to demonstrate them prior to the end of the session.  Trinity demonstrated good  understanding of the education provided. See EMR under Patient Instructions for exercises provided during therapy sessions    ASSESSMENT     Mairas pad fat was stiff at the start of session causing anterior "pinching" in TKE. She tolerated manual therapy well with increase in extension ROM. She improved quad activation in SAQ. She is unable to perform a SLR without quad lag, but is able to tolerated regressed SLR at the EOT. Treatment will continue to progress within surgical precautions into improve quad activity and increase ROM.     Trinity Is progressing well towards her goals.   Pt prognosis is Good.     Pt will continue to benefit from skilled outpatient physical therapy to address the deficits listed in the problem list box on initial evaluation, provide pt/family education and to maximize pt's " level of independence in the home and community environment.     Pt's spiritual, cultural and educational needs considered and pt agreeable to plan of care and goals.     Anticipated barriers to physical therapy: none    Goals:  Short Term Goals: 8-10 weeks (Progressing, not met)  1. Pt will be compliant with HEP 50% of prescribed amount.  2. The pt to demo improvement in L knee ROM to equal R knee ROM pain free   3.  The pt to demo good quad set with proper hyperextension moment of the L knee   4. The pt to demo ambualting with elast restricitve AD witout major compensatory pattern L knee for at least 20 feet      Long Term Goals: 6 months  (Progressing, not met)   Pt will be compliant with % of prescribed amount.   Patient will improve their FOTO limitation score to at least 17% as evidence of clinically significant improvements in their function.  The pt to demo pain free and uncompensated walking mechanics x10 min on an indoor treadmill  The pt to demo tolerance to a squat at or bellow parallel with uncompensated mechanics x10   The pt to demo strength of L LE within 10% of R LE as demo'd on the biodex machine   The pt will report full participation in ADLs and IADLs without restrictions related to L knee.    PLAN     Progress per POC    Co-treated with Sepideh Yang, SPT    I certify that I was present in the room directing the student in service delivery and guiding them using my skilled judgment. As the co-signing therapist I have reviewed the students documentation and am responsible for the treatment, assessment, and plan.     Jessica Kevin, PT, DPT       Abnormal HR/Temp in hospital >= 38 C

## 2025-03-19 ENCOUNTER — TELEPHONE (OUTPATIENT)
Dept: PRIMARY CARE CLINIC | Facility: CLINIC | Age: 30
End: 2025-03-19

## 2025-06-03 ENCOUNTER — OFFICE VISIT (OUTPATIENT)
Dept: OBSTETRICS AND GYNECOLOGY | Facility: CLINIC | Age: 30
End: 2025-06-03
Payer: MEDICAID

## 2025-06-03 VITALS — WEIGHT: 212.5 LBS | DIASTOLIC BLOOD PRESSURE: 84 MMHG | BODY MASS INDEX: 35.37 KG/M2 | SYSTOLIC BLOOD PRESSURE: 143 MMHG

## 2025-06-03 DIAGNOSIS — Z72.89 OTHER PROBLEMS RELATED TO LIFESTYLE: ICD-10-CM

## 2025-06-03 DIAGNOSIS — Z11.3 SCREENING EXAMINATION FOR STD (SEXUALLY TRANSMITTED DISEASE): ICD-10-CM

## 2025-06-03 DIAGNOSIS — Z87.42 HISTORY OF ABNORMAL CERVICAL PAP SMEAR: ICD-10-CM

## 2025-06-03 DIAGNOSIS — Z01.419 ENCOUNTER FOR ANNUAL ROUTINE GYNECOLOGICAL EXAMINATION: Primary | ICD-10-CM

## 2025-06-03 DIAGNOSIS — Z12.4 PAP SMEAR FOR CERVICAL CANCER SCREENING: ICD-10-CM

## 2025-06-03 PROCEDURE — 87591 N.GONORRHOEAE DNA AMP PROB: CPT | Performed by: OBSTETRICS & GYNECOLOGY

## 2025-06-03 PROCEDURE — 88142 CYTOPATH C/V THIN LAYER: CPT | Mod: TC | Performed by: OBSTETRICS & GYNECOLOGY

## 2025-06-03 PROCEDURE — 99395 PREV VISIT EST AGE 18-39: CPT | Mod: S$PBB,,, | Performed by: OBSTETRICS & GYNECOLOGY

## 2025-06-03 PROCEDURE — 99213 OFFICE O/P EST LOW 20 MIN: CPT | Mod: PBBFAC,PO | Performed by: OBSTETRICS & GYNECOLOGY

## 2025-06-03 PROCEDURE — 81515 NFCT DS BV&VAGINITIS DNA ALG: CPT | Performed by: OBSTETRICS & GYNECOLOGY

## 2025-06-03 PROCEDURE — 99999 PR PBB SHADOW E&M-EST. PATIENT-LVL III: CPT | Mod: PBBFAC,,, | Performed by: OBSTETRICS & GYNECOLOGY

## 2025-06-03 PROCEDURE — 3008F BODY MASS INDEX DOCD: CPT | Mod: CPTII,,, | Performed by: OBSTETRICS & GYNECOLOGY

## 2025-06-03 PROCEDURE — 1159F MED LIST DOCD IN RCRD: CPT | Mod: CPTII,,, | Performed by: OBSTETRICS & GYNECOLOGY

## 2025-06-03 PROCEDURE — 3079F DIAST BP 80-89 MM HG: CPT | Mod: CPTII,,, | Performed by: OBSTETRICS & GYNECOLOGY

## 2025-06-03 PROCEDURE — 87624 HPV HI-RISK TYP POOLED RSLT: CPT | Performed by: OBSTETRICS & GYNECOLOGY

## 2025-06-03 PROCEDURE — 3077F SYST BP >= 140 MM HG: CPT | Mod: CPTII,,, | Performed by: OBSTETRICS & GYNECOLOGY

## 2025-06-04 ENCOUNTER — PATIENT MESSAGE (OUTPATIENT)
Dept: OBSTETRICS AND GYNECOLOGY | Facility: CLINIC | Age: 30
End: 2025-06-04
Payer: MEDICAID

## 2025-06-04 LAB
BACTERIAL VAGINOSIS DNA (OHS): DETECTED
C TRACH DNA SPEC QL NAA+PROBE: NOT DETECTED
CANDIDA GLABRATA/KRUSEI DNA (OHS): NOT DETECTED
CANDIDA SPECIES DNA (OHS): NOT DETECTED
CTGC SOURCE (OHS) ORD-325: NORMAL
N GONORRHOEA DNA UR QL NAA+PROBE: NOT DETECTED
TRICHOMONAS VAGINALIS DNA (OHS): NOT DETECTED

## 2025-06-04 RX ORDER — METRONIDAZOLE 500 MG/1
500 TABLET ORAL EVERY 12 HOURS
Qty: 14 TABLET | Refills: 0 | Status: SHIPPED | OUTPATIENT
Start: 2025-06-04 | End: 2025-06-11

## 2025-06-06 ENCOUNTER — PATIENT MESSAGE (OUTPATIENT)
Dept: OBSTETRICS AND GYNECOLOGY | Facility: CLINIC | Age: 30
End: 2025-06-06
Payer: MEDICAID

## 2025-06-06 LAB
INSULIN SERPL-ACNC: NORMAL U[IU]/ML
LAB AP BETHESDA CATEGORY: NORMAL
LAB AP CLINICAL FINDINGS: NORMAL
LAB AP CONTRACEPTIVES: NORMAL
LAB AP GYN ADDITIONAL FINDINGS: NORMAL
LAB AP LMP DATE: NORMAL
LAB AP OCHS PAP SPECIMEN ADEQUACY: NORMAL
LAB AP OHS PAP INTERPRETATION: NORMAL
LAB AP PAP DISCLAIMER COMMENTS: NORMAL
LAB AP PAP ESTROGEN REPLACEMENT THERAPY: NORMAL
LAB AP PAP PMP: NORMAL
LAB AP PAP PREVIOUS BX: NORMAL
LAB AP PAP PRIOR TREATMENT: NORMAL
LAB AP PERFORMING LOCATION(S): NORMAL

## 2025-06-10 ENCOUNTER — PATIENT MESSAGE (OUTPATIENT)
Dept: OBSTETRICS AND GYNECOLOGY | Facility: CLINIC | Age: 30
End: 2025-06-10
Payer: MEDICAID